# Patient Record
Sex: FEMALE | Race: WHITE | NOT HISPANIC OR LATINO | Employment: OTHER | URBAN - METROPOLITAN AREA
[De-identification: names, ages, dates, MRNs, and addresses within clinical notes are randomized per-mention and may not be internally consistent; named-entity substitution may affect disease eponyms.]

---

## 2017-07-02 ENCOUNTER — HOSPITAL ENCOUNTER (EMERGENCY)
Facility: HOSPITAL | Age: 57
Discharge: OTHER INSTITUTION | End: 2017-07-03
Attending: EMERGENCY MEDICINE | Admitting: EMERGENCY MEDICINE

## 2017-07-02 DIAGNOSIS — K04.7 DENTAL ABSCESS: Primary | ICD-10-CM

## 2017-07-02 PROCEDURE — 80048 BASIC METABOLIC PNL TOTAL CA: CPT | Performed by: EMERGENCY MEDICINE

## 2017-07-02 PROCEDURE — 99282 EMERGENCY DEPT VISIT SF MDM: CPT | Performed by: EMERGENCY MEDICINE

## 2017-07-02 PROCEDURE — 96361 HYDRATE IV INFUSION ADD-ON: CPT

## 2017-07-02 PROCEDURE — 99284 EMERGENCY DEPT VISIT MOD MDM: CPT

## 2017-07-02 PROCEDURE — 85025 COMPLETE CBC W/AUTO DIFF WBC: CPT | Performed by: EMERGENCY MEDICINE

## 2017-07-02 PROCEDURE — 96365 THER/PROPH/DIAG IV INF INIT: CPT

## 2017-07-03 ENCOUNTER — APPOINTMENT (OUTPATIENT)
Dept: CT IMAGING | Facility: HOSPITAL | Age: 57
End: 2017-07-03
Attending: EMERGENCY MEDICINE

## 2017-07-03 VITALS
OXYGEN SATURATION: 92 % | WEIGHT: 184 LBS | TEMPERATURE: 98.4 F | HEIGHT: 68 IN | BODY MASS INDEX: 27.89 KG/M2 | DIASTOLIC BLOOD PRESSURE: 72 MMHG | SYSTOLIC BLOOD PRESSURE: 111 MMHG | RESPIRATION RATE: 14 BRPM | HEART RATE: 84 BPM

## 2017-07-03 LAB
ANION GAP SERPL CALCULATED.3IONS-SCNC: 15.6 MMOL/L
BASOPHILS # BLD AUTO: 0.05 10*3/MM3 (ref 0–0.2)
BASOPHILS NFR BLD AUTO: 0.4 % (ref 0–2)
BUN BLD-MCNC: 18 MG/DL (ref 6–20)
BUN/CREAT SERPL: 17.5 (ref 7–25)
CALCIUM SPEC-SCNC: 9.5 MG/DL (ref 8.6–10.5)
CHLORIDE SERPL-SCNC: 100 MMOL/L (ref 98–107)
CO2 SERPL-SCNC: 22.4 MMOL/L (ref 22–29)
CREAT BLD-MCNC: 1.03 MG/DL (ref 0.57–1)
DEPRECATED RDW RBC AUTO: 47 FL (ref 37–54)
EOSINOPHIL # BLD AUTO: 1.08 10*3/MM3 (ref 0.1–0.3)
EOSINOPHIL NFR BLD AUTO: 8.1 % (ref 0–4)
ERYTHROCYTE [DISTWIDTH] IN BLOOD BY AUTOMATED COUNT: 13.7 % (ref 11.5–14.5)
GFR SERPL CREATININE-BSD FRML MDRD: 55 ML/MIN/1.73
GLUCOSE BLD-MCNC: 94 MG/DL (ref 65–99)
HCT VFR BLD AUTO: 41.5 % (ref 37–47)
HGB BLD-MCNC: 13.9 G/DL (ref 12–16)
IMM GRANULOCYTES # BLD: 0.03 10*3/MM3 (ref 0–0.03)
IMM GRANULOCYTES NFR BLD: 0.2 % (ref 0–0.5)
LYMPHOCYTES # BLD AUTO: 1.9 10*3/MM3 (ref 0.6–4.8)
LYMPHOCYTES NFR BLD AUTO: 14.2 % (ref 20–45)
MCH RBC QN AUTO: 31 PG (ref 27–31)
MCHC RBC AUTO-ENTMCNC: 33.5 G/DL (ref 31–37)
MCV RBC AUTO: 92.4 FL (ref 81–99)
MONOCYTES # BLD AUTO: 1.36 10*3/MM3 (ref 0–1)
MONOCYTES NFR BLD AUTO: 10.2 % (ref 3–8)
NEUTROPHILS # BLD AUTO: 8.92 10*3/MM3 (ref 1.5–8.3)
NEUTROPHILS NFR BLD AUTO: 66.9 % (ref 45–70)
NRBC BLD MANUAL-RTO: 0 /100 WBC (ref 0–0)
PLATELET # BLD AUTO: 296 10*3/MM3 (ref 140–500)
PMV BLD AUTO: 10.3 FL (ref 7.4–10.4)
POTASSIUM BLD-SCNC: 3.8 MMOL/L (ref 3.5–5.2)
RBC # BLD AUTO: 4.49 10*6/MM3 (ref 4.2–5.4)
SODIUM BLD-SCNC: 138 MMOL/L (ref 136–145)
WBC NRBC COR # BLD: 13.34 10*3/MM3 (ref 4.8–10.8)

## 2017-07-03 PROCEDURE — 70491 CT SOFT TISSUE NECK W/DYE: CPT

## 2017-07-03 PROCEDURE — 70487 CT MAXILLOFACIAL W/DYE: CPT

## 2017-07-03 PROCEDURE — 0 IOPAMIDOL PER 1 ML: Performed by: EMERGENCY MEDICINE

## 2017-07-03 RX ORDER — HYDROCODONE BITARTRATE AND ACETAMINOPHEN 5; 325 MG/1; MG/1
1 TABLET ORAL ONCE
Status: COMPLETED | OUTPATIENT
Start: 2017-07-03 | End: 2017-07-03

## 2017-07-03 RX ORDER — HYDROCODONE BITARTRATE AND ACETAMINOPHEN 5; 325 MG/1; MG/1
TABLET ORAL
Status: COMPLETED
Start: 2017-07-03 | End: 2017-07-03

## 2017-07-03 RX ORDER — CLINDAMYCIN PHOSPHATE 600 MG/50ML
600 INJECTION INTRAVENOUS ONCE
Status: COMPLETED | OUTPATIENT
Start: 2017-07-03 | End: 2017-07-03

## 2017-07-03 RX ADMIN — HYDROCODONE BITARTRATE AND ACETAMINOPHEN 1 TABLET: 5; 325 TABLET ORAL at 02:14

## 2017-07-03 RX ADMIN — CLINDAMYCIN PHOSPHATE 600 MG: 12 INJECTION, SOLUTION INTRAVENOUS at 02:55

## 2017-07-03 RX ADMIN — IOPAMIDOL 100 ML: 755 INJECTION, SOLUTION INTRAVENOUS at 02:48

## 2017-07-03 RX ADMIN — SODIUM CHLORIDE 1000 ML: 9 INJECTION, SOLUTION INTRAVENOUS at 00:59

## 2017-07-03 RX ADMIN — IOPAMIDOL 100 ML: 755 INJECTION, SOLUTION INTRAVENOUS at 02:45

## 2017-07-03 NOTE — ED PROVIDER NOTES
Subjective   History of Present Illness  History of Present Illness    Chief complaint: Swelling on the roof of mouth    Location: Upper left hard palate    Quality/Severity:  Moderate, sharp pain    Timing/Onset/Duration: Gradual onset since Thursday    Modifying Factors: It hurts to touch the area, feels better not to touch it    Associated Symptoms: No headache.  The patient has had subjective fever and chills.  No cough.  She has had throat pain and difficulty swallowing for last 3 days.  The patient has had clear nasal congestion.    Narrative: This 56-year-old white female presents with a swelling on the roof of her mouth.  She has redness on the left side of her cheek.  She complains of difficulty swallowing and throat pain for the last 3 days.  The patient recently traveled to [facility].  She denies any trauma.  The patient is not diabetic.  The patient denies any headache.  She has had subjective fever and chills.  No cough or earache.  The patient has had clear nasal congestion.  No chest pain or shortness of breath.  No abdominal pain.  No diarrhea or burning when she urinates.  No nausea or vomiting.    PCP:Siria      Review of Systems   Constitutional: Positive for chills and fever (subjective).   HENT: Positive for congestion, rhinorrhea and trouble swallowing. Negative for ear pain and sore throat.    Eyes: Negative for pain and discharge.   Respiratory: Negative for cough, chest tightness, shortness of breath and wheezing.    Cardiovascular: Negative for chest pain, palpitations and leg swelling.   Gastrointestinal: Positive for blood in stool. Negative for abdominal pain, diarrhea, nausea and vomiting.   Genitourinary: Negative for difficulty urinating.   Musculoskeletal: Negative for back pain.   Skin: Positive for rash (redness left cheek). Negative for pallor.   Neurological: Negative for weakness and headaches.   Hematological: Negative for adenopathy.   Psychiatric/Behavioral: Negative  for confusion.        Medication List      ASK your doctor about these medications          cefuroxime 500 MG tablet   Commonly known as:  CEFTIN       HYDROcodone-acetaminophen 7.5-325 MG per tablet   Commonly known as:  NORCO       loratadine 10 MG tablet   Commonly known as:  CLARITIN       phenazopyridine 200 MG tablet   Commonly known as:  PYRIDIUM       potassium citrate 10 MEQ (1080 MG) CR tablet   Commonly known as:  UROCIT-K           Past Medical History:   Diagnosis Date   • Arthritis    • GERD (gastroesophageal reflux disease)    • Hiatal hernia    • Kidney calculi    • Kidney stone        Allergies   Allergen Reactions   • Nickel Swelling       Past Surgical History:   Procedure Laterality Date   • BREAST SURGERY     •  SECTION     • CHOLECYSTECTOMY     • CYSTOSCOPY N/A 2016    Procedure: CYSTOSCOPY;  Surgeon: Mikey Aguirre MD;  Location: [facility] OR;  Service:    • HYSTERECTOMY     • KIDNEY STONE SURGERY     • URETEROSCOPY LASER LITHOTRIPSY WITH STENT INSERTION Left 2016    Procedure: lt URETEROSCOPY LASER LITHOTRIPSY WITH stone basket extraction w/STENT INSERTION;  Surgeon: Hasmukh Krueger MD;  Location: [facility] OR;  Service:        Family History   Problem Relation Age of Onset   • Heart disease Mother    • Cancer Father        Social History     Social History   • Marital status:      Spouse name: N/A   • Number of children: N/A   • Years of education: N/A     Social History Main Topics   • Smoking status: Former Smoker     Years: 43.00     Types: Cigarettes     Quit date: 2016   • Smokeless tobacco: Never Used   • Alcohol use No   • Drug use: No   • Sexual activity: Defer     Other Topics Concern   • None     Social History Narrative           Objective   Physical Exam   Constitutional: She is oriented to person, place, and time. She appears well-developed and well-nourished. No distress.   ED Triage Vitals:  Temp: 98.2 °F (36.8 °C) (17 2154)  Heart  Rate: 92 (07/02/17 2154)  Resp: 18 (07/02/17 2154)  BP: 156/81 (07/02/17 2154)  SpO2: 100 % (07/02/17 2154)  Temp src: Oral (07/02/17 2154)  Heart Rate Source: Monitor (07/02/17 2154)  Patient Position: Sitting (07/02/17 2154)  BP Location: Right arm (07/02/17 2154)  FiO2 (%): n/a    The patient's vitals were reviewed by me.  Unless otherwise noted they are within normal limits.  The patient is hypertensive with blood pressure 156/81.     HENT:   Head: Normocephalic and atraumatic.   Right Ear: External ear normal.   Left Ear: External ear normal.   Nose: Nose normal.   Mouth/Throat: Oropharynx is clear and moist.   There is a tender fluctuance noted on the roof of the mouth in the region of the left hard palate.  There is redness noted to the left side of the cheek.  The patient has no trismus.  There is no uvular edema or deviation.  The patient is managing her own airway.  The patient has no malocclusion of the teeth.   Eyes: Conjunctivae and EOM are normal. Pupils are equal, round, and reactive to light. Right eye exhibits no discharge. Left eye exhibits no discharge.   Neck: Normal range of motion. Neck supple. No JVD present. No tracheal deviation present. No thyromegaly present.   Cardiovascular: Normal rate, regular rhythm, normal heart sounds and intact distal pulses.  Exam reveals no gallop and no friction rub.    No murmur heard.  Pulmonary/Chest: Effort normal and breath sounds normal. No stridor. No respiratory distress. She has no wheezes. She has no rales. She exhibits no tenderness.   Abdominal: Soft. Bowel sounds are normal. She exhibits no distension and no mass. There is no tenderness. There is no rebound and no guarding. No hernia.   Musculoskeletal: Normal range of motion. She exhibits no edema or deformity.   Lymphadenopathy:     She has no cervical adenopathy.   Neurological: She is alert and oriented to person, place, and time. No cranial nerve deficit. She exhibits normal muscle tone.  Coordination normal.   Skin: Skin is warm and dry. No rash noted. She is not diaphoretic. There is erythema (left cheek). No pallor.   Psychiatric: Her behavior is normal.   Nursing note and vitals reviewed.      Procedures         ED Course  ED Course   Comment By Time   Values were reviewed by me.  The creatinines 1.03.  The second 13.3.  The laboratory values are otherwise unremarkable. Reynaldo Leon MD 07/03 0152    12:49 AM, 07/03/17:  The risks versus the benefits of receiving IV contrast with the patient's CT were discussed with her.  She has elected to receive IV contrast with the hydration protocol.              MDM  CT Facial Bones With Contrast    (Results Pending)     Labs Reviewed   BASIC METABOLIC PANEL   CBC WITH AUTO DIFFERENTIAL   CBC AND DIFFERENTIAL    Narrative:     The following orders were created for panel order CBC & Differential.  Procedure                               Abnormality         Status                     ---------                               -----------         ------                     CBC Auto Differential[[id number]]                                                          Please view results for these tests on the individual orders.     2:22 AM, 07/03/17:  The CT of the face and neck shows a large periapical lucency associated with the left maxillary first and second incisor and left canine likely representing a periapical abscess, soft tissue abscess in the roof of the mouth measuring 1.3 back 0.9 x 1.5 cm, poor dentition, mucosal thickening left maxillary sinus    2:52 AM, 07/03/17:  I spoke with Dr. Ventura, on call for oral surgery, he wants the patient to go to the emergency department and consult the dental service.    Final diagnoses:   None         ED Medications:  Medications - No data to display    New Medications:     Medication List      ASK your doctor about these medications          cefuroxime 500 MG tablet   Commonly known as:  CEFTIN        HYDROcodone-acetaminophen 7.5-325 MG per tablet   Commonly known as:  NORCO       loratadine 10 MG tablet   Commonly known as:  CLARITIN       phenazopyridine 200 MG tablet   Commonly known as:  PYRIDIUM       potassium citrate 10 MEQ (1080 MG) CR tablet   Commonly known as:  UROCIT-K           Stopped Medications:     Medication List      ASK your doctor about these medications          cefuroxime 500 MG tablet   Commonly known as:  CEFTIN       HYDROcodone-acetaminophen 7.5-325 MG per tablet   Commonly known as:  NORCO       loratadine 10 MG tablet   Commonly known as:  CLARITIN       phenazopyridine 200 MG tablet   Commonly known as:  PYRIDIUM       potassium citrate 10 MEQ (1080 MG) CR tablet   Commonly known as:  UROCIT-K             Final diagnoses:   Dental abscess            Reynaldo Leon MD  07/03/17 5991

## 2018-02-08 ENCOUNTER — HOSPITAL ENCOUNTER (OUTPATIENT)
Dept: GENERAL RADIOLOGY | Facility: HOSPITAL | Age: 58
Discharge: HOME | End: 2018-02-08
Attending: UROLOGY | Admitting: UROLOGY

## 2018-02-08 ENCOUNTER — TRANSCRIBE ORDERS (OUTPATIENT)
Dept: ADMINISTRATIVE | Facility: HOSPITAL | Age: 58
End: 2018-02-08

## 2018-02-08 DIAGNOSIS — N20.0 RENAL CALCULUS: Primary | ICD-10-CM

## 2018-02-08 DIAGNOSIS — N20.0 RENAL CALCULUS: ICD-10-CM

## 2018-02-08 PROCEDURE — 74018 RADEX ABDOMEN 1 VIEW: CPT

## 2019-02-28 RX ORDER — OXYCODONE AND ACETAMINOPHEN 7.5; 325 MG/1; MG/1
1 TABLET ORAL EVERY 6 HOURS PRN
COMMUNITY
End: 2019-03-12

## 2019-02-28 RX ORDER — CIPROFLOXACIN 500 MG/1
500 TABLET, FILM COATED ORAL 2 TIMES DAILY
COMMUNITY
End: 2019-03-12

## 2019-02-28 RX ORDER — ONDANSETRON 4 MG/1
4 TABLET, FILM COATED ORAL EVERY 8 HOURS PRN
COMMUNITY
End: 2019-08-08

## 2019-02-28 RX ORDER — OMEPRAZOLE 20 MG/1
20 CAPSULE, DELAYED RELEASE ORAL DAILY PRN
COMMUNITY

## 2019-02-28 RX ORDER — ATORVASTATIN CALCIUM 10 MG/1
10 TABLET, FILM COATED ORAL NIGHTLY
COMMUNITY

## 2019-03-01 ENCOUNTER — APPOINTMENT (OUTPATIENT)
Dept: GENERAL RADIOLOGY | Facility: HOSPITAL | Age: 59
End: 2019-03-01

## 2019-03-01 ENCOUNTER — HOSPITAL ENCOUNTER (OUTPATIENT)
Facility: HOSPITAL | Age: 59
Setting detail: HOSPITAL OUTPATIENT SURGERY
Discharge: HOME | End: 2019-03-01
Attending: UROLOGY | Admitting: UROLOGY

## 2019-03-01 ENCOUNTER — ANESTHESIA (OUTPATIENT)
Dept: PERIOP | Facility: HOSPITAL | Age: 59
End: 2019-03-01

## 2019-03-01 ENCOUNTER — ANESTHESIA EVENT (OUTPATIENT)
Dept: PERIOP | Facility: HOSPITAL | Age: 59
End: 2019-03-01

## 2019-03-01 VITALS
OXYGEN SATURATION: 96 % | WEIGHT: 191.8 LBS | SYSTOLIC BLOOD PRESSURE: 112 MMHG | RESPIRATION RATE: 18 BRPM | HEART RATE: 66 BPM | HEIGHT: 68 IN | DIASTOLIC BLOOD PRESSURE: 66 MMHG | BODY MASS INDEX: 29.07 KG/M2 | TEMPERATURE: 97.8 F

## 2019-03-01 DIAGNOSIS — N20.1 LEFT URETERAL STONE: Primary | ICD-10-CM

## 2019-03-01 DIAGNOSIS — N20.0 RENAL STONE: ICD-10-CM

## 2019-03-01 PROCEDURE — 25010000002 MIDAZOLAM PER 1 MG: Performed by: ANESTHESIOLOGY

## 2019-03-01 PROCEDURE — 93005 ELECTROCARDIOGRAM TRACING: CPT | Performed by: UROLOGY

## 2019-03-01 PROCEDURE — 25010000002 FENTANYL CITRATE (PF) 100 MCG/2ML SOLUTION: Performed by: NURSE ANESTHETIST, CERTIFIED REGISTERED

## 2019-03-01 PROCEDURE — 93010 ELECTROCARDIOGRAM REPORT: CPT | Performed by: INTERNAL MEDICINE

## 2019-03-01 PROCEDURE — C2617 STENT, NON-COR, TEM W/O DEL: HCPCS | Performed by: UROLOGY

## 2019-03-01 PROCEDURE — 74420 UROGRAPHY RTRGR +-KUB: CPT

## 2019-03-01 PROCEDURE — 0 IOTHALAMATE 60 % SOLUTION: Performed by: UROLOGY

## 2019-03-01 PROCEDURE — 82360 CALCULUS ASSAY QUANT: CPT | Performed by: UROLOGY

## 2019-03-01 PROCEDURE — 25010000002 PROPOFOL 10 MG/ML EMULSION: Performed by: NURSE ANESTHETIST, CERTIFIED REGISTERED

## 2019-03-01 PROCEDURE — 25010000002 ONDANSETRON PER 1 MG: Performed by: NURSE ANESTHETIST, CERTIFIED REGISTERED

## 2019-03-01 PROCEDURE — 25010000002 CEFTRIAXONE PER 250 MG: Performed by: UROLOGY

## 2019-03-01 DEVICE — URETERAL STENT
Type: IMPLANTABLE DEVICE | Site: URETER | Status: FUNCTIONAL
Brand: CONTOUR™

## 2019-03-01 RX ORDER — OXYCODONE AND ACETAMINOPHEN 7.5; 325 MG/1; MG/1
1 TABLET ORAL ONCE AS NEEDED
Status: DISCONTINUED | OUTPATIENT
Start: 2019-03-01 | End: 2019-03-01 | Stop reason: HOSPADM

## 2019-03-01 RX ORDER — CEFTRIAXONE SODIUM 1 G/50ML
1 INJECTION, SOLUTION INTRAVENOUS ONCE
Status: COMPLETED | OUTPATIENT
Start: 2019-03-01 | End: 2019-03-01

## 2019-03-01 RX ORDER — DIPHENHYDRAMINE HCL 25 MG
25 CAPSULE ORAL
Status: DISCONTINUED | OUTPATIENT
Start: 2019-03-01 | End: 2019-03-01 | Stop reason: HOSPADM

## 2019-03-01 RX ORDER — HYDROMORPHONE HYDROCHLORIDE 1 MG/ML
0.5 INJECTION, SOLUTION INTRAMUSCULAR; INTRAVENOUS; SUBCUTANEOUS
Status: DISCONTINUED | OUTPATIENT
Start: 2019-03-01 | End: 2019-03-01 | Stop reason: HOSPADM

## 2019-03-01 RX ORDER — DIPHENHYDRAMINE HYDROCHLORIDE 50 MG/ML
12.5 INJECTION INTRAMUSCULAR; INTRAVENOUS
Status: DISCONTINUED | OUTPATIENT
Start: 2019-03-01 | End: 2019-03-01 | Stop reason: HOSPADM

## 2019-03-01 RX ORDER — ONDANSETRON 4 MG/1
4 TABLET, FILM COATED ORAL ONCE AS NEEDED
Status: DISCONTINUED | OUTPATIENT
Start: 2019-03-01 | End: 2019-03-01 | Stop reason: HOSPADM

## 2019-03-01 RX ORDER — HYDRALAZINE HYDROCHLORIDE 20 MG/ML
5 INJECTION INTRAMUSCULAR; INTRAVENOUS
Status: DISCONTINUED | OUTPATIENT
Start: 2019-03-01 | End: 2019-03-01 | Stop reason: HOSPADM

## 2019-03-01 RX ORDER — PROMETHAZINE HYDROCHLORIDE 25 MG/ML
12.5 INJECTION, SOLUTION INTRAMUSCULAR; INTRAVENOUS ONCE AS NEEDED
Status: DISCONTINUED | OUTPATIENT
Start: 2019-03-01 | End: 2019-03-01 | Stop reason: HOSPADM

## 2019-03-01 RX ORDER — ACETAMINOPHEN 325 MG/1
650 TABLET ORAL ONCE AS NEEDED
Status: DISCONTINUED | OUTPATIENT
Start: 2019-03-01 | End: 2019-03-01 | Stop reason: HOSPADM

## 2019-03-01 RX ORDER — NALOXONE HCL 0.4 MG/ML
0.2 VIAL (ML) INJECTION AS NEEDED
Status: DISCONTINUED | OUTPATIENT
Start: 2019-03-01 | End: 2019-03-01 | Stop reason: HOSPADM

## 2019-03-01 RX ORDER — SODIUM CHLORIDE, SODIUM LACTATE, POTASSIUM CHLORIDE, CALCIUM CHLORIDE 600; 310; 30; 20 MG/100ML; MG/100ML; MG/100ML; MG/100ML
9 INJECTION, SOLUTION INTRAVENOUS CONTINUOUS
Status: DISCONTINUED | OUTPATIENT
Start: 2019-03-01 | End: 2019-03-01 | Stop reason: HOSPADM

## 2019-03-01 RX ORDER — ONDANSETRON 2 MG/ML
4 INJECTION INTRAMUSCULAR; INTRAVENOUS ONCE AS NEEDED
Status: DISCONTINUED | OUTPATIENT
Start: 2019-03-01 | End: 2019-03-01 | Stop reason: HOSPADM

## 2019-03-01 RX ORDER — FENTANYL CITRATE 50 UG/ML
INJECTION, SOLUTION INTRAMUSCULAR; INTRAVENOUS AS NEEDED
Status: DISCONTINUED | OUTPATIENT
Start: 2019-03-01 | End: 2019-03-01 | Stop reason: SURG

## 2019-03-01 RX ORDER — MIDAZOLAM HYDROCHLORIDE 1 MG/ML
2 INJECTION INTRAMUSCULAR; INTRAVENOUS
Status: DISCONTINUED | OUTPATIENT
Start: 2019-03-01 | End: 2019-03-01 | Stop reason: HOSPADM

## 2019-03-01 RX ORDER — MIDAZOLAM HYDROCHLORIDE 1 MG/ML
1 INJECTION INTRAMUSCULAR; INTRAVENOUS
Status: DISCONTINUED | OUTPATIENT
Start: 2019-03-01 | End: 2019-03-01 | Stop reason: HOSPADM

## 2019-03-01 RX ORDER — PROPOFOL 10 MG/ML
VIAL (ML) INTRAVENOUS AS NEEDED
Status: DISCONTINUED | OUTPATIENT
Start: 2019-03-01 | End: 2019-03-01 | Stop reason: SURG

## 2019-03-01 RX ORDER — MAGNESIUM HYDROXIDE 1200 MG/15ML
LIQUID ORAL AS NEEDED
Status: DISCONTINUED | OUTPATIENT
Start: 2019-03-01 | End: 2019-03-01 | Stop reason: HOSPADM

## 2019-03-01 RX ORDER — PROMETHAZINE HYDROCHLORIDE 25 MG/1
25 SUPPOSITORY RECTAL ONCE AS NEEDED
Status: DISCONTINUED | OUTPATIENT
Start: 2019-03-01 | End: 2019-03-01 | Stop reason: HOSPADM

## 2019-03-01 RX ORDER — LIDOCAINE HYDROCHLORIDE 10 MG/ML
0.5 INJECTION, SOLUTION EPIDURAL; INFILTRATION; INTRACAUDAL; PERINEURAL ONCE AS NEEDED
Status: DISCONTINUED | OUTPATIENT
Start: 2019-03-01 | End: 2019-03-01 | Stop reason: HOSPADM

## 2019-03-01 RX ORDER — PHENAZOPYRIDINE HYDROCHLORIDE 200 MG/1
200 TABLET, FILM COATED ORAL
Status: DISCONTINUED | OUTPATIENT
Start: 2019-03-01 | End: 2019-03-01 | Stop reason: HOSPADM

## 2019-03-01 RX ORDER — PROMETHAZINE HYDROCHLORIDE 25 MG/1
25 TABLET ORAL ONCE AS NEEDED
Status: DISCONTINUED | OUTPATIENT
Start: 2019-03-01 | End: 2019-03-01 | Stop reason: HOSPADM

## 2019-03-01 RX ORDER — EPHEDRINE SULFATE 50 MG/ML
5 INJECTION, SOLUTION INTRAVENOUS ONCE AS NEEDED
Status: DISCONTINUED | OUTPATIENT
Start: 2019-03-01 | End: 2019-03-01 | Stop reason: HOSPADM

## 2019-03-01 RX ORDER — PHENAZOPYRIDINE HYDROCHLORIDE 100 MG/1
100 TABLET, FILM COATED ORAL ONCE
Qty: 1 TABLET | Refills: 0 | Status: SHIPPED | OUTPATIENT
Start: 2019-03-01 | End: 2019-03-01

## 2019-03-01 RX ORDER — HYDROCODONE BITARTRATE AND ACETAMINOPHEN 7.5; 325 MG/1; MG/1
1 TABLET ORAL ONCE AS NEEDED
Status: COMPLETED | OUTPATIENT
Start: 2019-03-01 | End: 2019-03-01

## 2019-03-01 RX ORDER — IBUPROFEN 600 MG/1
600 TABLET ORAL EVERY 6 HOURS PRN
Status: DISCONTINUED | OUTPATIENT
Start: 2019-03-01 | End: 2019-03-01 | Stop reason: HOSPADM

## 2019-03-01 RX ORDER — LIDOCAINE HYDROCHLORIDE 20 MG/ML
INJECTION, SOLUTION INFILTRATION; PERINEURAL AS NEEDED
Status: DISCONTINUED | OUTPATIENT
Start: 2019-03-01 | End: 2019-03-01 | Stop reason: SURG

## 2019-03-01 RX ORDER — LABETALOL HYDROCHLORIDE 5 MG/ML
5 INJECTION, SOLUTION INTRAVENOUS
Status: DISCONTINUED | OUTPATIENT
Start: 2019-03-01 | End: 2019-03-01 | Stop reason: HOSPADM

## 2019-03-01 RX ORDER — FENTANYL CITRATE 50 UG/ML
50 INJECTION, SOLUTION INTRAMUSCULAR; INTRAVENOUS
Status: DISCONTINUED | OUTPATIENT
Start: 2019-03-01 | End: 2019-03-01 | Stop reason: HOSPADM

## 2019-03-01 RX ORDER — FAMOTIDINE 10 MG/ML
20 INJECTION, SOLUTION INTRAVENOUS ONCE
Status: COMPLETED | OUTPATIENT
Start: 2019-03-01 | End: 2019-03-01

## 2019-03-01 RX ORDER — FLUMAZENIL 0.1 MG/ML
0.2 INJECTION INTRAVENOUS AS NEEDED
Status: DISCONTINUED | OUTPATIENT
Start: 2019-03-01 | End: 2019-03-01 | Stop reason: HOSPADM

## 2019-03-01 RX ORDER — SODIUM CHLORIDE 0.9 % (FLUSH) 0.9 %
1-10 SYRINGE (ML) INJECTION AS NEEDED
Status: DISCONTINUED | OUTPATIENT
Start: 2019-03-01 | End: 2019-03-01 | Stop reason: HOSPADM

## 2019-03-01 RX ORDER — SODIUM CHLORIDE 9 MG/ML
9 INJECTION, SOLUTION INTRAVENOUS CONTINUOUS
Status: DISCONTINUED | OUTPATIENT
Start: 2019-03-01 | End: 2019-03-01 | Stop reason: HOSPADM

## 2019-03-01 RX ORDER — ONDANSETRON 2 MG/ML
INJECTION INTRAMUSCULAR; INTRAVENOUS AS NEEDED
Status: DISCONTINUED | OUTPATIENT
Start: 2019-03-01 | End: 2019-03-01 | Stop reason: SURG

## 2019-03-01 RX ADMIN — CEFTRIAXONE SODIUM 1 G: 1 INJECTION, SOLUTION INTRAVENOUS at 12:49

## 2019-03-01 RX ADMIN — PROPOFOL 200 MG: 10 INJECTION, EMULSION INTRAVENOUS at 12:54

## 2019-03-01 RX ADMIN — LIDOCAINE HYDROCHLORIDE 60 MG: 20 INJECTION, SOLUTION INFILTRATION; PERINEURAL at 12:54

## 2019-03-01 RX ADMIN — FENTANYL CITRATE 50 MCG: 50 INJECTION INTRAMUSCULAR; INTRAVENOUS at 13:01

## 2019-03-01 RX ADMIN — SODIUM CHLORIDE, POTASSIUM CHLORIDE, SODIUM LACTATE AND CALCIUM CHLORIDE: 600; 310; 30; 20 INJECTION, SOLUTION INTRAVENOUS at 12:48

## 2019-03-01 RX ADMIN — MIDAZOLAM 2 MG: 1 INJECTION INTRAMUSCULAR; INTRAVENOUS at 12:39

## 2019-03-01 RX ADMIN — HYDROCODONE BITARTRATE AND ACETAMINOPHEN 1 TABLET: 7.5; 325 TABLET ORAL at 14:25

## 2019-03-01 RX ADMIN — FENTANYL CITRATE 50 MCG: 50 INJECTION INTRAMUSCULAR; INTRAVENOUS at 12:57

## 2019-03-01 RX ADMIN — FAMOTIDINE 20 MG: 10 INJECTION INTRAVENOUS at 12:39

## 2019-03-01 RX ADMIN — PHENAZOPYRIDINE 200 MG: 200 TABLET ORAL at 14:46

## 2019-03-01 RX ADMIN — ONDANSETRON 4 MG: 2 INJECTION INTRAMUSCULAR; INTRAVENOUS at 13:24

## 2019-03-01 NOTE — ANESTHESIA PROCEDURE NOTES
Airway  Urgency: elective    Airway not difficult    General Information and Staff    Patient location during procedure: OR  CRNA: Lesa Ahn CRNA    Indications and Patient Condition  Indications for airway management: airway protection    Preoxygenated: yes  MILS maintained throughout  Mask difficulty assessment: 1 - vent by mask    Final Airway Details  Final airway type: supraglottic airway      Successful airway: classic  Size 4    Number of attempts at approach: 1    Additional Comments  Pt preoxygenated, sivi, LMA placed with adequate seal and TV

## 2019-03-01 NOTE — BRIEF OP NOTE
URETEROSCOPY LASER LITHOTRIPSY WITH STENT INSERTION  Progress Note    Jennifer Arndt  3/1/2019    Pre-op Diagnosis:   Left ureteral stones        Post-Op Diagnosis Codes:   same    Procedure/CPT® Codes:      Procedure(s):  LEFT URETEROSCOPY LASER LITHOTRIPSY STONE BASKET EXTRACTION STENT    Surgeon(s):  Hasmukh Krueger MD    Anesthesia: General    Staff:   Circulator: Vanessa Schulte RN  Laser Staff: Isael Austin  Scrub Person: Zamzam Velazquez    Estimated Blood Loss: none    Urine Voided: * No values recorded between 3/1/2019 12:48 PM and 3/1/2019  1:34 PM *    Specimens:                ID Type Source Tests Collected by Time   1 : left kidney stones Tissue Kidney, Left STONE ANALYSIS Hasmukh Krueger MD 3/1/2019 1324         Drains:      Findings:2 stones fragmented extracted    Complications:     Hasmukh Krueger MD     Date: 3/1/2019  Time: 1:34 PM

## 2019-03-01 NOTE — OP NOTE
Preoperative diagnosis left ureteral stones hydronephrosis and colic bilateral nephric calculi    Postoperative same stones fragmented and extracted stent placed    Operative procedure cystoscopy left ureteroscopy holmium laser fragmentation stone basket extraction of stone fragments sent for chemical analysis and placement of 6 Nigerien by 26 cm double-J stent without tether under fluoroscopic guidance    Surgeon Evans [facility] general    Procedure note 58-year-old white female plagued with recurrent stone disease presented herself with 2 ureteral stones proximal and distal today's exam however revealed both stones in the distal ureter she is undergo the above-mentioned procedure site was marked antibiotics were given timeout was taken after adequate general anesthesia was placed very carefully modified lithotomy position all pressure points relieved prepped draped sterile fashion genitalia 2% intraurethral Xylocaine jelly administered scope was advanced to the bladder trigone was normal orifices in normal position configuration sensor guidewire was placed up fluoroscopically passed the stones up in the upper collecting system dilation was then a 10 Nigerien ureteroscope was advanced to the stone holmium laser fragmentation of stone with basket extraction of stone fragments reinspection of the ureter up to the ureteropelvic junction revealed a very small stone fragments from the from the procedure but no large fragments or stones were seen.  Scope was then removed cystoscope was placed back over the guidewire a 6 Nigerien by 26 cm stent was placed with good curl in the upper collecting system and bladder no tethered wires are partially filled remainder Xylocaine jelly was instilled per urethra and she was sent to the recovery room in satisfactory condition discussed with findings with the patient spent  over the phone    Disposition once stable discharge today with an outpatient instruction sheet follow  back in my office in 1 week for removal of the stent at that time Pyridium Phenergan and Macrobid suppression she is almost completed with her Cipro that she been placed on through the emergency room I did write her for Norco which she is tolerating the past we will review this with her before she is discharged if any question will be removed.  I will discuss later regarding lithotripsy for the remaining stones because of her constant problems with passing the signs and problems both with a history of urosepsis as well as inability to pass any stones thank you

## 2019-03-01 NOTE — ANESTHESIA POSTPROCEDURE EVALUATION
"Patient: Jennifer Arndt    Procedure Summary     Date:  03/01/19 Room / Location:  [facility] OR 01 / [facility] MAIN OR    Anesthesia Start:  1248 Anesthesia Stop:  1343    Procedure:  LEFT URETEROSCOPY LASER LITHOTRIPSY STONE BASKET EXTRACTION STENT (Left ) Diagnosis:      Surgeon:  Hasmukh Krueger MD Provider:  Tylor Rene MD    Anesthesia Type:  general ASA Status:  2          Anesthesia Type: general  Last vitals  BP   110/72 (03/01/19 1446)   Temp   36.6 °C (97.8 °F) (03/01/19 1341)   Pulse   69 (03/01/19 1446)   Resp   16 (03/01/19 1446)     SpO2   96 % (03/01/19 1446)     Post Anesthesia Care and Evaluation    Patient location during evaluation: bedside  Patient participation: complete - patient participated  Level of consciousness: awake and alert  Pain management: adequate  Airway patency: patent  Anesthetic complications: No anesthetic complications    Cardiovascular status: acceptable  Respiratory status: acceptable  Hydration status: acceptable    Comments: /72   Pulse 69   Temp 36.6 °C (97.8 °F) (Oral)   Resp 16   Ht 172.7 cm (68\")   Wt 87 kg (191 lb 12.8 oz)   SpO2 96%   BMI 29.16 kg/m²       "

## 2019-03-01 NOTE — ANESTHESIA PREPROCEDURE EVALUATION
Anesthesia Evaluation     Patient summary reviewed   history of anesthetic complications: prolonged sedation  NPO Solid Status: > 8 hours  NPO Liquid Status: > 2 hours           Airway   Mallampati: III  TM distance: >3 FB  Neck ROM: full  Comment: Easy previous LMA #4 insertion  Dental      Pulmonary     breath sounds clear to auscultation  (+) a smoker Former, COPD mild,   (-) shortness of breath  Cardiovascular   Exercise tolerance: good (4-7 METS)    Rhythm: regular  Rate: normal    (+) hyperlipidemia,   (-) angina, [place]LLADO      Neuro/Psych  GI/Hepatic/Renal/Endo    (+)  GERD,      Musculoskeletal     Abdominal    Substance History      OB/GYN          Other   (+) arthritis                     Anesthesia Plan    ASA 2     general     intravenous induction   Anesthetic plan, all risks, benefits, and alternatives have been provided, discussed and informed consent has been obtained with: patient.

## 2019-03-01 NOTE — DISCHARGE INSTRUCTIONS
**Refer to Dr. Krueger's post-op discharge instruction sheet.**    **Last Norco pain pill given at 2:25 pm.**          Outpatient Surgery Guidelines, Adult  Outpatient procedures are those for which the person having the procedure is allowed to go home the same day as the procedure. Various procedures are done on an outpatient basis. You should follow some general guidelines if you will be having an outpatient procedure.  AFTER THE  PROCEDURE  After surgery, you will be taken to a recovery area, where your progress will be monitored. If there are no complications, you will be allowed to go home when you are awake, stable, and taking fluids well. You may have numbness around the surgical site. Healing will take some time. You will have tenderness at the surgical site and may have some swelling and bruising. You may also have some nausea.  HOME CARE INSTRUCTIONS  · Do not drive for 24 hours, or as directed by your health care provider. Do not drive while taking prescription pain medicines.  · Do not drink alcohol for 24 hours.  · Do not make important decisions or sign legal documents for 24 hours.  · Plan on having a responsible adult stay with your for 24 hours following your procedure.  · You may resume a normal diet and activities as directed.  · Do not lift anything heavier than 10 pounds (4.5 kg) or play contact sports until your health care provider says it is okay.  · Only take over-the-counter or prescription medicines as directed by your health care provider.  · Follow up with your health care provider as directed.  SEEK MEDICAL CARE IF:  · You have increased bleeding (more than a small spot) from the surgical site.  · You have redness, swelling, or increasing pain in the wound.  · You see pus coming from the wound.  · You have a fever > 101.  · You notice a bad smell coming from the wound or dressing.  · You feel lightheaded or faint.  · You develop a rash.  · You have trouble breathing.  · You develop  allergies.  MAKE SURE YOU:  · Understand these instructions.  · Will watch your condition.  · Will get help right away if you are not doing well or get worse.

## 2019-03-06 LAB
CA PHOS CRY STONE QL IR: 95 %
COLOR STONE: NORMAL
COM CRY STONE QL IR: 2 %
COMPN STONE: NORMAL
Lab: NORMAL
Lab: NORMAL
NIDUS STONE QL: NORMAL
PATH REPORT.COMMENTS IMP SPEC: NORMAL
SIZE STONE: NORMAL MM
TRI-PHOS MFR STONE: 3 %
WT STONE: 22.2 MG

## 2019-03-12 ENCOUNTER — APPOINTMENT (OUTPATIENT)
Dept: GENERAL RADIOLOGY | Facility: HOSPITAL | Age: 59
End: 2019-03-12

## 2019-03-12 ENCOUNTER — HOSPITAL ENCOUNTER (EMERGENCY)
Facility: HOSPITAL | Age: 59
Discharge: HOME | End: 2019-03-13
Attending: EMERGENCY MEDICINE | Admitting: EMERGENCY MEDICINE

## 2019-03-12 DIAGNOSIS — N20.1 LEFT URETERAL CALCULUS: Primary | ICD-10-CM

## 2019-03-12 LAB
BACTERIA UR QL AUTO: ABNORMAL /HPF
BILIRUB UR QL STRIP: NEGATIVE
CLARITY UR: CLEAR
COLOR UR: YELLOW
GLUCOSE UR STRIP-MCNC: NEGATIVE MG/DL
HGB UR QL STRIP.AUTO: ABNORMAL
HYALINE CASTS UR QL AUTO: ABNORMAL /LPF
KETONES UR QL STRIP: NEGATIVE
LEUKOCYTE ESTERASE UR QL STRIP.AUTO: NEGATIVE
NITRITE UR QL STRIP: NEGATIVE
PH UR STRIP.AUTO: 7 [PH] (ref 4.5–8)
PROT UR QL STRIP: NEGATIVE
RBC # UR: ABNORMAL /HPF
REF LAB TEST METHOD: ABNORMAL
SP GR UR STRIP: 1.01 (ref 1–1.03)
SQUAMOUS #/AREA URNS HPF: ABNORMAL /HPF
UROBILINOGEN UR QL STRIP: ABNORMAL
WBC UR QL AUTO: ABNORMAL /HPF

## 2019-03-12 PROCEDURE — 25010000002 HYDROMORPHONE PER 4 MG: Performed by: EMERGENCY MEDICINE

## 2019-03-12 PROCEDURE — 25010000002 ONDANSETRON PER 1 MG: Performed by: EMERGENCY MEDICINE

## 2019-03-12 PROCEDURE — 99284 EMERGENCY DEPT VISIT MOD MDM: CPT

## 2019-03-12 PROCEDURE — 74018 RADEX ABDOMEN 1 VIEW: CPT

## 2019-03-12 PROCEDURE — 25010000002 KETOROLAC TROMETHAMINE PER 15 MG: Performed by: EMERGENCY MEDICINE

## 2019-03-12 PROCEDURE — 99284 EMERGENCY DEPT VISIT MOD MDM: CPT | Performed by: EMERGENCY MEDICINE

## 2019-03-12 PROCEDURE — 81001 URINALYSIS AUTO W/SCOPE: CPT | Performed by: EMERGENCY MEDICINE

## 2019-03-12 PROCEDURE — 96375 TX/PRO/DX INJ NEW DRUG ADDON: CPT

## 2019-03-12 PROCEDURE — 96374 THER/PROPH/DIAG INJ IV PUSH: CPT

## 2019-03-12 RX ORDER — KETOROLAC TROMETHAMINE 30 MG/ML
15 INJECTION, SOLUTION INTRAMUSCULAR; INTRAVENOUS ONCE
Status: COMPLETED | OUTPATIENT
Start: 2019-03-12 | End: 2019-03-12

## 2019-03-12 RX ORDER — HYDROMORPHONE HCL 110MG/55ML
0.5 PATIENT CONTROLLED ANALGESIA SYRINGE INTRAVENOUS ONCE
Status: COMPLETED | OUTPATIENT
Start: 2019-03-12 | End: 2019-03-12

## 2019-03-12 RX ORDER — SODIUM CHLORIDE 0.9 % (FLUSH) 0.9 %
10 SYRINGE (ML) INJECTION AS NEEDED
Status: DISCONTINUED | OUTPATIENT
Start: 2019-03-12 | End: 2019-03-13 | Stop reason: HOSPADM

## 2019-03-12 RX ORDER — ONDANSETRON 2 MG/ML
4 INJECTION INTRAMUSCULAR; INTRAVENOUS ONCE
Status: COMPLETED | OUTPATIENT
Start: 2019-03-12 | End: 2019-03-12

## 2019-03-12 RX ADMIN — HYDROMORPHONE HYDROCHLORIDE 0.5 MG: 2 INJECTION, SOLUTION INTRAMUSCULAR; INTRAVENOUS; SUBCUTANEOUS at 22:56

## 2019-03-12 RX ADMIN — ONDANSETRON 4 MG: 2 INJECTION, SOLUTION INTRAMUSCULAR; INTRAVENOUS at 23:02

## 2019-03-12 RX ADMIN — KETOROLAC TROMETHAMINE 15 MG: 30 INJECTION INTRAMUSCULAR; INTRAVENOUS at 23:03

## 2019-03-13 VITALS
WEIGHT: 189 LBS | RESPIRATION RATE: 16 BRPM | BODY MASS INDEX: 28.64 KG/M2 | DIASTOLIC BLOOD PRESSURE: 65 MMHG | HEART RATE: 77 BPM | TEMPERATURE: 100 F | SYSTOLIC BLOOD PRESSURE: 105 MMHG | HEIGHT: 68 IN | OXYGEN SATURATION: 94 %

## 2019-03-13 RX ORDER — OXYCODONE AND ACETAMINOPHEN 7.5; 325 MG/1; MG/1
1 TABLET ORAL EVERY 4 HOURS PRN
Qty: 15 TABLET | Refills: 0 | Status: SHIPPED | OUTPATIENT
Start: 2019-03-13 | End: 2019-08-08

## 2019-03-13 RX ORDER — PROMETHAZINE HYDROCHLORIDE 25 MG/1
25 TABLET ORAL EVERY 6 HOURS PRN
Qty: 12 TABLET | Refills: 0 | Status: SHIPPED | OUTPATIENT
Start: 2019-03-13 | End: 2019-08-08

## 2019-03-13 NOTE — ED PROVIDER NOTES
Subjective   History of Present Illness  History of Present Illness    Chief complaint: Flank pain    Location: Lower left flank    Quality/Severity: Moderate    Timing/Duration: Patient had laser lithotripsy on March 1 with subsequent stent placement.  Patient has been having pain every since then.  Stent was removed on March 6 and pain became much worse last evening    Modifying Factors: None    Associated Symptoms: Nausea and vomiting    Narrative: The patient is a 58-year-old white female with a long history of kidney stones who presents as noted above.  The patient has had multiple urologic procedures due to her stones.  Last left ureteral stent removed on March 6 and some pain after that.  Pain much worse last evening associated with nausea and vomiting.    Review of Systems   Constitutional: Negative for activity change, appetite change, fatigue and fever.   HENT: Negative for congestion.    Respiratory: Negative for cough, shortness of breath and wheezing.    Cardiovascular: Negative for chest pain, palpitations and leg swelling.   Gastrointestinal: Positive for nausea and vomiting (Twice today). Negative for abdominal pain and diarrhea.   Endocrine: Negative for polydipsia.   Genitourinary: Positive for flank pain. Negative for difficulty urinating, dysuria, frequency, hematuria and urgency.   Musculoskeletal: Negative for back pain.   Skin: Negative for rash.   Neurological: Negative for dizziness, weakness and headaches.   Psychiatric/Behavioral: Negative for confusion.   All other systems reviewed and are negative.      Past Medical History:   Diagnosis Date   • Anesthesia     PT STATES SENSITIVE TO AND B/P DROPS   • Arthritis    • Bruises easily    • Emphysema lung (CMS/HCC)     WAS TOLD SHE HAD THIS PER CT SCAN MAY 2018.... NO INHALERS   • GERD (gastroesophageal reflux disease)    • GERD (gastroesophageal reflux disease)    • Hiatal hernia    • Hiatal hernia    • Hyperlipidemia    • Kidney calculi    •  "Kidney stone    • Recurrent UTI    • Right knee pain     \"BONE ON BONE\"       Allergies   Allergen Reactions   • Morphine Swelling     FELT LIKE THROAT SWELLED, SOME SWALLOWING TROUBLE   • Nickel Swelling       Past Surgical History:   Procedure Laterality Date   • BREAST SURGERY     •  SECTION     • CHOLECYSTECTOMY     • CYSTOSCOPY N/A 2016    Procedure: CYSTOSCOPY;  Surgeon: Mikey Aguirre MD;  Location: [facility];  Service:    • HYSTERECTOMY     • KIDNEY STONE SURGERY     • URETEROSCOPY LASER LITHOTRIPSY WITH STENT INSERTION Left 2016    Procedure: lt URETEROSCOPY LASER LITHOTRIPSY WITH stone basket extraction w/STENT INSERTION;  Surgeon: Hasmukh Krueger MD;  Location: [facility] OR;  Service:    • URETEROSCOPY LASER LITHOTRIPSY WITH STENT INSERTION Left 3/1/2019    Procedure: LEFT URETEROSCOPY LASER LITHOTRIPSY STONE BASKET EXTRACTION STENT;  Surgeon: Hasmukh Krueger MD;  Location: [facility];  Service: Urology       Family History   Problem Relation Age of Onset   • Heart disease Mother    • Cancer Father    • Malig Hyperthermia Neg Hx        Social History     Socioeconomic History   • Marital status:      Spouse name: Not on file   • Number of children: Not on file   • Years of education: Not on file   • Highest education level: Not on file   Tobacco Use   • Smoking status: Former Smoker     Years: 43.00     Types: Cigarettes     Last attempt to quit: 2016     Years since quittin.5   • Smokeless tobacco: Never Used   • Tobacco comment: QUIT 2018   Substance and Sexual Activity   • Alcohol use: No   • Drug use: No   • Sexual activity: Defer     Partners: Female           Objective   Physical Exam   Constitutional: She is oriented to person, place, and time. She appears well-developed and well-nourished.   HENT:   Head: Normocephalic and atraumatic.   Eyes: Conjunctivae and EOM are normal.   Neck: Normal range of motion. Neck supple.   Cardiovascular: " Normal rate, regular rhythm and normal heart sounds.   No murmur heard.  Pulmonary/Chest: Effort normal and breath sounds normal. No respiratory distress. She has no wheezes. She has no rales.   Abdominal: Soft. Bowel sounds are normal. She exhibits no distension. There is no tenderness.   Musculoskeletal: Normal range of motion. She exhibits no edema or tenderness.   Neurological: She is alert and oriented to person, place, and time.   Skin: Skin is warm and dry. No rash noted.   Psychiatric: She has a normal mood and affect. Her behavior is normal.   Nursing note and vitals reviewed.      Procedures           ED Course  ED Course as of Mar 13 0007   Wed Mar 13, 2019   0006 Patient was informed of stone visualized on the KUB.  Patient advised to contact Dr. Krueger in the morning.  Warnings discussed.  [ML]      ED Course User Index  [ML] Isael Morgan MD                  MDM  Number of Diagnoses or Management Options  Left ureteral calculus: new and requires workup     Amount and/or Complexity of Data Reviewed  Clinical lab tests: ordered and reviewed  Tests in the radiology section of CPT®: ordered and reviewed  Review and summarize past medical records: yes  Independent visualization of images, tracings, or specimens: yes    Risk of Complications, Morbidity, and/or Mortality  Presenting problems: high  Diagnostic procedures: moderate  Management options: high    Patient Progress  Patient progress: improved  My differential diagnosis for abdominal pain includes but is not limited to:  Gastritis, gastroenteritis, peptic ulcer disease, GERD, esophageal perforation, acute appendicitis, mesenteric adenitis, Meckel’s diverticulum, epiploic appendagitis, diverticulitis, colon cancer, ulcerative colitis, Crohn’s disease, intussusception, small bowel obstruction, adhesions, ischemic bowel, perforated viscus, ileus, obstipation, biliary colic, cholecystitis, cholelithiasis, Qamar-Jose Gregg, hepatitis,  pancreatitis, common bile duct obstruction, cholangitis, bile leak, splenic trauma, splenic rupture, splenic infarction, splenic abscess, abdominal abscess, ascites, spontaneous bacterial peritonitis, hernia, UTI, cystitis, prostatitis, ureterolithiasis, urinary obstruction, ovarian cyst, torsion, pregnancy, ectopic pregnancy, PID, pelvic abscess, mittelschmerz, endometriosis, AAA, myocardial infarction, pneumonia, cancer, porphyria, DKA, medications, sickle cell, viral syndrome, zoster    Labs this visit  Lab Results (last 24 hours)     Procedure Component Value Units Date/Time    Urinalysis With Microscopic If Indicated (No Culture) - Urine, Clean Catch [[id number]]  (Abnormal) Collected:  03/12/19 2110    Specimen:  Urine, Clean Catch Updated:  03/12/19 2122     Color, UA Yellow     Appearance, UA Clear     pH, UA 7.0     Specific Gravity, UA 1.010     Glucose, UA Negative     Ketones, UA Negative     Bilirubin, UA Negative     Blood, UA Small (1+)     Protein, UA Negative     Leuk Esterase, UA Negative     Nitrite, UA Negative     Urobilinogen, UA 0.2 E.U./dL    Urinalysis, Microscopic Only - Urine, Clean Catch [[id number]]  (Abnormal) Collected:  03/12/19 2110    Specimen:  Urine, Clean Catch Updated:  03/12/19 2152     RBC, UA 6-12 /HPF      WBC, UA 0-2 /HPF      Bacteria, UA Trace /HPF      Squamous Epithelial Cells, UA 0-2 /HPF      Hyaline Casts, UA None Seen /LPF      Methodology Manual Light Microscopy        Prescribed on discharge             Medication List      New Prescriptions    oxyCODONE-acetaminophen 7.5-325 MG per tablet  Commonly known as:  PERCOCET  Take 1 tablet by mouth Every 4 (Four) Hours As Needed for Severe Pain .     promethazine 25 MG tablet  Commonly known as:  PHENERGAN  Take 1 tablet by mouth Every 6 (Six) Hours As Needed for Nausea or   Vomiting for up to 12 doses.          All lab results, imaging results and other tests were reviewed by Isael Morgan MD and unless otherwise  specified were found to be unremarkable.        Final diagnoses:   Left ureteral calculus            Isael Morgan MD  03/13/19 0008

## 2019-03-15 ENCOUNTER — APPOINTMENT (OUTPATIENT)
Dept: GENERAL RADIOLOGY | Facility: HOSPITAL | Age: 59
End: 2019-03-15

## 2019-03-15 ENCOUNTER — HOSPITAL ENCOUNTER (OUTPATIENT)
Facility: HOSPITAL | Age: 59
Setting detail: HOSPITAL OUTPATIENT SURGERY
Discharge: HOME | End: 2019-03-15
Attending: UROLOGY | Admitting: UROLOGY

## 2019-03-15 ENCOUNTER — ANESTHESIA (OUTPATIENT)
Dept: PERIOP | Facility: HOSPITAL | Age: 59
End: 2019-03-15

## 2019-03-15 ENCOUNTER — ANESTHESIA EVENT (OUTPATIENT)
Dept: PERIOP | Facility: HOSPITAL | Age: 59
End: 2019-03-15

## 2019-03-15 VITALS
HEIGHT: 68 IN | WEIGHT: 188.93 LBS | TEMPERATURE: 98.5 F | RESPIRATION RATE: 16 BRPM | HEART RATE: 80 BPM | DIASTOLIC BLOOD PRESSURE: 80 MMHG | BODY MASS INDEX: 28.63 KG/M2 | OXYGEN SATURATION: 98 % | SYSTOLIC BLOOD PRESSURE: 123 MMHG

## 2019-03-15 DIAGNOSIS — N20.0 STONE, KIDNEY: ICD-10-CM

## 2019-03-15 PROBLEM — N20.1 LEFT URETERAL STONE: Status: ACTIVE | Noted: 2019-03-15

## 2019-03-15 PROCEDURE — 25010000002 PROPOFOL 10 MG/ML EMULSION: Performed by: ANESTHESIOLOGY

## 2019-03-15 PROCEDURE — C2617 STENT, NON-COR, TEM W/O DEL: HCPCS | Performed by: UROLOGY

## 2019-03-15 PROCEDURE — 25010000002 FENTANYL CITRATE (PF) 100 MCG/2ML SOLUTION: Performed by: ANESTHESIOLOGY

## 2019-03-15 PROCEDURE — 25010000002 MIDAZOLAM PER 1 MG: Performed by: ANESTHESIOLOGY

## 2019-03-15 PROCEDURE — 25010000003 CEFAZOLIN 1-4 GM/50ML-% SOLUTION: Performed by: UROLOGY

## 2019-03-15 PROCEDURE — 82360 CALCULUS ASSAY QUANT: CPT | Performed by: UROLOGY

## 2019-03-15 PROCEDURE — 25010000002 PHENYLEPHRINE PER 1 ML: Performed by: ANESTHESIOLOGY

## 2019-03-15 PROCEDURE — 74018 RADEX ABDOMEN 1 VIEW: CPT

## 2019-03-15 PROCEDURE — 25010000002 ONDANSETRON PER 1 MG: Performed by: ANESTHESIOLOGY

## 2019-03-15 PROCEDURE — 76000 FLUOROSCOPY <1 HR PHYS/QHP: CPT

## 2019-03-15 DEVICE — URETERAL STENT
Type: IMPLANTABLE DEVICE | Status: FUNCTIONAL
Brand: CONTOUR™

## 2019-03-15 RX ORDER — LIDOCAINE HYDROCHLORIDE 20 MG/ML
INJECTION, SOLUTION INFILTRATION; PERINEURAL AS NEEDED
Status: DISCONTINUED | OUTPATIENT
Start: 2019-03-15 | End: 2019-03-15 | Stop reason: SURG

## 2019-03-15 RX ORDER — ONDANSETRON 2 MG/ML
INJECTION INTRAMUSCULAR; INTRAVENOUS AS NEEDED
Status: DISCONTINUED | OUTPATIENT
Start: 2019-03-15 | End: 2019-03-15 | Stop reason: SURG

## 2019-03-15 RX ORDER — FENTANYL CITRATE 50 UG/ML
INJECTION, SOLUTION INTRAMUSCULAR; INTRAVENOUS AS NEEDED
Status: DISCONTINUED | OUTPATIENT
Start: 2019-03-15 | End: 2019-03-15 | Stop reason: SURG

## 2019-03-15 RX ORDER — LIDOCAINE HYDROCHLORIDE 10 MG/ML
0.5 INJECTION, SOLUTION EPIDURAL; INFILTRATION; INTRACAUDAL; PERINEURAL ONCE AS NEEDED
Status: DISCONTINUED | OUTPATIENT
Start: 2019-03-15 | End: 2019-03-15 | Stop reason: HOSPADM

## 2019-03-15 RX ORDER — PROMETHAZINE HYDROCHLORIDE 25 MG/1
25 TABLET ORAL ONCE AS NEEDED
Status: DISCONTINUED | OUTPATIENT
Start: 2019-03-15 | End: 2019-03-15 | Stop reason: HOSPADM

## 2019-03-15 RX ORDER — MIDAZOLAM HYDROCHLORIDE 1 MG/ML
2 INJECTION INTRAMUSCULAR; INTRAVENOUS
Status: DISCONTINUED | OUTPATIENT
Start: 2019-03-15 | End: 2019-03-15 | Stop reason: HOSPADM

## 2019-03-15 RX ORDER — MAGNESIUM HYDROXIDE 1200 MG/15ML
LIQUID ORAL AS NEEDED
Status: DISCONTINUED | OUTPATIENT
Start: 2019-03-15 | End: 2019-03-15 | Stop reason: HOSPADM

## 2019-03-15 RX ORDER — OXYCODONE AND ACETAMINOPHEN 7.5; 325 MG/1; MG/1
1 TABLET ORAL EVERY 4 HOURS PRN
Status: DISCONTINUED | OUTPATIENT
Start: 2019-03-15 | End: 2019-03-15 | Stop reason: HOSPADM

## 2019-03-15 RX ORDER — PROMETHAZINE HYDROCHLORIDE 25 MG/1
25 SUPPOSITORY RECTAL ONCE AS NEEDED
Status: DISCONTINUED | OUTPATIENT
Start: 2019-03-15 | End: 2019-03-15 | Stop reason: HOSPADM

## 2019-03-15 RX ORDER — FENTANYL CITRATE 50 UG/ML
50 INJECTION, SOLUTION INTRAMUSCULAR; INTRAVENOUS
Status: DISCONTINUED | OUTPATIENT
Start: 2019-03-15 | End: 2019-03-15 | Stop reason: HOSPADM

## 2019-03-15 RX ORDER — ONDANSETRON 2 MG/ML
4 INJECTION INTRAMUSCULAR; INTRAVENOUS ONCE AS NEEDED
Status: DISCONTINUED | OUTPATIENT
Start: 2019-03-15 | End: 2019-03-15 | Stop reason: HOSPADM

## 2019-03-15 RX ORDER — MIDAZOLAM HYDROCHLORIDE 1 MG/ML
1 INJECTION INTRAMUSCULAR; INTRAVENOUS
Status: DISCONTINUED | OUTPATIENT
Start: 2019-03-15 | End: 2019-03-15 | Stop reason: HOSPADM

## 2019-03-15 RX ORDER — EPHEDRINE SULFATE 50 MG/ML
5 INJECTION, SOLUTION INTRAVENOUS ONCE AS NEEDED
Status: DISCONTINUED | OUTPATIENT
Start: 2019-03-15 | End: 2019-03-15 | Stop reason: HOSPADM

## 2019-03-15 RX ORDER — HYDROCODONE BITARTRATE AND ACETAMINOPHEN 7.5; 325 MG/1; MG/1
1 TABLET ORAL ONCE AS NEEDED
Status: DISCONTINUED | OUTPATIENT
Start: 2019-03-15 | End: 2019-03-15 | Stop reason: HOSPADM

## 2019-03-15 RX ORDER — FAMOTIDINE 10 MG/ML
20 INJECTION, SOLUTION INTRAVENOUS ONCE
Status: COMPLETED | OUTPATIENT
Start: 2019-03-15 | End: 2019-03-15

## 2019-03-15 RX ORDER — PROPOFOL 10 MG/ML
VIAL (ML) INTRAVENOUS AS NEEDED
Status: DISCONTINUED | OUTPATIENT
Start: 2019-03-15 | End: 2019-03-15 | Stop reason: SURG

## 2019-03-15 RX ORDER — HYDROCODONE BITARTRATE AND ACETAMINOPHEN 7.5; 325 MG/1; MG/1
1 TABLET ORAL ONCE AS NEEDED
Status: COMPLETED | OUTPATIENT
Start: 2019-03-15 | End: 2019-03-15

## 2019-03-15 RX ORDER — HYDROMORPHONE HYDROCHLORIDE 1 MG/ML
0.5 INJECTION, SOLUTION INTRAMUSCULAR; INTRAVENOUS; SUBCUTANEOUS
Status: DISCONTINUED | OUTPATIENT
Start: 2019-03-15 | End: 2019-03-15 | Stop reason: HOSPADM

## 2019-03-15 RX ORDER — ONDANSETRON 4 MG/1
4 TABLET, FILM COATED ORAL ONCE AS NEEDED
Status: DISCONTINUED | OUTPATIENT
Start: 2019-03-15 | End: 2019-03-15 | Stop reason: HOSPADM

## 2019-03-15 RX ORDER — PROMETHAZINE HYDROCHLORIDE 25 MG/ML
6.25 INJECTION, SOLUTION INTRAMUSCULAR; INTRAVENOUS ONCE AS NEEDED
Status: DISCONTINUED | OUTPATIENT
Start: 2019-03-15 | End: 2019-03-15 | Stop reason: HOSPADM

## 2019-03-15 RX ORDER — CEFAZOLIN SODIUM 1 G/50ML
1 INJECTION, SOLUTION INTRAVENOUS ONCE
Status: COMPLETED | OUTPATIENT
Start: 2019-03-15 | End: 2019-03-15

## 2019-03-15 RX ORDER — SODIUM CHLORIDE, SODIUM LACTATE, POTASSIUM CHLORIDE, CALCIUM CHLORIDE 600; 310; 30; 20 MG/100ML; MG/100ML; MG/100ML; MG/100ML
9 INJECTION, SOLUTION INTRAVENOUS CONTINUOUS
Status: DISCONTINUED | OUTPATIENT
Start: 2019-03-15 | End: 2019-03-15 | Stop reason: HOSPADM

## 2019-03-15 RX ORDER — SODIUM CHLORIDE 0.9 % (FLUSH) 0.9 %
1-10 SYRINGE (ML) INJECTION AS NEEDED
Status: DISCONTINUED | OUTPATIENT
Start: 2019-03-15 | End: 2019-03-15 | Stop reason: HOSPADM

## 2019-03-15 RX ORDER — FLUMAZENIL 0.1 MG/ML
0.2 INJECTION INTRAVENOUS AS NEEDED
Status: DISCONTINUED | OUTPATIENT
Start: 2019-03-15 | End: 2019-03-15 | Stop reason: HOSPADM

## 2019-03-15 RX ADMIN — FAMOTIDINE 20 MG: 10 INJECTION INTRAVENOUS at 16:57

## 2019-03-15 RX ADMIN — CEFAZOLIN SODIUM 1 G: 1 INJECTION, SOLUTION INTRAVENOUS at 17:46

## 2019-03-15 RX ADMIN — MIDAZOLAM 2 MG: 1 INJECTION INTRAMUSCULAR; INTRAVENOUS at 17:28

## 2019-03-15 RX ADMIN — PROPOFOL 200 MG: 10 INJECTION, EMULSION INTRAVENOUS at 17:50

## 2019-03-15 RX ADMIN — PHENYLEPHRINE HYDROCHLORIDE 100 MCG: 10 INJECTION INTRAVENOUS at 18:03

## 2019-03-15 RX ADMIN — FENTANYL CITRATE 50 MCG: 50 INJECTION INTRAMUSCULAR; INTRAVENOUS at 17:48

## 2019-03-15 RX ADMIN — ONDANSETRON 4 MG: 2 INJECTION INTRAMUSCULAR; INTRAVENOUS at 18:15

## 2019-03-15 RX ADMIN — PROPOFOL 100 MG: 10 INJECTION, EMULSION INTRAVENOUS at 17:51

## 2019-03-15 RX ADMIN — SODIUM CHLORIDE, POTASSIUM CHLORIDE, SODIUM LACTATE AND CALCIUM CHLORIDE: 600; 310; 30; 20 INJECTION, SOLUTION INTRAVENOUS at 17:45

## 2019-03-15 RX ADMIN — PHENYLEPHRINE HYDROCHLORIDE 100 MCG: 10 INJECTION INTRAVENOUS at 18:07

## 2019-03-15 RX ADMIN — FENTANYL CITRATE 50 MCG: 50 INJECTION INTRAMUSCULAR; INTRAVENOUS at 17:57

## 2019-03-15 RX ADMIN — LIDOCAINE HYDROCHLORIDE 60 MG: 20 INJECTION, SOLUTION INFILTRATION; PERINEURAL at 17:48

## 2019-03-15 RX ADMIN — PROPOFOL 50 MG: 10 INJECTION, EMULSION INTRAVENOUS at 18:06

## 2019-03-15 RX ADMIN — SODIUM CHLORIDE, POTASSIUM CHLORIDE, SODIUM LACTATE AND CALCIUM CHLORIDE 9 ML/HR: 600; 310; 30; 20 INJECTION, SOLUTION INTRAVENOUS at 16:57

## 2019-03-15 RX ADMIN — FENTANYL CITRATE 50 MCG: 50 INJECTION, SOLUTION INTRAMUSCULAR; INTRAVENOUS at 16:57

## 2019-03-15 RX ADMIN — FENTANYL CITRATE 50 MCG: 50 INJECTION, SOLUTION INTRAMUSCULAR; INTRAVENOUS at 18:45

## 2019-03-15 RX ADMIN — FENTANYL CITRATE 50 MCG: 50 INJECTION, SOLUTION INTRAMUSCULAR; INTRAVENOUS at 19:05

## 2019-03-15 RX ADMIN — HYDROCODONE BITARTRATE AND ACETAMINOPHEN 1 TABLET: 7.5; 325 TABLET ORAL at 19:10

## 2019-03-15 NOTE — ANESTHESIA PREPROCEDURE EVALUATION
Anesthesia Evaluation     Patient summary reviewed   no history of anesthetic complications:  NPO Solid Status: > 8 hours  NPO Liquid Status: > 2 hours           Airway   Mallampati: III  TM distance: >3 FB  Neck ROM: full  Dental      Pulmonary     breath sounds clear to auscultation  (+) a smoker Former, COPD mild,   (-) shortness of breath  Cardiovascular   Exercise tolerance: good (4-7 METS)    Rhythm: regular  Rate: normal    (+) hyperlipidemia,   (-) angina, COLLADO      Neuro/Psych  GI/Hepatic/Renal/Endo    (+)  hiatal hernia, GERD well controlled,      Musculoskeletal     Abdominal    Substance History      OB/GYN          Other   (+) arthritis                     Anesthesia Plan    ASA 2     general     intravenous induction   Anesthetic plan, all risks, benefits, and alternatives have been provided, discussed and informed consent has been obtained with: patient and spouse/significant other.

## 2019-03-15 NOTE — BRIEF OP NOTE
URETEROSCOPY LASER LITHOTRIPSY WITH STENT INSERTION  Progress Note    Jennifer Arndt  3/15/2019    Pre-op Diagnosis:   Lt 8 mm distal ureteral stone hydro colic        Post-Op Diagnosis Codes:   same fragmented extracted stent placed     Procedure/CPT® Codes:      Procedure(s):  LEFT URETEROSCOPY LASER LITHOTRIPSY WITH STENT INSERTION STONE BASKET EXTRACTION    Surgeon(s):  Hasmukh Krueger MD    Anesthesia: General    Staff:   Circulator: Mohan De La Cruz RN  Laser Staff: Spurling, Shannon, RN  Radiology Technologist: Fatou Murguia  Scrub Person: Jaida Farncis    Estimated Blood Loss: none    Urine Voided: * No values recorded between 3/15/2019  5:46 PM and 3/15/2019  6:18 PM *    Specimens:                ID Type Source Tests Collected by Time   1 : left ureteral calculus for analysis Calculus Ureter, Left STONE ANALYSIS Hasmukh Krueger MD 3/15/2019 1812         Drains:      Findings:     Complications:      Hasmukh Krueger MD     Date: 3/15/2019  Time: 6:32 PM

## 2019-03-15 NOTE — ANESTHESIA POSTPROCEDURE EVALUATION
Patient: Jennifer Arndt    Procedure Summary     Date:  03/15/19 Room / Location:  [facility] OR 01 / [facility] MAIN OR    Anesthesia Start:  1746 Anesthesia Stop:  1840    Procedure:  LEFT URETEROSCOPY LASER LITHOTRIPSY WITH STENT INSERTION STONE BASKET EXTRACTION (Left ) Diagnosis:      Surgeon:  Hasmukh Krueger MD Provider:  Vinnie Bedoya MD    Anesthesia Type:  general ASA Status:  2          Anesthesia Type: general  Last vitals  BP   136/92 (03/15/19 1930)   Temp   36.9 °C (98.5 °F) (03/15/19 1840)   Pulse   82 (03/15/19 1930)   Resp   16 (03/15/19 1930)     SpO2   97 % (03/15/19 1930)     Post Anesthesia Care and Evaluation    Patient location during evaluation: bedside  Patient participation: complete - patient participated  Level of consciousness: awake  Pain management: adequate  Airway patency: patent  Anesthetic complications: No anesthetic complications    Cardiovascular status: acceptable  Respiratory status: acceptable  Hydration status: acceptable

## 2019-03-15 NOTE — OP NOTE
Preoperative diagnosis left distal ureteral stone 8 mm hydronephrosis and colic inability to pass    Postoperative diagnosis is the same        Operative procedure cystoscopy left ureteroscopy holmium laser fragmentation stone basket extraction of stone fragments sent for chemical analysis and placement of a 6 [place] by 26 cm double-J stent without tether under fluoroscopic guidance    Surgeon Evans Edmond general    Procedure note 58-year-old white female plagued with recurrent stone disease with presenting herself with the above-mentioned findings undergo the above-mentioned procedure procedure attendant risks were discussed and understood proceed    Site was marked antibiotics were given timeout was taken after general anesthesia was placed very carefully in a modified lithotomy position all pressure points relieved  exam reveals no pelvic organ prolapse    She is prepped draped sterile fashion genitalia 2% intraurethral lidocaine jelly was administered scope was advanced to the bladder urethra was normal bladder neck was closed trigone was normal small stone  at the left ureteral orifice.  A sensor guidewire is placed on the left side liver glide dilation was done approximately 10 [place] with the stone holmium laser fragmentation with the stone fragmentation was fragment removed without difficulty reinspection carried the safety wire all the way up above the pelvic brim under direct utilization into the collecting system    Your scope was then removed    The scope was placed back over the guidewire a 6 [place] by 26 cm stent was placed without tether with good curl in the renal pelvis and bladder the bladder is a partially filled patient procedure well was sent to the recovery room in satisfactory condition.    I called her  and he at [phone] explained findings and follow-up accordingly my office will call her for follow-up for stent removal in about a week prescriptions written by me  include a Norco Phenergan and Pyridium and Macrobid with an outpatient instruction sheet with progressive diet and activities office numbers given cases and concerns to contact me.

## 2019-03-15 NOTE — DISCHARGE INSTRUCTIONS
**Refer to Dr. Krueger's Post-op Instruction sheet.**      SEDATION DISCHARGE INSTRUCTIONS.    IMPORTANT: The following information will help you return to your best level of health.  GENERAL ANESTHESIA.  You have had general anesthesia. You were given a medicine to help you go to sleep and not feel pain.    Follow these instructions:   Go right home. Rest quietly at home today, then you can be up and about.   Do not drink alcohol, drive or operate machinery for 24 hours.   Do not make any important decisions or sign any legal papers in the next 24 hours.   Have a RESPONSIBLE PERSON stay with you the rest of today and overnight for your protection and safety.   Start your diet with fluids and light foods (jello, soup, juice, toast). Then eat a normal diet if not nauseated.    Call your doctor if you have:   any blue or gray skin color.   repeated vomiting.   trouble breathing.   any new problems or concerns.

## 2019-03-15 NOTE — ANESTHESIA PROCEDURE NOTES
Airway  Urgency: elective    Date/Time: 3/15/2019 5:51 PM  Airway not difficult    General Information and Staff    Patient location during procedure: OR  Anesthesiologist: Vinnie Bedoya MD    Indications and Patient Condition  Indications for airway management: airway protection    Preoxygenated: yes  Mask difficulty assessment: 1 - vent by mask    Final Airway Details  Final airway type: supraglottic airway      Successful airway: classic  Size 4    Number of attempts at approach: 1    Additional Comments  Pre oxygenated  Easy mask vent  Atraumatic lma placement  +etco2

## 2019-03-15 NOTE — INTERVAL H&P NOTE
H&P reviewed. The patient was examined and there are no changes to the H&P.      There were no vitals taken for this visit.

## 2019-03-21 LAB
CA PHOS CRY STONE QL IR: 95 %
COLOR STONE: NORMAL
COM CRY STONE QL IR: 5 %
COMPN STONE: NORMAL
CONV COMMENT: NORMAL
Lab: NORMAL
Lab: NORMAL
NIDUS STONE QL: NORMAL
PATH REPORT.COMMENTS IMP SPEC: NORMAL
SIZE STONE: NORMAL MM
WT STONE: 37 MG

## 2019-08-02 ENCOUNTER — TRANSCRIBE ORDERS (OUTPATIENT)
Dept: ADMINISTRATIVE | Facility: HOSPITAL | Age: 59
End: 2019-08-02

## 2019-08-02 DIAGNOSIS — R91.1 LUNG NODULE: Primary | ICD-10-CM

## 2019-08-08 ENCOUNTER — HOSPITAL ENCOUNTER (EMERGENCY)
Facility: HOSPITAL | Age: 59
Discharge: HOME | End: 2019-08-08
Attending: EMERGENCY MEDICINE | Admitting: EMERGENCY MEDICINE

## 2019-08-08 VITALS
SYSTOLIC BLOOD PRESSURE: 150 MMHG | TEMPERATURE: 97.9 F | RESPIRATION RATE: 16 BRPM | HEART RATE: 78 BPM | OXYGEN SATURATION: 97 % | HEIGHT: 68 IN | WEIGHT: 188 LBS | DIASTOLIC BLOOD PRESSURE: 87 MMHG | BODY MASS INDEX: 28.49 KG/M2

## 2019-08-08 DIAGNOSIS — S29.019A THORACIC MYOFASCIAL STRAIN, INITIAL ENCOUNTER: ICD-10-CM

## 2019-08-08 DIAGNOSIS — S16.1XXA ACUTE STRAIN OF NECK MUSCLE, INITIAL ENCOUNTER: ICD-10-CM
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
## 2019-08-08 PROCEDURE — 99284 EMERGENCY DEPT VISIT MOD MDM: CPT

## 2019-08-08 PROCEDURE — 99282 EMERGENCY DEPT VISIT SF MDM: CPT | Performed by: EMERGENCY MEDICINE

## 2019-08-08 RX ORDER — CYCLOBENZAPRINE HCL 10 MG
10 TABLET ORAL 3 TIMES DAILY PRN
Qty: 24 TABLET | Refills: 0 | Status: SHIPPED | OUTPATIENT
Start: 2019-08-08 | End: 2020-12-11

## 2019-08-08 RX ORDER — DICLOFENAC SODIUM 75 MG/1
75 TABLET, DELAYED RELEASE ORAL 2 TIMES DAILY PRN
Qty: 20 TABLET | Refills: 0 | Status: SHIPPED | OUTPATIENT
Start: 2019-08-08 | End: 2020-12-11
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
## 2019-11-14 ENCOUNTER — TRANSCRIBE ORDERS (OUTPATIENT)
Dept: ADMINISTRATIVE | Facility: HOSPITAL | Age: 59
End: 2019-11-14

## 2019-11-14 ENCOUNTER — LAB (OUTPATIENT)
Dept: LAB | Facility: HOSPITAL | Age: 59
End: 2019-11-14

## 2019-11-14 DIAGNOSIS — N18.30 CHRONIC KIDNEY DISEASE, STAGE III (MODERATE) (HCC): ICD-10-CM

## 2019-11-14 DIAGNOSIS — N18.30 CHRONIC KIDNEY DISEASE, STAGE III (MODERATE) (HCC): Primary | ICD-10-CM

## 2019-11-14 LAB
25(OH)D3 SERPL-MCNC: 33.5 NG/ML (ref 30–100)
BACTERIA UR QL AUTO: ABNORMAL /HPF
BILIRUB UR QL STRIP: NEGATIVE
CA-I BLD-MCNC: 4.8 MG/DL (ref 4.6–5.4)
CLARITY UR: CLEAR
COLOR UR: YELLOW
CREAT UR-MCNC: 38.5 MG/DL
GLUCOSE UR STRIP-MCNC: NEGATIVE MG/DL
HGB UR QL STRIP.AUTO: NEGATIVE
HYALINE CASTS UR QL AUTO: ABNORMAL /LPF
KETONES UR QL STRIP: NEGATIVE
LEUKOCYTE ESTERASE UR QL STRIP.AUTO: ABNORMAL
NITRITE UR QL STRIP: NEGATIVE
PH UR STRIP.AUTO: 7 [PH] (ref 5–8)
PROT UR QL STRIP: NEGATIVE
PTH-INTACT SERPL-MCNC: 31.8 PG/ML (ref 15–65)
RBC # UR: ABNORMAL /HPF
REF LAB TEST METHOD: ABNORMAL
SP GR UR STRIP: 1.01 (ref 1–1.03)
SQUAMOUS #/AREA URNS HPF: ABNORMAL /HPF
UROBILINOGEN UR QL STRIP: ABNORMAL
WBC UR QL AUTO: ABNORMAL /HPF

## 2019-11-14 PROCEDURE — 36415 COLL VENOUS BLD VENIPUNCTURE: CPT

## 2019-11-14 PROCEDURE — 81003 URINALYSIS AUTO W/O SCOPE: CPT | Performed by: INTERNAL MEDICINE

## 2019-11-14 PROCEDURE — 87186 SC STD MICRODIL/AGAR DIL: CPT | Performed by: INTERNAL MEDICINE

## 2019-11-14 PROCEDURE — 83970 ASSAY OF PARATHORMONE: CPT

## 2019-11-14 PROCEDURE — 81015 MICROSCOPIC EXAM OF URINE: CPT | Performed by: INTERNAL MEDICINE

## 2019-11-14 PROCEDURE — 87086 URINE CULTURE/COLONY COUNT: CPT | Performed by: INTERNAL MEDICINE

## 2019-11-14 PROCEDURE — 87088 URINE BACTERIA CULTURE: CPT | Performed by: INTERNAL MEDICINE

## 2019-11-14 PROCEDURE — 83883 ASSAY NEPHELOMETRY NOT SPEC: CPT

## 2019-11-14 PROCEDURE — 82306 VITAMIN D 25 HYDROXY: CPT

## 2019-11-14 PROCEDURE — 84165 PROTEIN E-PHORESIS SERUM: CPT

## 2019-11-14 PROCEDURE — 82570 ASSAY OF URINE CREATININE: CPT

## 2019-11-14 PROCEDURE — 82330 ASSAY OF CALCIUM: CPT | Performed by: INTERNAL MEDICINE

## 2019-11-14 PROCEDURE — 84155 ASSAY OF PROTEIN SERUM: CPT

## 2019-11-15 LAB
ALBUMIN SERPL-MCNC: 4 G/DL (ref 2.9–4.4)
ALBUMIN/GLOB SERPL: 0.8 {RATIO} (ref 0.7–1.7)
ALPHA1 GLOB FLD ELPH-MCNC: 0.2 G/DL (ref 0–0.4)
ALPHA2 GLOB SERPL ELPH-MCNC: 1 G/DL (ref 0.4–1)
B-GLOBULIN SERPL ELPH-MCNC: 1.1 G/DL (ref 0.7–1.3)
GAMMA GLOB SERPL ELPH-MCNC: 2.5 G/DL (ref 0.4–1.8)
GLOBULIN SER CALC-MCNC: 4.9 G/DL (ref 2.2–3.9)
KAPPA LC SERPL-MCNC: 79.7 MG/L (ref 3.3–19.4)
KAPPA LC/LAMBDA SER: 2.01 {RATIO} (ref 0.26–1.65)
LAMBDA LC FREE SERPL-MCNC: 39.6 MG/L (ref 5.7–26.3)
Lab: ABNORMAL
M-SPIKE: ABNORMAL G/DL
PROT PATTERN SERPL ELPH-IMP: ABNORMAL
PROT SERPL-MCNC: 8.9 G/DL (ref 6–8.5)

## 2019-11-16 LAB — BACTERIA SPEC AEROBE CULT: ABNORMAL

## 2019-11-17 ENCOUNTER — LAB (OUTPATIENT)
Dept: LAB | Facility: HOSPITAL | Age: 59
End: 2019-11-17

## 2019-11-17 DIAGNOSIS — N18.30 CHRONIC KIDNEY DISEASE, STAGE III (MODERATE) (HCC): ICD-10-CM

## 2019-11-17 LAB
COLLECT DURATION TIME UR: 24 HRS
PROT 24H UR-MRATE: 662.5 MG/24HOURS (ref 0–150)
PROT UR-MCNC: 25 MG/DL
SPECIMEN VOL 24H UR: 2650 ML

## 2019-11-17 PROCEDURE — 84156 ASSAY OF PROTEIN URINE: CPT

## 2019-11-17 PROCEDURE — 81050 URINALYSIS VOLUME MEASURE: CPT

## 2020-11-04 ENCOUNTER — LAB (OUTPATIENT)
Dept: LAB | Facility: HOSPITAL | Age: 60
End: 2020-11-04

## 2020-11-04 ENCOUNTER — TRANSCRIBE ORDERS (OUTPATIENT)
Dept: ADMINISTRATIVE | Facility: HOSPITAL | Age: 60
End: 2020-11-04

## 2020-11-04 DIAGNOSIS — N18.30 MALIGNANT HYPERTENSION WITH CHRONIC KIDNEY DISEASE STAGE III (HCC): Primary | ICD-10-CM

## 2020-11-04 DIAGNOSIS — I12.9 MALIGNANT HYPERTENSION WITH CHRONIC KIDNEY DISEASE STAGE III (HCC): Primary | ICD-10-CM

## 2020-11-04 DIAGNOSIS — N18.30 MALIGNANT HYPERTENSION WITH CHRONIC KIDNEY DISEASE STAGE III (HCC): ICD-10-CM

## 2020-11-04 DIAGNOSIS — I12.9 MALIGNANT HYPERTENSION WITH CHRONIC KIDNEY DISEASE STAGE III (HCC): ICD-10-CM

## 2020-11-04 LAB
ANION GAP SERPL CALCULATED.3IONS-SCNC: 9.8 MMOL/L (ref 5–15)
BACTERIA UR QL AUTO: ABNORMAL /HPF
BASOPHILS # BLD AUTO: 0.06 10*3/MM3 (ref 0–0.2)
BASOPHILS NFR BLD AUTO: 1 % (ref 0–1.5)
BILIRUB UR QL STRIP: NEGATIVE
BUN SERPL-MCNC: 21 MG/DL (ref 8–23)
BUN/CREAT SERPL: 16.4 (ref 7–25)
CALCIUM SPEC-SCNC: 9.8 MG/DL (ref 8.6–10.5)
CHLORIDE SERPL-SCNC: 106 MMOL/L (ref 98–107)
CLARITY UR: CLEAR
CO2 SERPL-SCNC: 22.2 MMOL/L (ref 22–29)
COLOR UR: YELLOW
CREAT SERPL-MCNC: 1.28 MG/DL (ref 0.57–1)
DEPRECATED RDW RBC AUTO: 44 FL (ref 37–54)
EOSINOPHIL # BLD AUTO: 0.21 10*3/MM3 (ref 0–0.4)
EOSINOPHIL NFR BLD AUTO: 3.4 % (ref 0.3–6.2)
ERYTHROCYTE [DISTWIDTH] IN BLOOD BY AUTOMATED COUNT: 13 % (ref 12.3–15.4)
GFR SERPL CREATININE-BSD FRML MDRD: 42 ML/MIN/1.73
GLUCOSE SERPL-MCNC: 93 MG/DL (ref 65–99)
GLUCOSE UR STRIP-MCNC: NEGATIVE MG/DL
HCT VFR BLD AUTO: 39.6 % (ref 34–46.6)
HGB BLD-MCNC: 13.2 G/DL (ref 12–15.9)
HGB UR QL STRIP.AUTO: NEGATIVE
HYALINE CASTS UR QL AUTO: ABNORMAL /LPF
IMM GRANULOCYTES # BLD AUTO: 0.01 10*3/MM3 (ref 0–0.05)
IMM GRANULOCYTES NFR BLD AUTO: 0.2 % (ref 0–0.5)
KETONES UR QL STRIP: NEGATIVE
LEUKOCYTE ESTERASE UR QL STRIP.AUTO: ABNORMAL
LYMPHOCYTES # BLD AUTO: 2.31 10*3/MM3 (ref 0.7–3.1)
LYMPHOCYTES NFR BLD AUTO: 36.9 % (ref 19.6–45.3)
MCH RBC QN AUTO: 30.6 PG (ref 26.6–33)
MCHC RBC AUTO-ENTMCNC: 33.3 G/DL (ref 31.5–35.7)
MCV RBC AUTO: 91.9 FL (ref 79–97)
MONOCYTES # BLD AUTO: 0.94 10*3/MM3 (ref 0.1–0.9)
MONOCYTES NFR BLD AUTO: 15 % (ref 5–12)
NEUTROPHILS NFR BLD AUTO: 2.73 10*3/MM3 (ref 1.7–7)
NEUTROPHILS NFR BLD AUTO: 43.5 % (ref 42.7–76)
NITRITE UR QL STRIP: NEGATIVE
NRBC BLD AUTO-RTO: 0 /100 WBC (ref 0–0.2)
PH UR STRIP.AUTO: 7 [PH] (ref 5–8)
PLATELET # BLD AUTO: 248 10*3/MM3 (ref 140–450)
PMV BLD AUTO: 11.3 FL (ref 6–12)
POTASSIUM SERPL-SCNC: 4.2 MMOL/L (ref 3.5–5.2)
PROT UR QL STRIP: NEGATIVE
RBC # BLD AUTO: 4.31 10*6/MM3 (ref 3.77–5.28)
RBC # UR: ABNORMAL /HPF
REF LAB TEST METHOD: ABNORMAL
SODIUM SERPL-SCNC: 138 MMOL/L (ref 136–145)
SP GR UR STRIP: 1.01 (ref 1–1.03)
SQUAMOUS #/AREA URNS HPF: ABNORMAL /HPF
UROBILINOGEN UR QL STRIP: ABNORMAL
WBC # BLD AUTO: 6.26 10*3/MM3 (ref 3.4–10.8)
WBC UR QL AUTO: ABNORMAL /HPF

## 2020-11-04 PROCEDURE — 36415 COLL VENOUS BLD VENIPUNCTURE: CPT

## 2020-11-04 PROCEDURE — 80048 BASIC METABOLIC PNL TOTAL CA: CPT

## 2020-11-04 PROCEDURE — 87186 SC STD MICRODIL/AGAR DIL: CPT | Performed by: INTERNAL MEDICINE

## 2020-11-04 PROCEDURE — 81001 URINALYSIS AUTO W/SCOPE: CPT | Performed by: INTERNAL MEDICINE

## 2020-11-04 PROCEDURE — 85025 COMPLETE CBC W/AUTO DIFF WBC: CPT

## 2020-11-04 PROCEDURE — 87086 URINE CULTURE/COLONY COUNT: CPT | Performed by: INTERNAL MEDICINE

## 2020-11-06 LAB — BACTERIA SPEC AEROBE CULT: ABNORMAL

## 2020-12-11 ENCOUNTER — LAB (OUTPATIENT)
Dept: LAB | Facility: HOSPITAL | Age: 60
End: 2020-12-11

## 2020-12-11 ENCOUNTER — CONSULT (OUTPATIENT)
Dept: ONCOLOGY | Facility: CLINIC | Age: 60
End: 2020-12-11

## 2020-12-11 VITALS
HEIGHT: 67 IN | RESPIRATION RATE: 16 BRPM | TEMPERATURE: 96.5 F | DIASTOLIC BLOOD PRESSURE: 81 MMHG | HEART RATE: 69 BPM | SYSTOLIC BLOOD PRESSURE: 149 MMHG | WEIGHT: 195.2 LBS | OXYGEN SATURATION: 96 % | BODY MASS INDEX: 30.64 KG/M2

## 2020-12-11 DIAGNOSIS — I12.9 MALIGNANT HYPERTENSION WITH CHRONIC KIDNEY DISEASE STAGE III (HCC): Primary | ICD-10-CM

## 2020-12-11 DIAGNOSIS — N18.30 MALIGNANT HYPERTENSION WITH CHRONIC KIDNEY DISEASE STAGE III (HCC): Primary | ICD-10-CM

## 2020-12-11 DIAGNOSIS — R79.9 ABNORMAL SERUM TOTAL PROTEIN LEVEL: Primary | ICD-10-CM

## 2020-12-11 LAB
ALBUMIN SERPL-MCNC: 4.3 G/DL (ref 3.5–5.2)
ALBUMIN/GLOB SERPL: 1 G/DL (ref 1.1–2.4)
ALP SERPL-CCNC: 71 U/L (ref 38–116)
ALT SERPL W P-5'-P-CCNC: 33 U/L (ref 0–33)
ANION GAP SERPL CALCULATED.3IONS-SCNC: 10.2 MMOL/L (ref 5–15)
AST SERPL-CCNC: 36 U/L (ref 0–32)
B2 MICROGLOB SERPL-MCNC: 4.7 MG/L (ref 0.8–2.2)
BASOPHILS # BLD AUTO: 0.07 10*3/MM3 (ref 0–0.2)
BASOPHILS NFR BLD AUTO: 1.1 % (ref 0–1.5)
BILIRUB SERPL-MCNC: 0.4 MG/DL (ref 0.2–1.2)
BUN SERPL-MCNC: 20 MG/DL (ref 6–20)
BUN/CREAT SERPL: 17.1 (ref 7.3–30)
CALCIUM SPEC-SCNC: 9.8 MG/DL (ref 8.5–10.2)
CHLORIDE SERPL-SCNC: 107 MMOL/L (ref 98–107)
CO2 SERPL-SCNC: 21.8 MMOL/L (ref 22–29)
CREAT SERPL-MCNC: 1.17 MG/DL (ref 0.6–1.1)
DEPRECATED RDW RBC AUTO: 44.2 FL (ref 37–54)
EOSINOPHIL # BLD AUTO: 0.26 10*3/MM3 (ref 0–0.4)
EOSINOPHIL NFR BLD AUTO: 4.2 % (ref 0.3–6.2)
ERYTHROCYTE [DISTWIDTH] IN BLOOD BY AUTOMATED COUNT: 13.1 % (ref 12.3–15.4)
GFR SERPL CREATININE-BSD FRML MDRD: 47 ML/MIN/1.73
GLOBULIN UR ELPH-MCNC: 4.3 GM/DL (ref 1.8–3.5)
GLUCOSE SERPL-MCNC: 106 MG/DL (ref 74–124)
HCT VFR BLD AUTO: 41 % (ref 34–46.6)
HGB BLD-MCNC: 13.8 G/DL (ref 12–15.9)
IMM GRANULOCYTES # BLD AUTO: 0.03 10*3/MM3 (ref 0–0.05)
IMM GRANULOCYTES NFR BLD AUTO: 0.5 % (ref 0–0.5)
LYMPHOCYTES # BLD AUTO: 1.73 10*3/MM3 (ref 0.7–3.1)
LYMPHOCYTES NFR BLD AUTO: 27.9 % (ref 19.6–45.3)
MCH RBC QN AUTO: 31.2 PG (ref 26.6–33)
MCHC RBC AUTO-ENTMCNC: 33.7 G/DL (ref 31.5–35.7)
MCV RBC AUTO: 92.6 FL (ref 79–97)
MONOCYTES # BLD AUTO: 0.93 10*3/MM3 (ref 0.1–0.9)
MONOCYTES NFR BLD AUTO: 15 % (ref 5–12)
NEUTROPHILS NFR BLD AUTO: 3.19 10*3/MM3 (ref 1.7–7)
NEUTROPHILS NFR BLD AUTO: 51.3 % (ref 42.7–76)
NRBC BLD AUTO-RTO: 0 /100 WBC (ref 0–0.2)
PLATELET # BLD AUTO: 207 10*3/MM3 (ref 140–450)
PMV BLD AUTO: 10.3 FL (ref 6–12)
POTASSIUM SERPL-SCNC: 4.3 MMOL/L (ref 3.5–4.7)
PROT SERPL-MCNC: 8.6 G/DL (ref 6.3–8)
RBC # BLD AUTO: 4.43 10*6/MM3 (ref 3.77–5.28)
SODIUM SERPL-SCNC: 139 MMOL/L (ref 134–145)
WBC # BLD AUTO: 6.21 10*3/MM3 (ref 3.4–10.8)

## 2020-12-11 PROCEDURE — 82232 ASSAY OF BETA-2 PROTEIN: CPT | Performed by: INTERNAL MEDICINE

## 2020-12-11 PROCEDURE — 85025 COMPLETE CBC W/AUTO DIFF WBC: CPT

## 2020-12-11 PROCEDURE — 36415 COLL VENOUS BLD VENIPUNCTURE: CPT

## 2020-12-11 PROCEDURE — 80053 COMPREHEN METABOLIC PANEL: CPT | Performed by: INTERNAL MEDICINE

## 2020-12-11 PROCEDURE — 99243 OFF/OP CNSLTJ NEW/EST LOW 30: CPT | Performed by: INTERNAL MEDICINE

## 2020-12-11 NOTE — PROGRESS NOTES
.     REASON FOR CONSULTATION:     Provide an opinion on any further workup or treatment serum protein abnormal kappa/lambda ratio and elevated serum protein.                             REQUESTING PHYSICIAN: Joseph Neal M.D.       RECORDS OBTAINED:  Records of the patient's history including those obtained from the referring provider were reviewed and summarized in detail.    HISTORY OF PRESENT ILLNESS:  The patient is a 60 y.o. year old female with history of hyperlipidemia, kidney stones, recurrent urinary tract infection, Sjogren disease, psoriasis and stage 3 chronic renal insufficiency who presented today for initial evaluation referred by her nephrologist, Dr. Neal, for evaluation because of abnormal laboratory study reported elevated kappa/lambda ratio and elevated serum protein. The lab study actually was from 11/14/2019, the patient had total serum protein 8.9, albumin 4.0, globulin 4.9 g/dL.  Her serum protein electrophoresis reported negative for monoclonal spike. She had elevated serum free kappa chain 79.1 mg/L, free lambda chain 39.6 mg/L and kappa/lambda ratio 2.01. There was no serum protein immunofixation, no quantification of immunoglobulins for IgG IgM and IgA subtypes.      Laboratory studies today on 12/11/2020 reported normal CBC including hemoglobin 13.8, platelets 207,000 and WBC 6210 including ANC 3190, lymphocytes 1730, monocytes 930. Her labs recently on 11/04/2020 also reported normal CBC and BMP showed elevated creatinine 1.28 but otherwise normal calcium 9.8. Normal electrolytes.     Patient reports occasional fever.  Chronic joint pains.  Dry mouth.  Pruritus.  Heartburns.  Hematuria related to kidney stone in the urine tract infection.  Has increased nocturia.  Reports easy bruising, and also gum bleeding.  Has sinus problem.     Past Medical History:   Diagnosis Date   • Anesthesia     PT STATES SENSITIVE TO AND B/P DROPS   • Arthritis    • Bruises easily    •  "CKD (chronic kidney disease)     Stage 3   • Depression    • Emphysema lung (CMS/HCC)     WAS TOLD SHE HAD THIS PER CT SCAN MAY 2018.... NO INHALERS   • Fibroids    • GERD (gastroesophageal reflux disease)    • H/O menorrhagia    • Hiatal hernia    • Hyperlipidemia    • Kidney calculi    • Kidney stone    • Recurrent UTI    • Right knee pain     \"BONE ON BONE\"   • Sjogren's disease (CMS/HCC)    Patient reports that she was diagnosed with Sjogren's disease about 5 years ago, has chronic dry mouth.  She also has psoriasis.  She has no treatment for neither of them.  She only currently have very small skin rash on her right elbow.      Past Surgical History:   Procedure Laterality Date   • BREAST FIBROADENOMA SURGERY Left     4 cysts removed-all benign   • BREAST SURGERY     •  SECTION      x3   • CHOLECYSTECTOMY     • CYSTOSCOPY N/A 2016    Procedure: CYSTOSCOPY;  Surgeon: Mikey Aguirre MD;  Location: [facility];  Service:    • HYSTERECTOMY      Dr. Foster-menorrhagia   • KIDNEY STONE SURGERY     • URETEROSCOPY LASER LITHOTRIPSY WITH STENT INSERTION Left 2016    Procedure: lt URETEROSCOPY LASER LITHOTRIPSY WITH stone basket extraction w/STENT INSERTION;  Surgeon: Hasmukh Krueger MD;  Location: [facility];  Service:    • URETEROSCOPY LASER LITHOTRIPSY WITH STENT INSERTION Left 3/1/2019    Procedure: LEFT URETEROSCOPY LASER LITHOTRIPSY STONE BASKET EXTRACTION STENT;  Surgeon: Hasmukh Krueger MD;  Location: [facility];  Service: Urology   • URETEROSCOPY LASER LITHOTRIPSY WITH STENT INSERTION Left 3/15/2019    Procedure: LEFT URETEROSCOPY LASER LITHOTRIPSY WITH STENT INSERTION STONE BASKET EXTRACTION;  Surgeon: Hasmukh Krueger MD;  Location: [facility];  Service: Urology   • WISDOM TOOTH EXTRACTION     History of  3 times, cholecystectomy .  Hysterectomy .    MEDICATIONS    Current Outpatient Medications:   •  atorvastatin (LIPITOR) 10 MG tablet, Take " 10 mg by mouth Daily., Disp: , Rfl:   •  loratadine (CLARITIN) 10 MG tablet, Take 10 mg by mouth As Needed., Disp: , Rfl:   •  omeprazole (priLOSEC) 20 MG capsule, Take 20 mg by mouth As Needed., Disp: , Rfl:   •  POTASSIUM CITRATE-CITRIC ACID PO, Take 2,160 mEq by mouth 2 (Two) Times a Day., Disp: , Rfl:     ALLERGIES:     Allergies   Allergen Reactions   • Morphine Swelling     FELT LIKE THROAT SWELLED, SOME SWALLOWING TROUBLE   • Nickel Swelling   • Morphine And Related Swelling     FELT LIKE THROAT SWELLED, SOME SWALLOWING TROUBLE  FELT LIKE THROAT SWELLED, SOME SWALLOWING TROUBLE         SOCIAL HISTORY:       Social History     Socioeconomic History   • Marital status:      Spouse name: Pratik   • Number of children: 3   • Years of education: Not on file   • Highest education level: Not on file   Occupational History     Employer: RETIRED   Tobacco Use   • Smoking status: Former Smoker     Packs/day: 0.50     Years: 43.00     Pack years: 21.50     Types: Cigarettes     Quit date: 2016     Years since quittin.3   • Smokeless tobacco: Never Used   • Tobacco comment: QUIT 2018   Substance and Sexual Activity   • Alcohol use: No   • Drug use: No   • Sexual activity: Defer     Partners: Female   Patient is a social drinker.  Less than 1/week.  Former smoker, quit in 2017.  No illegal drug use.      FAMILY HISTORY:  Family History   Problem Relation Age of Onset   • Heart disease Mother    • Cancer Father    • Hodgkin's lymphoma Father    • Heart disease Maternal Grandmother    • Heart disease Maternal Grandfather    • Heart disease Paternal Grandmother    • Heart disease Paternal Grandfather    • Malig Hyperthermia Neg Hx    · Father  of Hodgkin's disease at age of 30.  No details.    · Mother has arthritis, osteoporosis, hypertension, hyperlipidemia and heart disease.    · Paternal grandparents had kidney disease no details.      REVIEW OF SYSTEMS:  Review of Systems  "  Constitutional: Positive for fever (Occasional, not current). Negative for activity change, appetite change, diaphoresis, fatigue and unexpected weight change.   HENT: Positive for congestion and sinus pressure. Negative for facial swelling, mouth sores and nosebleeds.         Dry mouth secondary to Sjogren's   Eyes: Negative for photophobia and visual disturbance.   Respiratory: Negative for cough and shortness of breath.    Gastrointestinal: Negative for abdominal pain, anal bleeding, blood in stool, constipation, diarrhea and nausea.        Heartburns   Endocrine: Negative for cold intolerance and heat intolerance.   Genitourinary: Positive for hematuria (Frequently secondary to UTI and kidney stone) and urgency (Nocturia).   Musculoskeletal: Positive for arthralgias and back pain. Negative for joint swelling.   Skin: Negative for color change and rash.        Pruritus   Allergic/Immunologic: Negative for immunocompromised state.   Neurological: Negative for dizziness, numbness and headaches.   Hematological: Negative for adenopathy. Bruises/bleeds easily.   Psychiatric/Behavioral: Negative for agitation and confusion.              Vitals:    12/11/20 0913   BP: 149/81   Pulse: 69   Resp: 16   Temp: 96.5 °F (35.8 °C)   SpO2: 96%   Weight: 88.5 kg (195 lb 3.2 oz)   Height: 171 cm (67.32\")   PainSc: 0-No pain     Current Status 12/11/2020   ECOG score 0      PHYSICAL EXAM:      CONSTITUTIONAL:  Vital signs reviewed.  Well-developed well-nourished  female.  No distress, looks comfortable.  EYES:  Conjunctiva and lids unremarkable.  PERRLA  EARS,NOSE,MOUTH,THROAT:  Ears appear unremarkable.  Patient wears mask due to the pandemic coronavirus infection.   RESPIRATORY:  Normal respiratory effort.  Lungs clear to auscultation bilaterally.  CARDIOVASCULAR: Regular rhythm and rate.  Normal S1, S2.  No murmurs.  No significant lower extremity edema.  GASTROINTESTINAL: Abdomen appears unremarkable.  Nontender.  " No hepatomegaly.  No splenomegaly.  Bowel sounds normal.  LYMPHATIC:  No cervical, supraclavicular, axillary or inguinal lymphadenopathy.  MUSCULOSKELETAL:  Unremarkable gait and station.  Unremarkable digits/nails.  No cyanosis or clubbing.  SKIN:  Warm.  No rashes.  PSYCHIATRIC:  Normal judgment and insight.  Normal mood and affect.      RECENT LABS:  WBC   Date Value Ref Range Status   12/11/2020 6.21 3.40 - 10.80 10*3/mm3 Final   08/30/2019 5.84 4.5 - 11.0 10*3/uL Final     RBC   Date Value Ref Range Status   12/11/2020 4.43 3.77 - 5.28 10*6/mm3 Final   08/30/2019 4.54 4.0 - 5.2 10*6/uL Final     Hemoglobin   Date Value Ref Range Status   12/11/2020 13.8 12.0 - 15.9 g/dL Final   08/30/2019 14.0 12.0 - 16.0 g/dL Final     Hematocrit   Date Value Ref Range Status   12/11/2020 41.0 34.0 - 46.6 % Final   08/30/2019 41.9 36.0 - 46.0 % Final     MCV   Date Value Ref Range Status   12/11/2020 92.6 79.0 - 97.0 fL Final   08/30/2019 92.3 80.0 - 100.0 fL Final     MCH   Date Value Ref Range Status   12/11/2020 31.2 26.6 - 33.0 pg Final   08/30/2019 30.8 26.0 - 34.0 pg Final     MCHC   Date Value Ref Range Status   12/11/2020 33.7 31.5 - 35.7 g/dL Final   08/30/2019 33.4 31.0 - 37.0 g/dL Final     RDW   Date Value Ref Range Status   12/11/2020 13.1 12.3 - 15.4 % Final   08/30/2019 13.5 12.0 - 16.8 % Final     RDW-SD   Date Value Ref Range Status   12/11/2020 44.2 37.0 - 54.0 fl Final     MPV   Date Value Ref Range Status   12/11/2020 10.3 6.0 - 12.0 fL Final   08/30/2019 11.6 (H) 6.7 - 10.8 fL Final     Platelets   Date Value Ref Range Status   12/11/2020 207 140 - 450 10*3/mm3 Final   08/30/2019 261 140 - 440 10*3/uL Final     Neutrophil Rel %   Date Value Ref Range Status   08/30/2019 46.3 45 - 80 % Final     Neutrophil %   Date Value Ref Range Status   12/11/2020 51.3 42.7 - 76.0 % Final     Lymphocyte Rel %   Date Value Ref Range Status   08/30/2019 31.7 15 - 50 % Final     Lymphocyte %   Date Value Ref Range Status    12/11/2020 27.9 19.6 - 45.3 % Final     Monocyte Rel %   Date Value Ref Range Status   08/30/2019 16.1 (H) 0 - 15 % Final     Monocyte %   Date Value Ref Range Status   12/11/2020 15.0 (H) 5.0 - 12.0 % Final     Eosinophil %   Date Value Ref Range Status   12/11/2020 4.2 0.3 - 6.2 % Final   08/30/2019 4.8 0 - 7 % Final     Basophil Rel %   Date Value Ref Range Status   08/30/2019 0.9 0 - 2 % Final     Basophil %   Date Value Ref Range Status   12/11/2020 1.1 0.0 - 1.5 % Final     Immature Grans %   Date Value Ref Range Status   12/11/2020 0.5 0.0 - 0.5 % Final   08/30/2019 0.2 (H) 0 % Final     Neutrophils Absolute   Date Value Ref Range Status   08/30/2019 2.71 2.0 - 8.8 10*3/uL Final     Neutrophils, Absolute   Date Value Ref Range Status   12/11/2020 3.19 1.70 - 7.00 10*3/mm3 Final     Lymphocytes Absolute   Date Value Ref Range Status   08/30/2019 1.85 0.7 - 5.5 10*3/uL Final     Lymphocytes, Absolute   Date Value Ref Range Status   12/11/2020 1.73 0.70 - 3.10 10*3/mm3 Final     Monocytes Absolute   Date Value Ref Range Status   08/30/2019 0.94 0.0 - 1.7 10*3/uL Final     Monocytes, Absolute   Date Value Ref Range Status   12/11/2020 0.93 (H) 0.10 - 0.90 10*3/mm3 Final     Eosinophils Absolute   Date Value Ref Range Status   08/30/2019 0.28 0.0 - 0.8 10*3/uL Final     Eosinophils, Absolute   Date Value Ref Range Status   12/11/2020 0.26 0.00 - 0.40 10*3/mm3 Final     Basophils Absolute   Date Value Ref Range Status   08/30/2019 0.05 0.0 - 0.2 10*3/uL Final     Basophils, Absolute   Date Value Ref Range Status   12/11/2020 0.07 0.00 - 0.20 10*3/mm3 Final     Immature Grans, Absolute   Date Value Ref Range Status   12/11/2020 0.03 0.00 - 0.05 10*3/mm3 Final   08/30/2019 0.01 <1 10*3/uL Final     nRBC   Date Value Ref Range Status   12/11/2020 0.0 0.0 - 0.2 /100 WBC Final         Assessment/Plan   Abnormal serum total protein in the elevated kappa/lambda ratio, discovered in November 2019.  I discussed with the  patient today on 12/11/2020 that the laboratory study from 11/14/2019 was not complete. I will obtain further investigation for comprehensive serum paraprotein studies including serum protein immunofixation, quantification of immunoglobulins, free light chains and serum protein electrophoresis. We will also obtain beta-2 microglobulin as well as CMP. If she has positive monoclonal protein then we will further obtain skeletal survey plus 24-hour urine study for assessment. Patient voiced understanding.     PLAN:   1. Laboratory studies today as mentioned.   - MICHELLE,PE and FLC, Serum  - Beta 2 Microglobulin, Serum  - Comprehensive Metabolic Panel  2. We will bring patient back for reevaluation in 3-4 weeks. We might add more studies pending the current study results. She voiced understanding.        KAYCE JULIAN M.D., Ph.D.    12/11/2020.      CC:  Joseph Neal M.D.   FERMÍN Nunez

## 2020-12-14 LAB
ALBUMIN SERPL ELPH-MCNC: 3.6 G/DL (ref 2.9–4.4)
ALBUMIN/GLOB SERPL: 0.8 {RATIO} (ref 0.7–1.7)
ALPHA1 GLOB SERPL ELPH-MCNC: 0.2 G/DL (ref 0–0.4)
ALPHA2 GLOB SERPL ELPH-MCNC: 1.2 G/DL (ref 0.4–1)
B-GLOBULIN SERPL ELPH-MCNC: 1.2 G/DL (ref 0.7–1.3)
GAMMA GLOB SERPL ELPH-MCNC: 2.1 G/DL (ref 0.4–1.8)
GLOBULIN SER-MCNC: 4.7 G/DL (ref 2.2–3.9)
IGA SERPL-MCNC: 179 MG/DL (ref 87–352)
IGG SERPL-MCNC: 1959 MG/DL (ref 586–1602)
IGM SERPL-MCNC: 221 MG/DL (ref 26–217)
INTERPRETATION SERPL IEP-IMP: ABNORMAL
KAPPA LC FREE SER-MCNC: 65.4 MG/L (ref 3.3–19.4)
KAPPA LC FREE/LAMBDA FREE SER: 1.87 {RATIO} (ref 0.26–1.65)
LABORATORY COMMENT REPORT: ABNORMAL
LAMBDA LC FREE SERPL-MCNC: 34.9 MG/L (ref 5.7–26.3)
M PROTEIN SERPL ELPH-MCNC: ABNORMAL G/DL
PROT SERPL-MCNC: 8.3 G/DL (ref 6–8.5)

## 2021-01-06 ENCOUNTER — TELEMEDICINE (OUTPATIENT)
Dept: ONCOLOGY | Facility: CLINIC | Age: 61
End: 2021-01-06

## 2021-01-06 DIAGNOSIS — R79.9 ABNORMAL SERUM TOTAL PROTEIN LEVEL: Primary | ICD-10-CM

## 2021-01-06 PROCEDURE — 99441 PR PHYS/QHP TELEPHONE EVALUATION 5-10 MIN: CPT | Performed by: INTERNAL MEDICINE

## 2021-01-06 NOTE — PROGRESS NOTES
.     REASON FOR  FOLLOWUP:     Serum protein abnormal kappa/lambda ratio and elevated serum protein.  Laboratory study on 12/11/2020 reported no evidence for monoclonal serum protein.                                 HISTORY OF PRESENT ILLNESS:  The patient is a 60 y.o. year old female with history of hyperlipidemia, kidney stones, recurrent urinary tract infection, Sjogren disease, psoriasis and stage 3 chronic renal insufficiency who presented today on 1/6/2021 for telemedicine evaluation to discuss the results of laboratory studies obtained on 12/11/2020.  This was originally set up as video conference, however due to technical problem patient was not able to connect, so we speak on the telephone instead.    This patient presented for initial evaluation on 12/11/2020 for abnormal laboratory studies with elevated serum protein and elevated kappa/lambda ratio, referred by her nephrologist, Dr. Neal, for evaluation because of abnormal laboratory study reported elevated kappa/lambda ratio and elevated serum protein.     The lab study actually was from 11/14/2019, the patient had total serum protein 8.9, albumin 4.0, globulin 4.9 g/dL.  Her serum protein electrophoresis reported negative for monoclonal spike. She had elevated serum free kappa chain 79.1 mg/L, free lambda chain 39.6 mg/L and kappa/lambda ratio 2.01. There was no serum protein immunofixation, no quantification of immunoglobulins for IgG IgM and IgA subtypes.      Laboratory studies on 12/11/2020 reported normal CBC including hemoglobin 13.8, platelets 207,000 and WBC 6210 including ANC 3190, lymphocytes 1730, monocytes 930. Her labs recently on 11/04/2020 also reported normal CBC and BMP showed elevated creatinine 1.28 but otherwise normal calcium 9.8. Normal electrolytes.  Chemistry lab reported total protein 8.6, albumin 4.3, globulin 4.3, normal liver function panel, creatinine 1.17, normal electrolytes and glucose.     We obtained further  "laboratory studies 2020, and reported negative for monoclonal protein by serum protein immunofixation as well as by serum protein electrophoresis, despite having elevated globulin 4.7 g/dL including alpha-2 globulin and gammaglobulin, with a quantitation of immunoglobin reporting elevated serum IgG at 1959 mg/dL, and a marginally elevated IgM 221, and a normal IgA 179.  The free kappa chain was also elevated at 65.4 mg/L, and the free lambda chain 34.9 mg/L, with a kappa/lambda ratio 1.87, and also elevated beta-2 microglobulin 4.7 mg/L.      Today patient reports occasional fever.  Chronic joint pains.  Dry mouth.  Pruritus.  Heartburns.  Hematuria related to kidney stone in the urine tract infection.  Has increased nocturia.  Reports easy bruising, and also gum bleeding.  Has sinus problem.     Past Medical History:   Diagnosis Date   • Anesthesia     PT STATES SENSITIVE TO AND B/P DROPS   • Arthritis    • Bruises easily    • CKD (chronic kidney disease)     Stage 3   • Depression    • Emphysema lung (CMS/HCC)     WAS TOLD SHE HAD THIS PER CT SCAN MAY 2018.... NO INHALERS   • Fibroids    • GERD (gastroesophageal reflux disease)    • H/O menorrhagia    • Hiatal hernia    • Hyperlipidemia    • Kidney calculi    • Kidney stone    • Recurrent UTI    • Right knee pain     \"BONE ON BONE\"   • Sjogren's disease (CMS/HCC)    Patient reports that she was diagnosed with Sjogren's disease about 5 years ago, has chronic dry mouth.  She also has psoriasis.  She has no treatment for neither of them.  She only currently have very small skin rash on her right elbow.      Past Surgical History:   Procedure Laterality Date   • BREAST FIBROADENOMA SURGERY Left     4 cysts removed-all benign   • BREAST SURGERY     •  SECTION      x3   • CHOLECYSTECTOMY  2007   • COLONOSCOPY  2018   • CYSTOSCOPY N/A 2016    Procedure: CYSTOSCOPY;  Surgeon: Mikey Aguirre MD;  Location: [facility];  Service:    • HYSTERECTOMY  "     Dr. Foster-menorrhagia   • KIDNEY STONE SURGERY     • URETEROSCOPY LASER LITHOTRIPSY WITH STENT INSERTION Left 2016    Procedure: lt URETEROSCOPY LASER LITHOTRIPSY WITH stone basket extraction w/STENT INSERTION;  Surgeon: Hasmukh Krueger MD;  Location: [facility] OR;  Service:    • URETEROSCOPY LASER LITHOTRIPSY WITH STENT INSERTION Left 3/1/2019    Procedure: LEFT URETEROSCOPY LASER LITHOTRIPSY STONE BASKET EXTRACTION STENT;  Surgeon: Hasmukh Krueger MD;  Location: [facility] OR;  Service: Urology   • URETEROSCOPY LASER LITHOTRIPSY WITH STENT INSERTION Left 3/15/2019    Procedure: LEFT URETEROSCOPY LASER LITHOTRIPSY WITH STENT INSERTION STONE BASKET EXTRACTION;  Surgeon: Hasmukh Krueger MD;  Location: [facility] OR;  Service: Urology   • WISDOM TOOTH EXTRACTION     History of  3 times, cholecystectomy .  Hysterectomy .    HEMATOLOGY HISTORY: See current history.     MEDICATIONS    Current Outpatient Medications:   •  atorvastatin (LIPITOR) 10 MG tablet, Take 10 mg by mouth Daily., Disp: , Rfl:   •  loratadine (CLARITIN) 10 MG tablet, Take 10 mg by mouth As Needed., Disp: , Rfl:   •  omeprazole (priLOSEC) 20 MG capsule, Take 20 mg by mouth As Needed., Disp: , Rfl:   •  POTASSIUM CITRATE-CITRIC ACID PO, Take 2,160 mEq by mouth 2 (Two) Times a Day., Disp: , Rfl:     ALLERGIES:     Allergies   Allergen Reactions   • Morphine Swelling     FELT LIKE THROAT SWELLED, SOME SWALLOWING TROUBLE   • Nickel Swelling   • Morphine And Related Swelling     FELT LIKE THROAT SWELLED, SOME SWALLOWING TROUBLE  FELT LIKE THROAT SWELLED, SOME SWALLOWING TROUBLE         SOCIAL HISTORY:       Social History     Socioeconomic History   • Marital status:      Spouse name: Pratik   • Number of children: 3   • Years of education: Not on file   • Highest education level: Not on file   Occupational History     Employer: RETIRED     Comment: [employer]   Tobacco Use   • Smoking status: Former  Smoker     Packs/day: 0.50     Years: 43.00     Pack years: 21.50     Types: Cigarettes     Quit date: 2016     Years since quittin.3   • Smokeless tobacco: Never Used   • Tobacco comment: QUIT 2018   Substance and Sexual Activity   • Alcohol use: No   • Drug use: No   • Sexual activity: Defer     Partners: Female   Patient is a social drinker.  Less than 1/week.  Former smoker, quit in 2017.  No illegal drug use.      FAMILY HISTORY:  Family History   Problem Relation Age of Onset   • Heart disease Mother    • Arthritis Mother    • Osteoporosis Mother    • Hypertension Mother    • Hyperlipidemia Mother    • Cancer Father    • Hodgkin's lymphoma Father    • Heart disease Maternal Grandmother    • Heart disease Maternal Grandfather    • Heart disease Paternal Grandmother    • Kidney disease Paternal Grandmother    • Heart disease Paternal Grandfather    • Kidney disease Paternal Grandfather    • Malig Hyperthermia Neg Hx    · Father  of Hodgkin's disease at age of 30.  No details.    · Mother has arthritis, osteoporosis, hypertension, hyperlipidemia and heart disease.    · Paternal grandparents had kidney disease no details.      REVIEW OF SYSTEMS:  Review of Systems   Constitutional: Positive for fever (Occasional, not current). Negative for activity change, appetite change, diaphoresis, fatigue and unexpected weight change.   HENT: Positive for congestion and sinus pressure. Negative for facial swelling, mouth sores and nosebleeds.         Dry mouth secondary to Sjogren's   Eyes: Negative for photophobia and visual disturbance.   Respiratory: Negative for cough and shortness of breath.    Gastrointestinal: Negative for abdominal pain, anal bleeding, blood in stool, constipation, diarrhea and nausea.        Heartburns   Endocrine: Negative for cold intolerance and heat intolerance.   Genitourinary: Positive for hematuria (Frequently secondary to UTI and kidney stone  ) and urgency  (Nocturia).   Musculoskeletal: Positive for arthralgias and back pain. Negative for joint swelling.   Skin: Negative for color change and rash.        Pruritus   Allergic/Immunologic: Negative for immunocompromised state.   Neurological: Negative for dizziness, numbness and headaches.   Hematological: Negative for adenopathy. Bruises/bleeds easily.   Psychiatric/Behavioral: Negative for agitation and confusion.   No change of review of system on 1/6/2021 compared to that from 12/11/2020           There were no vitals filed for this visit.  Current Status 12/11/2020   ECOG score 0      PHYSICAL EXAM: This is a telemedicine through telephone, no physical examination.      RECENT LABS:  WBC   Date Value Ref Range Status   12/11/2020 6.21 3.40 - 10.80 10*3/mm3 Final   08/30/2019 5.84 4.5 - 11.0 10*3/uL Final     RBC   Date Value Ref Range Status   12/11/2020 4.43 3.77 - 5.28 10*6/mm3 Final   08/30/2019 4.54 4.0 - 5.2 10*6/uL Final     Hemoglobin   Date Value Ref Range Status   12/11/2020 13.8 12.0 - 15.9 g/dL Final   08/30/2019 14.0 12.0 - 16.0 g/dL Final     Hematocrit   Date Value Ref Range Status   12/11/2020 41.0 34.0 - 46.6 % Final   08/30/2019 41.9 36.0 - 46.0 % Final     MCV   Date Value Ref Range Status   12/11/2020 92.6 79.0 - 97.0 fL Final   08/30/2019 92.3 80.0 - 100.0 fL Final     MCH   Date Value Ref Range Status   12/11/2020 31.2 26.6 - 33.0 pg Final   08/30/2019 30.8 26.0 - 34.0 pg Final     MCHC   Date Value Ref Range Status   12/11/2020 33.7 31.5 - 35.7 g/dL Final   08/30/2019 33.4 31.0 - 37.0 g/dL Final     RDW   Date Value Ref Range Status   12/11/2020 13.1 12.3 - 15.4 % Final   08/30/2019 13.5 12.0 - 16.8 % Final     RDW-SD   Date Value Ref Range Status   12/11/2020 44.2 37.0 - 54.0 fl Final     MPV   Date Value Ref Range Status   12/11/2020 10.3 6.0 - 12.0 fL Final   08/30/2019 11.6 (H) 6.7 - 10.8 fL Final     Platelets   Date Value Ref Range Status   12/11/2020 207 140 - 450 10*3/mm3 Final    08/30/2019 261 140 - 440 10*3/uL Final     Neutrophil Rel %   Date Value Ref Range Status   08/30/2019 46.3 45 - 80 % Final     Neutrophil %   Date Value Ref Range Status   12/11/2020 51.3 42.7 - 76.0 % Final     Lymphocyte Rel %   Date Value Ref Range Status   08/30/2019 31.7 15 - 50 % Final     Lymphocyte %   Date Value Ref Range Status   12/11/2020 27.9 19.6 - 45.3 % Final     Monocyte Rel %   Date Value Ref Range Status   08/30/2019 16.1 (H) 0 - 15 % Final     Monocyte %   Date Value Ref Range Status   12/11/2020 15.0 (H) 5.0 - 12.0 % Final     Eosinophil %   Date Value Ref Range Status   12/11/2020 4.2 0.3 - 6.2 % Final   08/30/2019 4.8 0 - 7 % Final     Basophil Rel %   Date Value Ref Range Status   08/30/2019 0.9 0 - 2 % Final     Basophil %   Date Value Ref Range Status   12/11/2020 1.1 0.0 - 1.5 % Final     Immature Grans %   Date Value Ref Range Status   12/11/2020 0.5 0.0 - 0.5 % Final   08/30/2019 0.2 (H) 0 % Final     Neutrophils Absolute   Date Value Ref Range Status   08/30/2019 2.71 2.0 - 8.8 10*3/uL Final     Neutrophils, Absolute   Date Value Ref Range Status   12/11/2020 3.19 1.70 - 7.00 10*3/mm3 Final     Lymphocytes Absolute   Date Value Ref Range Status   08/30/2019 1.85 0.7 - 5.5 10*3/uL Final     Lymphocytes, Absolute   Date Value Ref Range Status   12/11/2020 1.73 0.70 - 3.10 10*3/mm3 Final     Monocytes Absolute   Date Value Ref Range Status   08/30/2019 0.94 0.0 - 1.7 10*3/uL Final     Monocytes, Absolute   Date Value Ref Range Status   12/11/2020 0.93 (H) 0.10 - 0.90 10*3/mm3 Final     Eosinophils Absolute   Date Value Ref Range Status   08/30/2019 0.28 0.0 - 0.8 10*3/uL Final     Eosinophils, Absolute   Date Value Ref Range Status   12/11/2020 0.26 0.00 - 0.40 10*3/mm3 Final     Basophils Absolute   Date Value Ref Range Status   08/30/2019 0.05 0.0 - 0.2 10*3/uL Final     Basophils, Absolute   Date Value Ref Range Status   12/11/2020 0.07 0.00 - 0.20 10*3/mm3 Final     Immature Grans,  Absolute   Date Value Ref Range Status   12/11/2020 0.03 0.00 - 0.05 10*3/mm3 Final   08/30/2019 0.01 <1 10*3/uL Final     nRBC   Date Value Ref Range Status   12/11/2020 0.0 0.0 - 0.2 /100 WBC Final         Assessment/Plan   Abnormal serum total protein in the elevated kappa/lambda ratio, discovered in November 2019.  I discussed with the patient today on 12/11/2020 that the laboratory study from 11/14/2019 was not complete. I will obtain further investigation for comprehensive serum paraprotein studies including serum protein immunofixation, quantification of immunoglobulins, free light chains and serum protein electrophoresis. We will also obtain beta-2 microglobulin as well as CMP. If she has positive monoclonal protein then we will further obtain skeletal survey plus 24-hour urine study for assessment. Patient voiced understanding.     PLAN:   1.  Follow-up with me in 1 year, repeat laboratory studies 2 weeks prior to MD visit, including    - MICHELLE,PE and FLC, Serum  - Beta 2 Microglobulin, Serum  - Comprehensive Metabolic Panel and CBC      You have chosen to receive care through a telephone visit today. Do you consent to use a telephone visit for your medical care today? Yes     This visit has been rescheduled as a phone visit to comply with patient safety concerns in accordance with CDC recommendations. Total time of discussion was 7 minutes.        KAYCE JULIAN M.D., Ph.D.    1/6/2021.      CC:  Joseph Neal M.D.   Sigrid Vann, FERMÍN

## 2021-01-12 ENCOUNTER — TELEPHONE (OUTPATIENT)
Dept: ONCOLOGY | Facility: CLINIC | Age: 61
End: 2021-01-12

## 2021-04-24 ENCOUNTER — APPOINTMENT (OUTPATIENT)
Dept: CT IMAGING | Facility: HOSPITAL | Age: 61
End: 2021-04-24

## 2021-04-24 ENCOUNTER — HOSPITAL ENCOUNTER (EMERGENCY)
Facility: HOSPITAL | Age: 61
Discharge: HOME | End: 2021-04-24
Attending: EMERGENCY MEDICINE | Admitting: EMERGENCY MEDICINE

## 2021-04-24 VITALS
HEART RATE: 70 BPM | BODY MASS INDEX: 28.79 KG/M2 | OXYGEN SATURATION: 94 % | WEIGHT: 190 LBS | HEIGHT: 68 IN | TEMPERATURE: 97 F | DIASTOLIC BLOOD PRESSURE: 64 MMHG | SYSTOLIC BLOOD PRESSURE: 126 MMHG | RESPIRATION RATE: 20 BRPM

## 2021-04-24 DIAGNOSIS — N20.0 RIGHT KIDNEY STONE: ICD-10-CM

## 2021-04-24 DIAGNOSIS — N39.0 URINARY TRACT INFECTION IN FEMALE: Primary | ICD-10-CM

## 2021-04-24 LAB
BACTERIA UR QL AUTO: ABNORMAL /HPF
BILIRUB UR QL STRIP: NEGATIVE
CLARITY UR: CLEAR
COLOR UR: YELLOW
GLUCOSE UR STRIP-MCNC: NEGATIVE MG/DL
HGB UR QL STRIP.AUTO: ABNORMAL
HYALINE CASTS UR QL AUTO: ABNORMAL /LPF
KETONES UR QL STRIP: NEGATIVE
LEUKOCYTE ESTERASE UR QL STRIP.AUTO: ABNORMAL
NITRITE UR QL STRIP: NEGATIVE
PH UR STRIP.AUTO: 7 [PH] (ref 4.5–8)
PROT UR QL STRIP: ABNORMAL
RBC # UR: ABNORMAL /HPF
REF LAB TEST METHOD: ABNORMAL
SP GR UR STRIP: 1.02 (ref 1–1.03)
SQUAMOUS #/AREA URNS HPF: ABNORMAL /HPF
UROBILINOGEN UR QL STRIP: ABNORMAL
WBC UR QL AUTO: ABNORMAL /HPF

## 2021-04-24 PROCEDURE — 99284 EMERGENCY DEPT VISIT MOD MDM: CPT | Performed by: EMERGENCY MEDICINE

## 2021-04-24 PROCEDURE — 87186 SC STD MICRODIL/AGAR DIL: CPT | Performed by: EMERGENCY MEDICINE

## 2021-04-24 PROCEDURE — 63710000001 ONDANSETRON ODT 4 MG TABLET DISPERSIBLE: Performed by: EMERGENCY MEDICINE

## 2021-04-24 PROCEDURE — 87086 URINE CULTURE/COLONY COUNT: CPT | Performed by: EMERGENCY MEDICINE

## 2021-04-24 PROCEDURE — 99283 EMERGENCY DEPT VISIT LOW MDM: CPT

## 2021-04-24 PROCEDURE — 87077 CULTURE AEROBIC IDENTIFY: CPT | Performed by: EMERGENCY MEDICINE

## 2021-04-24 PROCEDURE — 81001 URINALYSIS AUTO W/SCOPE: CPT

## 2021-04-24 PROCEDURE — 74176 CT ABD & PELVIS W/O CONTRAST: CPT

## 2021-04-24 RX ORDER — CEFUROXIME AXETIL 250 MG/1
500 TABLET ORAL ONCE
Status: COMPLETED | OUTPATIENT
Start: 2021-04-24 | End: 2021-04-24

## 2021-04-24 RX ORDER — HYDROCODONE BITARTRATE AND ACETAMINOPHEN 5; 325 MG/1; MG/1
1-2 TABLET ORAL EVERY 4 HOURS PRN
Qty: 10 TABLET | Refills: 0 | Status: SHIPPED | OUTPATIENT
Start: 2021-04-24 | End: 2021-07-14 | Stop reason: ALTCHOICE

## 2021-04-24 RX ORDER — ONDANSETRON 4 MG/1
8 TABLET, ORALLY DISINTEGRATING ORAL ONCE
Status: COMPLETED | OUTPATIENT
Start: 2021-04-24 | End: 2021-04-24

## 2021-04-24 RX ORDER — HYDROCODONE BITARTRATE AND ACETAMINOPHEN 5; 325 MG/1; MG/1
2 TABLET ORAL ONCE
Status: COMPLETED | OUTPATIENT
Start: 2021-04-24 | End: 2021-04-24

## 2021-04-24 RX ORDER — ONDANSETRON 8 MG/1
TABLET, ORALLY DISINTEGRATING ORAL
Qty: 10 TABLET | Refills: 0 | Status: SHIPPED | OUTPATIENT
Start: 2021-04-24 | End: 2021-07-14 | Stop reason: ALTCHOICE

## 2021-04-24 RX ORDER — CEFUROXIME AXETIL 500 MG/1
500 TABLET ORAL 2 TIMES DAILY
Qty: 20 TABLET | Refills: 0 | Status: SHIPPED | OUTPATIENT
Start: 2021-04-24 | End: 2021-05-04

## 2021-04-24 RX ADMIN — CEFUROXIME AXETIL 500 MG: 250 TABLET ORAL at 22:25

## 2021-04-24 RX ADMIN — HYDROCODONE BITARTRATE AND ACETAMINOPHEN 2 TABLET: 5; 325 TABLET ORAL at 22:24

## 2021-04-24 RX ADMIN — ONDANSETRON 8 MG: 4 TABLET, ORALLY DISINTEGRATING ORAL at 22:24

## 2021-04-25 NOTE — ED PROVIDER NOTES
Subjective     History provided by:  Patient    History of Present Illness    · Chief complaint: Right flank pain    · Location: Right flank radiating around the right side to the right groin.    · Quality/Severity: The pain is moderately severe 7/10 in intensity.    · Timing/Onset: Started this morning and got worse this evening.    · Modifying Factors: No aggravating or relieving factors.    Associated symptoms: Nausea and vomiting.  She reports some discomfort in her urethra when she urinates.  She reports urinary frequency.  Denies hematuria.  · Narrative: The patient is a 60-year-old white female complaining of right flank pain that radiates around the right side to the right groin that started this morning.  She has a history of kidney stones in the past and states this pain is similar.  She has associated nausea and vomiting, but is tolerating p.o.    Review of Systems   Constitutional: Negative for activity change, appetite change, chills, diaphoresis, fatigue and fever.   HENT: Negative for congestion, dental problem, ear pain, hearing loss, mouth sores, postnasal drip, rhinorrhea, sinus pressure, sore throat and voice change.    Eyes: Negative for photophobia, pain, discharge, redness and visual disturbance.   Respiratory: Negative for cough, chest tightness, shortness of breath, wheezing and stridor.    Cardiovascular: Negative for chest pain, palpitations and leg swelling.   Gastrointestinal: Positive for abdominal pain ( Right lower quadrant), nausea and vomiting. Negative for diarrhea.   Genitourinary: Positive for dysuria, flank pain ( Right), frequency and urgency. Negative for difficulty urinating and hematuria.   Musculoskeletal: Negative for arthralgias, back pain, gait problem, joint swelling, myalgias, neck pain and neck stiffness.   Skin: Negative for color change and rash.   Neurological: Negative for dizziness, tremors, seizures, syncope, facial asymmetry, speech difficulty, weakness,  "light-headedness, numbness and headaches.   Hematological: Negative for adenopathy.   Psychiatric/Behavioral: Negative.  Negative for confusion and decreased concentration. The patient is not nervous/anxious.      Past Medical History:   Diagnosis Date   • Anesthesia     PT STATES SENSITIVE TO AND B/P DROPS   • Arthritis    • Bruises easily    • CKD (chronic kidney disease)     Stage 3   • Depression    • Emphysema lung (CMS/HCC)     WAS TOLD SHE HAD THIS PER CT SCAN MAY 2018.... NO INHALERS   • Fibroids    • GERD (gastroesophageal reflux disease)    • H/O menorrhagia    • Hiatal hernia    • Hyperlipidemia    • Kidney calculi    • Kidney stone    • Recurrent UTI    • Right knee pain     \"BONE ON BONE\"   • Sjogren's disease (CMS/HCC)      /64   Pulse 70   Temp 97 °F (36.1 °C) (Temporal)   Resp 20   Ht 172.7 cm (68\")   Wt 86.2 kg (190 lb)   SpO2 94%   BMI 28.89 kg/m²     Past Medical History:   Diagnosis Date   • Anesthesia     PT STATES SENSITIVE TO AND B/P DROPS   • Arthritis    • Bruises easily    • CKD (chronic kidney disease)     Stage 3   • Depression    • Emphysema lung (CMS/HCC)     WAS TOLD SHE HAD THIS PER CT SCAN MAY 2018.... NO INHALERS   • Fibroids    • GERD (gastroesophageal reflux disease)    • H/O menorrhagia    • Hiatal hernia    • Hyperlipidemia    • Kidney calculi    • Kidney stone    • Recurrent UTI    • Right knee pain     \"BONE ON BONE\"   • Sjogren's disease (CMS/HCC)        Allergies   Allergen Reactions   • Morphine Swelling     FELT LIKE THROAT SWELLED, SOME SWALLOWING TROUBLE   • Nickel Swelling   • Morphine And Related Swelling     FELT LIKE THROAT SWELLED, SOME SWALLOWING TROUBLE  FELT LIKE THROAT SWELLED, SOME SWALLOWING TROUBLE         Past Surgical History:   Procedure Laterality Date   • BREAST FIBROADENOMA SURGERY Left     4 cysts removed-all benign   • BREAST SURGERY     •  SECTION      x3   • CHOLECYSTECTOMY  2007   • COLONOSCOPY  2018   • CYSTOSCOPY N/A " 2016    Procedure: CYSTOSCOPY;  Surgeon: Mikey Aguirre MD;  Location: [facility] OR;  Service:    • HYSTERECTOMY  2002    Dr. Foster-menorrhagia   • KIDNEY STONE SURGERY     • URETEROSCOPY LASER LITHOTRIPSY WITH STENT INSERTION Left 2016    Procedure: lt URETEROSCOPY LASER LITHOTRIPSY WITH stone basket extraction w/STENT INSERTION;  Surgeon: Hasmukh Krueger MD;  Location: [facility] MAIN OR;  Service:    • URETEROSCOPY LASER LITHOTRIPSY WITH STENT INSERTION Left 3/1/2019    Procedure: LEFT URETEROSCOPY LASER LITHOTRIPSY STONE BASKET EXTRACTION STENT;  Surgeon: Hasmukh Krueger MD;  Location: [facility] OR;  Service: Urology   • URETEROSCOPY LASER LITHOTRIPSY WITH STENT INSERTION Left 3/15/2019    Procedure: LEFT URETEROSCOPY LASER LITHOTRIPSY WITH STENT INSERTION STONE BASKET EXTRACTION;  Surgeon: Hasmukh Krueger MD;  Location: [facility] OR;  Service: Urology   • WISDOM TOOTH EXTRACTION         Family History   Problem Relation Age of Onset   • Heart disease Mother    • Arthritis Mother    • Osteoporosis Mother    • Hypertension Mother    • Hyperlipidemia Mother    • Cancer Father    • Hodgkin's lymphoma Father    • Heart disease Maternal Grandmother    • Heart disease Maternal Grandfather    • Heart disease Paternal Grandmother    • Kidney disease Paternal Grandmother    • Heart disease Paternal Grandfather    • Kidney disease Paternal Grandfather    • Malig Hyperthermia Neg Hx        Social History     Socioeconomic History   • Marital status:      Spouse name: Pratik   • Number of children: 3   • Years of education: Not on file   • Highest education level: Not on file   Tobacco Use   • Smoking status: Former Smoker     Packs/day: 0.50     Years: 43.00     Pack years: 21.50     Types: Cigarettes     Quit date: 2016     Years since quittin.6   • Smokeless tobacco: Never Used   • Tobacco comment: QUIT 2018   Substance and Sexual Activity   • Alcohol use: No   • Drug use: No    • Sexual activity: Defer     Partners: Female           Objective   Physical Exam  Vitals and nursing note reviewed.   Constitutional:       General: She is not in acute distress.     Appearance: She is well-developed. She is obese. She is not diaphoretic.      Comments: The patient appears in no acute distress and does not appear toxic.  Her complaint of pain is out of proportion to exam.  Review of her vital signs: She is afebrile and all her vital signs are within normal limits.   HENT:      Head: Normocephalic and atraumatic.      Nose: Nose normal.      Mouth/Throat:      Mouth: Mucous membranes are moist.      Pharynx: Oropharynx is clear. No oropharyngeal exudate.   Eyes:      General: No scleral icterus.        Right eye: No discharge.         Left eye: No discharge.      Pupils: Pupils are equal, round, and reactive to light.   Neck:      Thyroid: No thyromegaly.      Vascular: No JVD.   Cardiovascular:      Rate and Rhythm: Normal rate and regular rhythm.      Heart sounds: Normal heart sounds. No murmur heard.     Pulmonary:      Effort: Pulmonary effort is normal.      Breath sounds: Normal breath sounds. No wheezing or rales.   Chest:      Chest wall: No tenderness.   Abdominal:      General: Bowel sounds are normal. There is no distension.      Palpations: Abdomen is soft.      Tenderness: There is no abdominal tenderness. There is no right CVA tenderness, left CVA tenderness, guarding or rebound.   Musculoskeletal:         General: No tenderness or deformity. Normal range of motion.      Cervical back: Normal range of motion and neck supple.   Lymphadenopathy:      Cervical: No cervical adenopathy.   Skin:     General: Skin is warm and dry.      Capillary Refill: Capillary refill takes less than 2 seconds.      Findings: No rash.   Neurological:      General: No focal deficit present.      Mental Status: She is alert and oriented to person, place, and time.      Cranial Nerves: No cranial nerve  deficit.      Coordination: Coordination normal.      Comments: No focal motor sensory deficit   Psychiatric:         Mood and Affect: Mood normal.         Behavior: Behavior normal.         Thought Content: Thought content normal.         Judgment: Judgment normal.         Procedures           ED Course  ED Course as of Apr 24 2340   Sat Apr 24, 2021 2307 The patient CT of the abdomen pelvis without contrast shows bilateral intrarenal stones.  The patient has mild hydronephrosis and mild dilated right ureter, but no stone is seen in the ureter.  The exam is consistent with a possible recently passed kidney stone.  The patient's urinalysis was positive for leukocyte esterases and had 21-30 WBCs with 3-5 red blood cells and trace bacteria consistent with a UTI.    [TP]   2308 Antibiotic therapy for the patient's UTI was initiated with Ceftin 500 mg p.o.  She was administered Vicodin 5 mg #2 p.o. for pain and Zofran ODT 8 mg p.o. for nausea.    [TP]   2313 The patient states when she urinated she felt something passed through her urethra noticed a black speck in the toilet which she believes is a kidney stone.  She states her pain got dramatically better afterwards.    [TP]   2314 The patient will be discharged with a scription for Ceftin 500 mg to be taken for 10 days.  She also prescribed Zofran ODT and a few hydrocodone's.  She is to make an appointment to follow-up with her urologist Dr. Krueger.    [TP]      ED Course User Index  [TP] Richard Ni MD                                           MDM  Number of Diagnoses or Management Options  Right kidney stone: new and requires workup  Urinary tract infection in female: new and requires workup     Amount and/or Complexity of Data Reviewed  Clinical lab tests: ordered and reviewed  Tests in the radiology section of CPT®: ordered and reviewed    Risk of Complications, Morbidity, and/or Mortality  Presenting problems: high  Diagnostic procedures: high  Management  options: high  General comments: My differential diagnosis for back pain includes but is not limited to:  Musculoskeletal strain, contusion, retroperitoneal hematoma, disc protrusion, vertebral fracture, transverse process fracture, rib fracture, facet syndrome, sacroiliac joint strain, sciatica, renal injury, splenic injury, pancreatic injury, osteoarthritis, lumbar spondylosis, spinal stenosis, ankylosing spondylitis, sacroiliac joint inflammation, pancreatitis, perforated peptic ulcer, diverticulitis, endometriosis, chronic PID, epidural abscess, osteomyelitis, retroperitoneal abscess, pyelonephritis, pneumonia, subphrenic abscess, tuberculosis, neurofibroma, meningioma, multiple myeloma, lymphoma, metastatic cancer, primary cancer, AAA, aortic dissection, spinal ischemia, referred pain, ureterolithiasis    Patient Progress  Patient progress: improved      Final diagnoses:   Urinary tract infection in female   Right kidney stone       ED Disposition  ED Disposition     ED Disposition Condition Comment    Discharge Stable           Hasmukh Krueger MD  [address] [address] [phone]    Schedule an appointment as soon as possible for a visit   next available         Medication List      New Prescriptions    cefuroxime 500 MG tablet  Commonly known as: CEFTIN  Take 1 tablet by mouth 2 (Two) Times a Day for 10 days.     HYDROcodone-acetaminophen 5-325 MG per tablet  Commonly known as: NORCO  Take 1-2 tablets by mouth Every 4 (Four) Hours As Needed for Severe Pain .     ondansetron ODT 8 MG disintegrating tablet  Commonly known as: ZOFRAN-ODT  One tablet po q 6 hours PRN nausea and vomiting           Where to Get Your Medications      These medications were sent to [facility] [address] [place], [place] [address] - [address] [address] - [phone] [facility] [phone]   [address] [address]    Phone: [phone]   · cefuroxime 500 MG tablet  · HYDROcodone-acetaminophen 5-325 MG per  tablet  · ondansetron ODT 8 MG disintegrating tablet         Labs Reviewed   URINALYSIS W/ CULTURE IF INDICATED - Abnormal; Notable for the following components:       Result Value    Blood, UA Moderate (2+) (*)     Protein,  mg/dL (2+) (*)     Leuk Esterase, UA Large (3+) (*)     All other components within normal limits   URINALYSIS, MICROSCOPIC ONLY - Abnormal; Notable for the following components:    RBC, UA 3-5 (*)     WBC, UA 21-30 (*)     Bacteria, UA Trace (*)     Squamous Epithelial Cells, UA 3-6 (*)     All other components within normal limits   URINE CULTURE     CT Abdomen Pelvis Without Contrast   Final Result   There are bilateral intrarenal stones      The right kidney shows mild hydronephrosis and the right ureter is dilated down to the bladder but no ureteral stone is visible. Patient may recently passed a stone      Otherwise study is negative               Signer Name: Antelmo Mcmullen MD    Signed: 4/24/2021 10:55 PM    Workstation Name: [email or web address]     [facility]             Medication List      New Prescriptions    cefuroxime 500 MG tablet  Commonly known as: CEFTIN  Take 1 tablet by mouth 2 (Two) Times a Day for 10 days.     HYDROcodone-acetaminophen 5-325 MG per tablet  Commonly known as: NORCO  Take 1-2 tablets by mouth Every 4 (Four) Hours As Needed for Severe Pain .     ondansetron ODT 8 MG disintegrating tablet  Commonly known as: ZOFRAN-ODT  One tablet po q 6 hours PRN nausea and vomiting           Where to Get Your Medications      These medications were sent to [facility] - [address] [facility] - [phone] [facility] [phone]   [address] [address]    Phone: [phone]   · cefuroxime 500 MG tablet  · HYDROcodone-acetaminophen 5-325 MG per tablet  · ondansetron ODT 8 MG disintegrating tablet              Richard Ni MD  04/24/21 3611

## 2021-04-26 LAB — BACTERIA SPEC AEROBE CULT: ABNORMAL

## 2021-07-14 ENCOUNTER — OFFICE VISIT (OUTPATIENT)
Dept: ORTHOPEDIC SURGERY | Facility: CLINIC | Age: 61
End: 2021-07-14

## 2021-07-14 VITALS
SYSTOLIC BLOOD PRESSURE: 115 MMHG | WEIGHT: 190.6 LBS | HEART RATE: 58 BPM | DIASTOLIC BLOOD PRESSURE: 79 MMHG | HEIGHT: 68 IN | BODY MASS INDEX: 28.89 KG/M2

## 2021-07-14 DIAGNOSIS — R52 PAIN: Primary | ICD-10-CM

## 2021-07-14 DIAGNOSIS — M17.11 PRIMARY OSTEOARTHRITIS OF RIGHT KNEE: ICD-10-CM

## 2021-07-14 PROCEDURE — 99204 OFFICE O/P NEW MOD 45 MIN: CPT | Performed by: ORTHOPAEDIC SURGERY

## 2021-07-14 PROCEDURE — 73562 X-RAY EXAM OF KNEE 3: CPT | Performed by: ORTHOPAEDIC SURGERY

## 2021-07-14 RX ORDER — POTASSIUM CITRATE 10 MEQ/1
20 TABLET, EXTENDED RELEASE ORAL EVERY EVENING
COMMUNITY
Start: 2021-05-17

## 2021-07-14 NOTE — PROGRESS NOTES
"Subjective:     Patient ID: Jennifer Arndt is a 60 y.o. female.    Chief Complaint:  Right knee pain, new patient  History of Present Illness  Jennifer Arndt presents to clinic today for evaluation of her right knee. Her pain has been present for approximately 5 years. There was no specific injury. Her pain is moderate in intensity and grinding.  She rates current level of pain is 8-9 out of 10.  She experiences giving away and clicking symptoms which are worse with standing and walking. Symptoms improve with rest, heat, ice and Tylenol. The Tylenol does not provide much relief, if any. The pain radiates down her leg. She has associated tingling but denies numbness. Her last steroid injection was in 2017 and helped for 1 day. Her pain is localized to the medial joint line.   She is employed as a nurse.     Social History     Occupational History     Employer: RETIRED     Comment: [employer]   Tobacco Use   • Smoking status: Former Smoker     Packs/day: 0.50     Years: 43.00     Pack years: 21.50     Types: Cigarettes     Quit date: 2016     Years since quittin.9   • Smokeless tobacco: Never Used   • Tobacco comment: QUIT 2018   Vaping Use   • Vaping Use: Never used   Substance and Sexual Activity   • Alcohol use: No   • Drug use: No   • Sexual activity: Defer     Partners: Female      Past Medical History:   Diagnosis Date   • Anesthesia     PT STATES SENSITIVE TO AND B/P DROPS   • Arthritis    • Bruises easily    • CKD (chronic kidney disease)     Stage 3   • Depression    • Emphysema lung (CMS/HCC)     WAS TOLD SHE HAD THIS PER CT SCAN MAY 2018.... NO INHALERS   • Fibroids    • GERD (gastroesophageal reflux disease)    • H/O menorrhagia    • Hiatal hernia    • Hyperlipidemia    • Kidney calculi    • Kidney stone    • Recurrent UTI    • Right knee pain     \"BONE ON BONE\"   • Sjogren's disease (CMS/HCC)      Past Surgical History:   Procedure Laterality Date   • BREAST FIBROADENOMA " SURGERY Left     4 cysts removed-all benign   • BREAST SURGERY     •  SECTION      x3   • CHOLECYSTECTOMY     • COLONOSCOPY  2018   • CYSTOSCOPY N/A 2016    Procedure: CYSTOSCOPY;  Surgeon: Mikey Aguirre MD;  Location: [facility] OR;  Service:    • HYSTERECTOMY      Dr. Foster-menorrhagia   • KIDNEY STONE SURGERY     • URETEROSCOPY LASER LITHOTRIPSY WITH STENT INSERTION Left 2016    Procedure: lt URETEROSCOPY LASER LITHOTRIPSY WITH stone basket extraction w/STENT INSERTION;  Surgeon: Hasmukh Krueger MD;  Location: [facility] MAIN OR;  Service:    • URETEROSCOPY LASER LITHOTRIPSY WITH STENT INSERTION Left 3/1/2019    Procedure: LEFT URETEROSCOPY LASER LITHOTRIPSY STONE BASKET EXTRACTION STENT;  Surgeon: Hasmukh Krueger MD;  Location: [facility] MAIN OR;  Service: Urology   • URETEROSCOPY LASER LITHOTRIPSY WITH STENT INSERTION Left 3/15/2019    Procedure: LEFT URETEROSCOPY LASER LITHOTRIPSY WITH STENT INSERTION STONE BASKET EXTRACTION;  Surgeon: Hasmukh Krueger MD;  Location: [facility] MAIN OR;  Service: Urology   • WISDOM TOOTH EXTRACTION         Family History   Problem Relation Age of Onset   • Heart disease Mother    • Arthritis Mother    • Osteoporosis Mother    • Hypertension Mother    • Hyperlipidemia Mother    • Cancer Father    • Hodgkin's lymphoma Father    • Heart disease Maternal Grandmother    • Heart disease Maternal Grandfather    • Heart disease Paternal Grandmother    • Kidney disease Paternal Grandmother    • Heart disease Paternal Grandfather    • Kidney disease Paternal Grandfather    • Malig Hyperthermia Neg Hx          Review of Systems   Constitutional: Positive for activity change. Negative for chills, diaphoresis, fever and unexpected weight change.   HENT: Negative for hearing loss, nosebleeds, sore throat and tinnitus.    Eyes: Negative for pain and visual disturbance.   Respiratory: Negative for cough, shortness of breath and wheezing.    Cardiovascular: Negative  "for chest pain and palpitations.   Gastrointestinal: Negative for abdominal pain, diarrhea, nausea and vomiting.   Endocrine: Positive for cold intolerance. Negative for heat intolerance and polydipsia.   Genitourinary: Negative for difficulty urinating, dysuria and hematuria.   Musculoskeletal: Positive for arthralgias, back pain, gait problem, joint swelling, myalgias and neck pain.   Skin: Negative for rash and wound.   Allergic/Immunologic: Positive for environmental allergies.   Neurological: Negative for dizziness, syncope and numbness.   Hematological: Does not bruise/bleed easily.   Psychiatric/Behavioral: Negative for dysphoric mood and sleep disturbance. The patient is not nervous/anxious.            Objective:  Vitals:    07/14/21 0910   BP: 115/79   BP Location: Left arm   Pulse: 58   Weight: 86.5 kg (190 lb 9.6 oz)   Height: 172.7 cm (68\")         07/14/21  0910   Weight: 86.5 kg (190 lb 9.6 oz)     Body mass index is 28.98 kg/m².  Physical Exam    Vital signs reviewed.   General: No acute distress, alert and oriented  Eyes: conjunctiva clear; pupils equally round and reactive  ENT: external ears and nose atraumatic; oropharynx clear  CV: no peripheral edema  Resp: normal respiratory effort  Skin: no rashes or wounds; normal turgor  Psych: mood and affect appropriate; recent and remote memory intact          Ortho Exam       Right Knee-  Slight varus alignment.   ROM 0 to 130 degrees  4+/5 on flexion  4+/5 on extension  Maximal tenderness at medial joint line     Effusion- Moderate  Grade 1A Lachman  Anterior drawer- Negative  Posterior drawer- Negative  Stable opening on varus and valgus stress at 0 and 30    Log roll-  Negative, but mild pain over the lateral hip  [facility]  Negative, but mild pain over the lateral hip     Maura- Moderately positive, medial joint line.     Positive sensation light touch all distributions symmetric to contralateral side  Brisk cap refill all digits  1+ dorsalis " pedis pulse    Imaging:  Right Knee X-Ray  Indication: Pain    AP, Lateral, and Shonto views    Findings:  No fracture  No bony lesion  Normal soft tissues  Advanced tricompartmental osteoarthritis most significant in the medial compartment with bone-on-bone articulation and overall varus alignment and reactive osteophyte formation noted primarily along medial femoral condyle and medial tibial plateau    No prior studies were available for comparison.    Assessment:        1. Pain    2. Primary osteoarthritis of right knee           Plan:          1. Discussed treatment options at length with patient at today's visit, including an offloader brace, anti-inflammatories, cortisone injection, viscosupplementation, and a knee arthroplasty.   2. She is a candidate for a total knee arthroplasty. I explained she has more arthritis than I would have expected and bone spurs are present. The patient has elected to proceed with a right total knee arthroplasty.  She has pain on a daily basis with associated crepitus that has not responded to conservative treatment as noted above.  3. I reviewed anatomy and a model of a total knee arthroplasty, as well as typical postoperative recovery of 6-12 months before maximal recovery, and possible need for rehabilitation stay after hospitalization. We also discussed risks, benefits, and alternatives of procedure with risks including but not limited to neurovascular damage, bleeding, infection, malalignment, chronic pain, failure of implants, osteolysis, loosening of implants, loss of motion, weakness, stiffness, instability, DVT, pulmonary embolus, death, stroke, complex regional pain syndrome, myocardial infarction, and need for additional procedures. Patient understood all these and had all questions answered before consenting to the procedure. No guarantees were given in regards to results from the surgery. We will have patient medically optimized by their primary care physician and  plan on proceeding with surgery at next available date.  4. She has kidney disease. She has no history of blood clots, is not diabetic, and denies heart issues.v she does have an allergy to nickel        Jennifer Arndt was in agreement with plan and had all questions answered.     Orders:  Orders Placed This Encounter   Procedures   • MRSA Screen Culture (Outpatient) - Swab, Nares   • XR Knee 3 View Right   • Basic metabolic panel   • Protime-INR   • APTT   • Urinalysis With Culture If Indicated -   • Follow anesthesia standing orders.   • Provide instructions to patient regarding NPO status   • Care Order / Instruction for all Female Patients   • Provide NPO Instructions to Patient   • ECG 12 Lead   • CBC and Differential       Medications:  No orders of the defined types were placed in this encounter.      Followup:  Return for preop visit.    Diagnoses and all orders for this visit:    1. Pain (Primary)  -     XR Knee 3 View Right    2. Primary osteoarthritis of right knee  -     Case Request; Standing  -     CBC and Differential; Future  -     Basic metabolic panel; Future  -     Protime-INR; Future  -     APTT; Future  -     MRSA Screen Culture (Outpatient) - Swab, Nares; Future  -     Urinalysis With Culture If Indicated -; Future  -     ECG 12 Lead; Future  -     Tranexamic Acid 1,000 mg in sodium chloride 0.9 % 100 mL  -     Tranexamic Acid 1,000 mg in sodium chloride 0.9 % 100 mL  -     acetaminophen (TYLENOL) tablet 975 mg  -     pregabalin (LYRICA) capsule 150 mg  -     vancomycin (VANCOCIN) 1,250 mg in sodium chloride 0.9 % 250 mL IVPB  -     Case Request    Other orders  -     Initiate Observation Status; Standing  -     Follow anesthesia standing orders.  -     Provide instructions to patient regarding NPO status  -     Care Order / Instruction for all Female Patients  -     Follow anesthesia standing orders.; Standing  -     Verify NPO Status; Standing  -     SCD (sequential compression device)- to  be placed on patient in Pre-op; Standing  -     Clip operative site; Standing  -     Obtain informed consent (if not collected inpatient or PAT); Standing  -     Type & Screen; Standing  -     Provide NPO Instructions to Patient  -     Inpatient Consult to Hospitalist; Standing          Dictated utilizing Dragon dictation     Scribed for John Chan MD by Radha Guzman.  07/14/21   15:58 EDT    I have personally performed the services described in this document as scribed by the above individual, and it is both accurate and complete.  John Chan MD  7/20/2021  18:14 EDT

## 2021-07-20 ENCOUNTER — PREP FOR SURGERY (OUTPATIENT)
Dept: OTHER | Facility: HOSPITAL | Age: 61
End: 2021-07-20

## 2021-07-20 PROBLEM — R52 PAIN: Status: ACTIVE | Noted: 2021-07-20

## 2021-07-20 RX ORDER — PREGABALIN 150 MG/1
150 CAPSULE ORAL ONCE
Status: CANCELLED | OUTPATIENT
Start: 2021-07-20 | End: 2021-07-20

## 2021-07-20 RX ORDER — ACETAMINOPHEN 325 MG/1
1000 TABLET ORAL ONCE
Status: CANCELLED | OUTPATIENT
Start: 2021-07-20 | End: 2021-07-20

## 2021-07-27 ENCOUNTER — TRANSCRIBE ORDERS (OUTPATIENT)
Dept: ADMINISTRATIVE | Facility: HOSPITAL | Age: 61
End: 2021-07-27

## 2021-07-27 DIAGNOSIS — U07.1 COVID-19: Primary | ICD-10-CM

## 2021-08-31 ENCOUNTER — PRE-ADMISSION TESTING (OUTPATIENT)
Dept: PREADMISSION TESTING | Facility: HOSPITAL | Age: 61
End: 2021-08-31

## 2021-08-31 ENCOUNTER — APPOINTMENT (OUTPATIENT)
Dept: PREADMISSION TESTING | Facility: HOSPITAL | Age: 61
End: 2021-08-31

## 2021-08-31 ENCOUNTER — OFFICE VISIT (OUTPATIENT)
Dept: ORTHOPEDIC SURGERY | Facility: CLINIC | Age: 61
End: 2021-08-31

## 2021-08-31 VITALS
WEIGHT: 195 LBS | HEIGHT: 68 IN | HEART RATE: 62 BPM | DIASTOLIC BLOOD PRESSURE: 66 MMHG | OXYGEN SATURATION: 98 % | BODY MASS INDEX: 29.55 KG/M2 | RESPIRATION RATE: 16 BRPM | SYSTOLIC BLOOD PRESSURE: 105 MMHG

## 2021-08-31 VITALS — WEIGHT: 195 LBS | BODY MASS INDEX: 29.55 KG/M2 | HEIGHT: 68 IN

## 2021-08-31 DIAGNOSIS — M17.11 PRIMARY OSTEOARTHRITIS OF RIGHT KNEE: Primary | ICD-10-CM

## 2021-08-31 DIAGNOSIS — M17.11 PRIMARY OSTEOARTHRITIS OF RIGHT KNEE: ICD-10-CM

## 2021-08-31 LAB
ABO GROUP BLD: NORMAL
ABO GROUP BLD: NORMAL
ANION GAP SERPL CALCULATED.3IONS-SCNC: 8.1 MMOL/L (ref 5–15)
APTT PPP: 28.6 SECONDS (ref 24.3–38.1)
BACTERIA UR QL AUTO: ABNORMAL /HPF
BASOPHILS # BLD AUTO: 0.03 10*3/MM3 (ref 0–0.2)
BASOPHILS NFR BLD AUTO: 0.5 % (ref 0–1.5)
BILIRUB UR QL STRIP: NEGATIVE
BLD GP AB SCN SERPL QL: NEGATIVE
BUN SERPL-MCNC: 21 MG/DL (ref 8–23)
BUN/CREAT SERPL: 16.8 (ref 7–25)
CALCIUM SPEC-SCNC: 9.4 MG/DL (ref 8.6–10.5)
CHLORIDE SERPL-SCNC: 111 MMOL/L (ref 98–107)
CLARITY UR: CLEAR
CO2 SERPL-SCNC: 18.9 MMOL/L (ref 22–29)
COLOR UR: YELLOW
CREAT SERPL-MCNC: 1.25 MG/DL (ref 0.57–1)
DEPRECATED RDW RBC AUTO: 48.2 FL (ref 37–54)
EOSINOPHIL # BLD AUTO: 0.23 10*3/MM3 (ref 0–0.4)
EOSINOPHIL NFR BLD AUTO: 4 % (ref 0.3–6.2)
ERYTHROCYTE [DISTWIDTH] IN BLOOD BY AUTOMATED COUNT: 13.8 % (ref 12.3–15.4)
GFR SERPL CREATININE-BSD FRML MDRD: 43 ML/MIN/1.73
GLUCOSE SERPL-MCNC: 95 MG/DL (ref 65–99)
GLUCOSE UR STRIP-MCNC: NEGATIVE MG/DL
HCT VFR BLD AUTO: 39.6 % (ref 34–46.6)
HGB BLD-MCNC: 12.8 G/DL (ref 12–15.9)
HGB UR QL STRIP.AUTO: ABNORMAL
HYALINE CASTS UR QL AUTO: ABNORMAL /LPF
IMM GRANULOCYTES # BLD AUTO: 0.02 10*3/MM3 (ref 0–0.05)
IMM GRANULOCYTES NFR BLD AUTO: 0.3 % (ref 0–0.5)
INR PPP: 1.06 (ref 0.9–1.1)
KETONES UR QL STRIP: NEGATIVE
LEUKOCYTE ESTERASE UR QL STRIP.AUTO: ABNORMAL
LYMPHOCYTES # BLD AUTO: 1.56 10*3/MM3 (ref 0.7–3.1)
LYMPHOCYTES NFR BLD AUTO: 26.8 % (ref 19.6–45.3)
MCH RBC QN AUTO: 30.5 PG (ref 26.6–33)
MCHC RBC AUTO-ENTMCNC: 32.3 G/DL (ref 31.5–35.7)
MCV RBC AUTO: 94.5 FL (ref 79–97)
MONOCYTES # BLD AUTO: 0.89 10*3/MM3 (ref 0.1–0.9)
MONOCYTES NFR BLD AUTO: 15.3 % (ref 5–12)
NEUTROPHILS NFR BLD AUTO: 3.09 10*3/MM3 (ref 1.7–7)
NEUTROPHILS NFR BLD AUTO: 53.1 % (ref 42.7–76)
NITRITE UR QL STRIP: NEGATIVE
NRBC BLD AUTO-RTO: 0 /100 WBC (ref 0–0.2)
PH UR STRIP.AUTO: 7 [PH] (ref 4.5–8)
PLATELET # BLD AUTO: 207 10*3/MM3 (ref 140–450)
PMV BLD AUTO: 10.3 FL (ref 6–12)
POTASSIUM SERPL-SCNC: 4 MMOL/L (ref 3.5–5.2)
PROT UR QL STRIP: NEGATIVE
PROTHROMBIN TIME: 13.8 SECONDS (ref 12.1–15)
QT INTERVAL: 412 MS
RBC # BLD AUTO: 4.19 10*6/MM3 (ref 3.77–5.28)
RBC # UR: ABNORMAL /HPF
REF LAB TEST METHOD: ABNORMAL
RH BLD: POSITIVE
RH BLD: POSITIVE
SODIUM SERPL-SCNC: 138 MMOL/L (ref 136–145)
SP GR UR STRIP: 1.01 (ref 1–1.03)
SQUAMOUS #/AREA URNS HPF: ABNORMAL /HPF
T&S EXPIRATION DATE: NORMAL
UROBILINOGEN UR QL STRIP: ABNORMAL
WBC # BLD AUTO: 5.82 10*3/MM3 (ref 3.4–10.8)
WBC UR QL AUTO: ABNORMAL /HPF

## 2021-08-31 PROCEDURE — 86901 BLOOD TYPING SEROLOGIC RH(D): CPT

## 2021-08-31 PROCEDURE — 86850 RBC ANTIBODY SCREEN: CPT | Performed by: ORTHOPAEDIC SURGERY

## 2021-08-31 PROCEDURE — S0260 H&P FOR SURGERY: HCPCS | Performed by: ORTHOPAEDIC SURGERY

## 2021-08-31 PROCEDURE — 85730 THROMBOPLASTIN TIME PARTIAL: CPT | Performed by: ORTHOPAEDIC SURGERY

## 2021-08-31 PROCEDURE — 86901 BLOOD TYPING SEROLOGIC RH(D): CPT | Performed by: ORTHOPAEDIC SURGERY

## 2021-08-31 PROCEDURE — 87081 CULTURE SCREEN ONLY: CPT | Performed by: ORTHOPAEDIC SURGERY

## 2021-08-31 PROCEDURE — 87186 SC STD MICRODIL/AGAR DIL: CPT

## 2021-08-31 PROCEDURE — 93005 ELECTROCARDIOGRAM TRACING: CPT

## 2021-08-31 PROCEDURE — 80048 BASIC METABOLIC PNL TOTAL CA: CPT | Performed by: ORTHOPAEDIC SURGERY

## 2021-08-31 PROCEDURE — 86900 BLOOD TYPING SEROLOGIC ABO: CPT | Performed by: ORTHOPAEDIC SURGERY

## 2021-08-31 PROCEDURE — 93010 ELECTROCARDIOGRAM REPORT: CPT | Performed by: INTERNAL MEDICINE

## 2021-08-31 PROCEDURE — 85025 COMPLETE CBC W/AUTO DIFF WBC: CPT | Performed by: ORTHOPAEDIC SURGERY

## 2021-08-31 PROCEDURE — 86900 BLOOD TYPING SEROLOGIC ABO: CPT

## 2021-08-31 PROCEDURE — 81001 URINALYSIS AUTO W/SCOPE: CPT | Performed by: ORTHOPAEDIC SURGERY

## 2021-08-31 PROCEDURE — 85610 PROTHROMBIN TIME: CPT | Performed by: ORTHOPAEDIC SURGERY

## 2021-08-31 PROCEDURE — 87086 URINE CULTURE/COLONY COUNT: CPT | Performed by: ORTHOPAEDIC SURGERY

## 2021-08-31 PROCEDURE — 36415 COLL VENOUS BLD VENIPUNCTURE: CPT

## 2021-08-31 PROCEDURE — 87077 CULTURE AEROBIC IDENTIFY: CPT | Performed by: ORTHOPAEDIC SURGERY

## 2021-08-31 RX ORDER — MULTIVIT WITH MINERALS/LUTEIN
1000 TABLET ORAL EVERY MORNING
COMMUNITY

## 2021-08-31 NOTE — PROGRESS NOTES
Cc: f/u right knee pain, DJD    Patient presented to clinic today for preoperative history and physical visit in anticipation of upcoming scheduled right total knee arthroplasty.    I reviewed anatomy and a model of a total knee arthroplasty, as well as typical postoperative recovery of 6-12 months before maxiaml recovery, and possible need for rehabilitation stay after hospitalization. We also discussed risks, benefits, and alternatives of procedure with risks including but not limited to neurovascular damage, bleeding, infection, malalignment, chronic pian, failure of implants, osteolysis, loosening of implants, loss of motion, weakness, stiffness, instability, DVT, pulmonary embolus, death, stroke, complex regional pain syndrome, myocardial infarction, and need for additional procedures. Patient understood all these and had all questions answered before consenting to the procedure. No guarantees were given in regards to results from the surgery.  Patient has been medically optimize by his primary care physician.    Postoperative DVT prophylaxis will be with Eliquis due to her underlying kidney failure     I have given patient prescription today as well for Bactroban for nasal swabs     All remaining questions answered today.

## 2021-08-31 NOTE — H&P
History & Physical       Patient: Jennifer Arndt    YOB: 1960    Medical Record Number: [id number]    Surgeon:  Dr. John Chan    Chief Complaints: Right knee pain, DJD    Subjective:  This problem is not new to this examiner.     History of Present Illness: 61 y.o. female presents with right knee pain and arthritis. Onset of symptoms was years ago and has been progressively worsening despite more conservative treatment measures.  Symptoms are associated with ability to move, exercise, and perform activities of daily living.  Symptoms are aggravated by weight bearing and ROM necessary for activities of daily living.   Symptoms improve with rest, ice and elevation only minimally.      Allergies:   Allergies   Allergen Reactions   • Morphine Swelling     FELT LIKE THROAT SWELLED, SOME SWALLOWING TROUBLE   • Nickel Swelling   • Nsaids Swelling     STAGE III KIDNEY FAILURE     • Morphine And Related Swelling     FELT LIKE THROAT SWELLED, SOME SWALLOWING TROUBLE  FELT LIKE THROAT SWELLED, SOME SWALLOWING TROUBLE         Medications:   Home Medications:  Current Outpatient Medications on File Prior to Visit   Medication Sig   • atorvastatin (LIPITOR) 10 MG tablet Take 10 mg by mouth Every Night.   • Calcium Carbonate (CALCARB 600 PO) Take 600 mg by mouth 2 (Two) Times a Day.   • loratadine (CLARITIN) 10 MG tablet Take 10 mg by mouth Daily As Needed.   • Multiple Vitamins-Minerals (MULTI-YAHIR PO) Take 1 tablet by mouth Every Evening.   • omeprazole (priLOSEC) 20 MG capsule Take 20 mg by mouth Daily As Needed.   • potassium citrate (UROCIT-K) 10 MEQ (1080 MG) CR tablet Take 10 mEq by mouth Every Evening.   • VITAMIN D, CHOLECALCIFEROL, PO Take 1,000 Units by mouth Every Morning.     No current facility-administered medications on file prior to visit.     Current Medications:  Scheduled Meds:  Continuous Infusions:No current facility-administered medications for this visit.    PRN Meds:.    I have  "reviewed the patient's medical history in detail and updated the computerized patient record.  Review and summarization of old records include:    Past Medical History:   Diagnosis Date   • Anesthesia     PT STATES SENSITIVE TO AND B/P DROPS   • Arthritis     GENERALIZED   • Bruises easily    • CKD (chronic kidney disease)     Stage 3   • Depression    • Emphysema lung (CMS/HCC)     WAS TOLD SHE HAD THIS PER CT SCAN MAY 2018.... NO INHALERS   • Fibroids    • GERD (gastroesophageal reflux disease)    • H/O menorrhagia    • Hiatal hernia    • Hyperlipidemia    • Kidney calculi    • Kidney stone    • Recurrent UTI    • Right knee pain     \"BONE ON BONE\"   • Sjogren's disease (CMS/HCC)    • Spinal headache     WITH         Past Surgical History:   Procedure Laterality Date   • BREAST FIBROADENOMA SURGERY Left     4 cysts removed-all benign   • BREAST SURGERY     •  SECTION      x3   • CHOLECYSTECTOMY      LAP   • COLONOSCOPY  2018   • CYSTOSCOPY N/A 2016    Procedure: CYSTOSCOPY;  Surgeon: Mikey Aguirre MD;  Location: [facility];  Service:    • HYSTERECTOMY      Dr. Foster-menorrhagia   • KIDNEY STONE SURGERY     • URETEROSCOPY LASER LITHOTRIPSY WITH STENT INSERTION Left 2016    Procedure: lt URETEROSCOPY LASER LITHOTRIPSY WITH stone basket extraction w/STENT INSERTION;  Surgeon: Hasmukh Krueger MD;  Location: [facility];  Service:    • URETEROSCOPY LASER LITHOTRIPSY WITH STENT INSERTION Left 3/1/2019    Procedure: LEFT URETEROSCOPY LASER LITHOTRIPSY STONE BASKET EXTRACTION STENT;  Surgeon: Hasmukh Krueger MD;  Location: [facility] OR;  Service: Urology   • URETEROSCOPY LASER LITHOTRIPSY WITH STENT INSERTION Left 3/15/2019    Procedure: LEFT URETEROSCOPY LASER LITHOTRIPSY WITH STENT INSERTION STONE BASKET EXTRACTION;  Surgeon: Hasmukh Krueger MD;  Location: [facility] OR;  Service: Urology   • WISDOM TOOTH EXTRACTION          Social History     Occupational " History     Employer: RETIRED     Comment: [employer]   Tobacco Use   • Smoking status: Former Smoker     Packs/day: 0.50     Years: 43.00     Pack years: 21.50     Types: Cigarettes     Quit date: 2017     Years since quittin.0   • Smokeless tobacco: Never Used   • Tobacco comment: QUIT 2017   Vaping Use   • Vaping Use: Never used   Substance and Sexual Activity   • Alcohol use: No   • Drug use: No   • Sexual activity: Defer     Partners: Female      Social History     Social History Narrative   • Not on file        Family History   Problem Relation Age of Onset   • Heart disease Mother    • Arthritis Mother    • Osteoporosis Mother    • Hypertension Mother    • Hyperlipidemia Mother    • Cancer Father    • Hodgkin's lymphoma Father    • Heart disease Maternal Grandmother    • Heart disease Maternal Grandfather    • Heart disease Paternal Grandmother    • Kidney disease Paternal Grandmother    • Heart disease Paternal Grandfather    • Kidney disease Paternal Grandfather    • Malig Hyperthermia Neg Hx        ROS: 14 point review of systems was performed and was negative except for documented findings in HPI and today's encounter.     Allergies:   Allergies   Allergen Reactions   • Morphine Swelling     FELT LIKE THROAT SWELLED, SOME SWALLOWING TROUBLE   • Nickel Swelling   • Nsaids Swelling     STAGE III KIDNEY FAILURE     • Morphine And Related Swelling     FELT LIKE THROAT SWELLED, SOME SWALLOWING TROUBLE  FELT LIKE THROAT SWELLED, SOME SWALLOWING TROUBLE       Constitutional:  Denies fever, shaking or chills   Eyes:  Denies change in visual acuity   HENT:  Denies nasal congestion or sore throat   Respiratory:  Denies cough or shortness of breath   Cardiovascular:  Denies chest pain or severe LE edema   GI:  Denies abdominal pain, nausea, vomiting, bloody stools or diarrhea   Musculoskeletal:  Denies numbness tingling or loss of motor function except as outlined above in history of present  illness.  : Denies painful urination or hematuria  Integument:  Denies rash, lesion or ulceration   Neurologic:  Denies headache or focal weakness  Endocrine:  Denies lymphadenopathy  Psych:  Denies confusion or change in mental status   Hem:  Denies active bleeding    Physical Exam: 61 y.o. female  There is no height or weight on file to calculate BMI.  There were no vitals filed for this visit.  Vital signs reviewed.   General Appearance:    Alert, cooperative, in no acute distress                  Eyes: conjunctiva clear  ENT: external ears and nose atraumatic  CV: no peripheral edema  Resp: normal respiratory effort  Skin: no rashes or wounds; normal turgor  Psych: mood and affect appropriate  Lymph: no nodes appreciated  Neuro: gross sensation intact  Vascular:  Palpable peripheral pulse in noted extremity  Musculoskeletal Extremities: Right Knee-  Slight varus alignment.   ROM 0 to 130 degrees  4+/5 on flexion  4+/5 on extension  Maximal tenderness at medial joint line     Effusion- Moderate  Grade 1A Lachman  Anterior drawer- Negative  Posterior drawer- Negative  Stable opening on varus and valgus stress at 0 and 30     Log roll-  Negative, but mild pain over the lateral hip  Stinchfield-  Negative, but mild pain over the lateral hip     Maura- Moderately positive, medial joint line.     Positive sensation light touch all distributions symmetric to contralateral side  Brisk cap refill all digits  1+ dorsalis pedis pulse    Debilities/Disabilities Identified: None      Diagnostic Tests:  Pre-Admission Testing on 08/31/2021   Component Date Value Ref Range Status   • QT Interval 08/31/2021 412  ms Preliminary   • Color, UA 08/31/2021 Yellow  Yellow, Straw Final   • Appearance, UA 08/31/2021 Clear  Clear Final   • pH, UA 08/31/2021 7.0  4.5 - 8.0 Final   • Specific Gravity, UA 08/31/2021 1.015  1.003 - 1.030 Final   • Glucose, UA 08/31/2021 Negative  Negative Final   • Ketones, UA 08/31/2021 Negative   Negative Final   • Bilirubin, UA 08/31/2021 Negative  Negative Final   • Blood, UA 08/31/2021 Trace* Negative Final   • Protein, UA 08/31/2021 Negative  Negative Final   • Leuk Esterase, UA 08/31/2021 Moderate (2+)* Negative Final   • Nitrite, UA 08/31/2021 Negative  Negative Final   • Urobilinogen, UA 08/31/2021 0.2 E.U./dL  0.2 - 1.0 E.U./dL Final   • PTT 08/31/2021 28.6  24.3 - 38.1 seconds Final   • Protime 08/31/2021 13.8  12.1 - 15.0 Seconds Final   • INR 08/31/2021 1.06  0.90 - 1.10 Final   • Glucose 08/31/2021 95  65 - 99 mg/dL Final   • BUN 08/31/2021 21  8 - 23 mg/dL Final   • Creatinine 08/31/2021 1.25* 0.57 - 1.00 mg/dL Final   • Sodium 08/31/2021 138  136 - 145 mmol/L Final   • Potassium 08/31/2021 4.0  3.5 - 5.2 mmol/L Final   • Chloride 08/31/2021 111* 98 - 107 mmol/L Final   • CO2 08/31/2021 18.9* 22.0 - 29.0 mmol/L Final   • Calcium 08/31/2021 9.4  8.6 - 10.5 mg/dL Final   • eGFR Non African Amer 08/31/2021 43* >60 mL/min/1.73 Final   • BUN/Creatinine Ratio 08/31/2021 16.8  7.0 - 25.0 Final   • Anion Gap 08/31/2021 8.1  5.0 - 15.0 mmol/L Final   • WBC 08/31/2021 5.82  3.40 - 10.80 10*3/mm3 Final   • RBC 08/31/2021 4.19  3.77 - 5.28 10*6/mm3 Final   • Hemoglobin 08/31/2021 12.8  12.0 - 15.9 g/dL Final   • Hematocrit 08/31/2021 39.6  34.0 - 46.6 % Final   • MCV 08/31/2021 94.5  79.0 - 97.0 fL Final   • MCH 08/31/2021 30.5  26.6 - 33.0 pg Final   • MCHC 08/31/2021 32.3  31.5 - 35.7 g/dL Final   • RDW 08/31/2021 13.8  12.3 - 15.4 % Final   • RDW-SD 08/31/2021 48.2  37.0 - 54.0 fl Final   • MPV 08/31/2021 10.3  6.0 - 12.0 fL Final   • Platelets 08/31/2021 207  140 - 450 10*3/mm3 Final   • Neutrophil % 08/31/2021 53.1  42.7 - 76.0 % Final   • Lymphocyte % 08/31/2021 26.8  19.6 - 45.3 % Final   • Monocyte % 08/31/2021 15.3* 5.0 - 12.0 % Final   • Eosinophil % 08/31/2021 4.0  0.3 - 6.2 % Final   • Basophil % 08/31/2021 0.5  0.0 - 1.5 % Final   • Immature Grans % 08/31/2021 0.3  0.0 - 0.5 % Final   •  Neutrophils, Absolute 08/31/2021 3.09  1.70 - 7.00 10*3/mm3 Final   • Lymphocytes, Absolute 08/31/2021 1.56  0.70 - 3.10 10*3/mm3 Final   • Monocytes, Absolute 08/31/2021 0.89  0.10 - 0.90 10*3/mm3 Final   • Eosinophils, Absolute 08/31/2021 0.23  0.00 - 0.40 10*3/mm3 Final   • Basophils, Absolute 08/31/2021 0.03  0.00 - 0.20 10*3/mm3 Final   • Immature Grans, Absolute 08/31/2021 0.02  0.00 - 0.05 10*3/mm3 Final   • nRBC 08/31/2021 0.0  0.0 - 0.2 /100 WBC Final   • RBC, UA 08/31/2021 0-2* None Seen /HPF Final   • WBC, UA 08/31/2021 21-30* None Seen /HPF Final   • Bacteria, UA 08/31/2021 None Seen  None Seen /HPF Final   • Squamous Epithelial Cells, UA 08/31/2021 0-2  None Seen, 0-2 /HPF Final   • Hyaline Casts, UA 08/31/2021 None Seen  None Seen /LPF Final   • Methodology 08/31/2021 Manual Light Microscopy   Final     No results found.     Assessment:  Patient Active Problem List   Diagnosis   • Acute pyelonephritis   • Left ureteral stone   • Abnormal serum total protein level   • Primary osteoarthritis of right knee   • Pain       Plan:  I reviewed anatomy of a total joint arthroplasty in laymen's terms, as well as typical postoperative recovery and possibly 6-12 months for maximal recovery, and possible need for rehabilitation stay after hospitalization. We also discussed risks, benefits, alternatives, and limitations of procedure with risks including but not limited to neurovascular damage, bleeding, infection, malalignment, chronic pian, failure of implants, osteolysis, loosening of implants, loss of motion, weakness, stiffness, instability, DVT, pulmonary embolus, death, stroke, complex regional pain syndrome, myocardial infarction, and need for additional procedures. No guarantees were given regarding results of surgery.      Jennifer S Nancyani was given the opportunity to ask and have all questions answered today.  The patient voiced understanding of the risks, benefits, and alternative forms of treatment that  were discussed and the patient consents to proceed with surgery.     Patient's blood clot history is negative.    Plan for DVT prophylaxis is Eliquis given underlying kidney failure    Patient's MRSA infection history is negative.    Patient's skin infection history is negative.    Patient does have a nickel allergy    Discharge Plan: POD 2-3 to home    Date: 8/31/2021    Dictated utilizing Dragon dictation

## 2021-09-01 LAB — MRSA SPEC QL CULT: NORMAL

## 2021-09-02 LAB — BACTERIA SPEC AEROBE CULT: ABNORMAL

## 2021-09-02 ASSESSMENT — KOOS JR
KOOS JR SCORE: 42.281
KOOS JR SCORE: 18

## 2021-09-10 ENCOUNTER — APPOINTMENT (OUTPATIENT)
Dept: LAB | Facility: HOSPITAL | Age: 61
End: 2021-09-10

## 2021-12-02 ENCOUNTER — PREP FOR SURGERY (OUTPATIENT)
Dept: OTHER | Facility: HOSPITAL | Age: 61
End: 2021-12-02

## 2021-12-02 RX ORDER — ACETAMINOPHEN 500 MG
1000 TABLET ORAL EVERY 6 HOURS PRN
COMMUNITY
End: 2021-12-08 | Stop reason: HOSPADM

## 2021-12-02 NOTE — PRE-PROCEDURE INSTRUCTIONS
Pt history reviewed/updated by phone. Pt has chg soap and joint camp book from previous appointment. COVID test 12/4. Notified surgeon's office of need for lab orders if new labs wanted, pt aware as well. Instructed clears until 5:00 am dos, voiced understanding. Medical clearance from 9/2021 in media.

## 2021-12-03 ENCOUNTER — TELEPHONE (OUTPATIENT)
Dept: ORTHOPEDIC SURGERY | Facility: CLINIC | Age: 61
End: 2021-12-03

## 2021-12-03 DIAGNOSIS — Z01.818 PREOP EXAMINATION: Primary | ICD-10-CM

## 2021-12-03 NOTE — TELEPHONE ENCOUNTER
Patient access calling they cannot see the Covid orders placed yesterday for pre op.    New orders pended for your verification and approval.    Thanks.

## 2021-12-04 ENCOUNTER — LAB (OUTPATIENT)
Dept: LAB | Facility: HOSPITAL | Age: 61
End: 2021-12-04

## 2021-12-05 LAB — SARS-COV-2 RNA PNL SPEC NAA+PROBE: NOT DETECTED

## 2021-12-05 PROCEDURE — C9803 HOPD COVID-19 SPEC COLLECT: HCPCS | Performed by: ORTHOPAEDIC SURGERY

## 2021-12-05 PROCEDURE — 87635 SARS-COV-2 COVID-19 AMP PRB: CPT | Performed by: ORTHOPAEDIC SURGERY

## 2021-12-06 ENCOUNTER — ANESTHESIA EVENT (OUTPATIENT)
Dept: PERIOP | Facility: HOSPITAL | Age: 61
End: 2021-12-06

## 2021-12-07 ENCOUNTER — ANESTHESIA (OUTPATIENT)
Dept: PERIOP | Facility: HOSPITAL | Age: 61
End: 2021-12-07

## 2021-12-07 ENCOUNTER — APPOINTMENT (OUTPATIENT)
Dept: GENERAL RADIOLOGY | Facility: HOSPITAL | Age: 61
End: 2021-12-07

## 2021-12-07 ENCOUNTER — HOSPITAL ENCOUNTER (OUTPATIENT)
Facility: HOSPITAL | Age: 61
Discharge: HOME | End: 2021-12-08
Attending: ORTHOPAEDIC SURGERY | Admitting: ORTHOPAEDIC SURGERY

## 2021-12-07 DIAGNOSIS — M17.11 PRIMARY OSTEOARTHRITIS OF RIGHT KNEE: ICD-10-CM

## 2021-12-07 DIAGNOSIS — Z96.651 S/P TKR (TOTAL KNEE REPLACEMENT), RIGHT: Primary | ICD-10-CM

## 2021-12-07 LAB
ANION GAP SERPL CALCULATED.3IONS-SCNC: 12.4 MMOL/L (ref 5–15)
APTT PPP: 25.7 SECONDS (ref 24.3–38.1)
BASOPHILS # BLD AUTO: 0.04 10*3/MM3 (ref 0–0.2)
BASOPHILS NFR BLD AUTO: 0.9 % (ref 0–1.5)
BUN SERPL-MCNC: 18 MG/DL (ref 8–23)
BUN/CREAT SERPL: 13.7 (ref 7–25)
CALCIUM SPEC-SCNC: 9.1 MG/DL (ref 8.6–10.5)
CHLORIDE SERPL-SCNC: 109 MMOL/L (ref 98–107)
CO2 SERPL-SCNC: 18.6 MMOL/L (ref 22–29)
CREAT SERPL-MCNC: 1.31 MG/DL (ref 0.57–1)
DEPRECATED RDW RBC AUTO: 46.8 FL (ref 37–54)
EOSINOPHIL # BLD AUTO: 0.17 10*3/MM3 (ref 0–0.4)
EOSINOPHIL NFR BLD AUTO: 3.8 % (ref 0.3–6.2)
ERYTHROCYTE [DISTWIDTH] IN BLOOD BY AUTOMATED COUNT: 13.2 % (ref 12.3–15.4)
GFR SERPL CREATININE-BSD FRML MDRD: 41 ML/MIN/1.73
GLUCOSE SERPL-MCNC: 109 MG/DL (ref 65–99)
HBA1C MFR BLD: 5.9 % (ref 4.8–5.6)
HCT VFR BLD AUTO: 39.8 % (ref 34–46.6)
HGB BLD-MCNC: 13 G/DL (ref 12–15.9)
IMM GRANULOCYTES # BLD AUTO: 0.01 10*3/MM3 (ref 0–0.05)
IMM GRANULOCYTES NFR BLD AUTO: 0.2 % (ref 0–0.5)
INR PPP: 1 (ref 0.9–1.1)
LYMPHOCYTES # BLD AUTO: 1.66 10*3/MM3 (ref 0.7–3.1)
LYMPHOCYTES NFR BLD AUTO: 37.6 % (ref 19.6–45.3)
MCH RBC QN AUTO: 31.2 PG (ref 26.6–33)
MCHC RBC AUTO-ENTMCNC: 32.7 G/DL (ref 31.5–35.7)
MCV RBC AUTO: 95.4 FL (ref 79–97)
MONOCYTES # BLD AUTO: 0.74 10*3/MM3 (ref 0.1–0.9)
MONOCYTES NFR BLD AUTO: 16.7 % (ref 5–12)
NEUTROPHILS NFR BLD AUTO: 1.8 10*3/MM3 (ref 1.7–7)
NEUTROPHILS NFR BLD AUTO: 40.8 % (ref 42.7–76)
NRBC BLD AUTO-RTO: 0 /100 WBC (ref 0–0.2)
PLATELET # BLD AUTO: 207 10*3/MM3 (ref 140–450)
PMV BLD AUTO: 10.7 FL (ref 6–12)
POTASSIUM SERPL-SCNC: 4.1 MMOL/L (ref 3.5–5.2)
PROTHROMBIN TIME: 13.4 SECONDS (ref 12.1–15)
RBC # BLD AUTO: 4.17 10*6/MM3 (ref 3.77–5.28)
SODIUM SERPL-SCNC: 140 MMOL/L (ref 136–145)
TSH SERPL DL<=0.05 MIU/L-ACNC: 1.55 UIU/ML (ref 0.27–4.2)
WBC NRBC COR # BLD: 4.42 10*3/MM3 (ref 3.4–10.8)

## 2021-12-07 PROCEDURE — 97165 OT EVAL LOW COMPLEX 30 MIN: CPT

## 2021-12-07 PROCEDURE — C1713 ANCHOR/SCREW BN/BN,TIS/BN: HCPCS | Performed by: ORTHOPAEDIC SURGERY

## 2021-12-07 PROCEDURE — 83036 HEMOGLOBIN GLYCOSYLATED A1C: CPT | Performed by: NURSE PRACTITIONER

## 2021-12-07 PROCEDURE — 97161 PT EVAL LOW COMPLEX 20 MIN: CPT

## 2021-12-07 PROCEDURE — G0378 HOSPITAL OBSERVATION PER HR: HCPCS

## 2021-12-07 PROCEDURE — 76942 ECHO GUIDE FOR BIOPSY: CPT | Performed by: ORTHOPAEDIC SURGERY

## 2021-12-07 PROCEDURE — 25010000002 VANCOMYCIN 750 MG RECONSTITUTED SOLUTION: Performed by: ORTHOPAEDIC SURGERY

## 2021-12-07 PROCEDURE — C1776 JOINT DEVICE (IMPLANTABLE): HCPCS | Performed by: ORTHOPAEDIC SURGERY

## 2021-12-07 PROCEDURE — 25010000002 MIDAZOLAM PER 1MG: Performed by: ANESTHESIOLOGY

## 2021-12-07 PROCEDURE — 27447 TOTAL KNEE ARTHROPLASTY: CPT | Performed by: SPECIALIST/TECHNOLOGIST, OTHER

## 2021-12-07 PROCEDURE — 25010000002 VANCOMYCIN PER 500 MG: Performed by: ORTHOPAEDIC SURGERY

## 2021-12-07 PROCEDURE — 25010000002 VANCOMYCIN 750 MG RECONSTITUTED SOLUTION 1 EACH VIAL: Performed by: ORTHOPAEDIC SURGERY

## 2021-12-07 PROCEDURE — 0 LIDOCAINE 1 % SOLUTION: Performed by: NURSE ANESTHETIST, CERTIFIED REGISTERED

## 2021-12-07 PROCEDURE — 94799 UNLISTED PULMONARY SVC/PX: CPT

## 2021-12-07 PROCEDURE — 25010000002 ONDANSETRON PER 1 MG: Performed by: ANESTHESIOLOGY

## 2021-12-07 PROCEDURE — C1889 IMPLANT/INSERT DEVICE, NOC: HCPCS | Performed by: ORTHOPAEDIC SURGERY

## 2021-12-07 PROCEDURE — 25010000002 HYDROMORPHONE 1 MG/ML SOLUTION: Performed by: NURSE ANESTHETIST, CERTIFIED REGISTERED

## 2021-12-07 PROCEDURE — 25010000002 PROPOFOL 10 MG/ML EMULSION: Performed by: NURSE ANESTHETIST, CERTIFIED REGISTERED

## 2021-12-07 PROCEDURE — 25010000002 DEXAMETHASONE PER 1 MG: Performed by: ANESTHESIOLOGY

## 2021-12-07 PROCEDURE — 85025 COMPLETE CBC W/AUTO DIFF WBC: CPT | Performed by: ORTHOPAEDIC SURGERY

## 2021-12-07 PROCEDURE — 84443 ASSAY THYROID STIM HORMONE: CPT | Performed by: NURSE PRACTITIONER

## 2021-12-07 PROCEDURE — 25010000002 PHENYLEPHRINE 10 MG/ML SOLUTION: Performed by: NURSE ANESTHETIST, CERTIFIED REGISTERED

## 2021-12-07 PROCEDURE — 27447 TOTAL KNEE ARTHROPLASTY: CPT | Performed by: ORTHOPAEDIC SURGERY

## 2021-12-07 PROCEDURE — 80048 BASIC METABOLIC PNL TOTAL CA: CPT | Performed by: ORTHOPAEDIC SURGERY

## 2021-12-07 PROCEDURE — 73560 X-RAY EXAM OF KNEE 1 OR 2: CPT

## 2021-12-07 PROCEDURE — 85730 THROMBOPLASTIN TIME PARTIAL: CPT | Performed by: ORTHOPAEDIC SURGERY

## 2021-12-07 PROCEDURE — 85610 PROTHROMBIN TIME: CPT | Performed by: ORTHOPAEDIC SURGERY

## 2021-12-07 PROCEDURE — 99203 OFFICE O/P NEW LOW 30 MIN: CPT | Performed by: NURSE PRACTITIONER

## 2021-12-07 PROCEDURE — 94761 N-INVAS EAR/PLS OXIMETRY MLT: CPT

## 2021-12-07 DEVICE — COMP FEM/KN PERSONA TIVANIUM CR CMT STD SZ8 RT: Type: IMPLANTABLE DEVICE | Site: KNEE | Status: FUNCTIONAL

## 2021-12-07 DEVICE — IMPLANTABLE DEVICE: Type: IMPLANTABLE DEVICE | Site: KNEE | Status: FUNCTIONAL

## 2021-12-07 DEVICE — DEV WND/CLS CONTRL TISS STRATAFIX SYMM PDS PLS CTX 60CM VIL: Type: IMPLANTABLE DEVICE | Site: KNEE | Status: FUNCTIONAL

## 2021-12-07 DEVICE — CMT BONE R 1X40: Type: IMPLANTABLE DEVICE | Site: KNEE | Status: FUNCTIONAL

## 2021-12-07 DEVICE — DEV CONTRL TISS STRATAFIX SPIRAL MNCRYL UD 3/0 PLS 45CM: Type: IMPLANTABLE DEVICE | Site: KNEE | Status: FUNCTIONAL

## 2021-12-07 DEVICE — STEM TIB/KN PERSONA CMT 5D SZD RT: Type: IMPLANTABLE DEVICE | Site: KNEE | Status: FUNCTIONAL

## 2021-12-07 DEVICE — CAP TOTL KN CMT PRIMARY: Type: IMPLANTABLE DEVICE | Site: KNEE | Status: FUNCTIONAL

## 2021-12-07 RX ORDER — SODIUM CHLORIDE, SODIUM LACTATE, POTASSIUM CHLORIDE, CALCIUM CHLORIDE 600; 310; 30; 20 MG/100ML; MG/100ML; MG/100ML; MG/100ML
9 INJECTION, SOLUTION INTRAVENOUS CONTINUOUS
Status: DISCONTINUED | OUTPATIENT
Start: 2021-12-07 | End: 2021-12-07

## 2021-12-07 RX ORDER — OXYCODONE HYDROCHLORIDE 5 MG/1
10 TABLET ORAL EVERY 4 HOURS PRN
Status: DISCONTINUED | OUTPATIENT
Start: 2021-12-07 | End: 2021-12-08 | Stop reason: HOSPADM

## 2021-12-07 RX ORDER — LIDOCAINE HYDROCHLORIDE 20 MG/ML
INJECTION, SOLUTION INFILTRATION; PERINEURAL AS NEEDED
Status: DISCONTINUED | OUTPATIENT
Start: 2021-12-07 | End: 2021-12-07 | Stop reason: SURG

## 2021-12-07 RX ORDER — SODIUM CHLORIDE 0.9 % (FLUSH) 0.9 %
10 SYRINGE (ML) INJECTION AS NEEDED
Status: DISCONTINUED | OUTPATIENT
Start: 2021-12-07 | End: 2021-12-07 | Stop reason: HOSPADM

## 2021-12-07 RX ORDER — LIDOCAINE HYDROCHLORIDE 10 MG/ML
INJECTION, SOLUTION INFILTRATION; PERINEURAL AS NEEDED
Status: DISCONTINUED | OUTPATIENT
Start: 2021-12-07 | End: 2021-12-07 | Stop reason: SURG

## 2021-12-07 RX ORDER — FENTANYL CITRATE 50 UG/ML
50 INJECTION, SOLUTION INTRAMUSCULAR; INTRAVENOUS
Status: DISCONTINUED | OUTPATIENT
Start: 2021-12-07 | End: 2021-12-07 | Stop reason: HOSPADM

## 2021-12-07 RX ORDER — BUPIVACAINE HYDROCHLORIDE 2.5 MG/ML
INJECTION, SOLUTION EPIDURAL; INFILTRATION; INTRACAUDAL
Status: COMPLETED | OUTPATIENT
Start: 2021-12-07 | End: 2021-12-07

## 2021-12-07 RX ORDER — DEXMEDETOMIDINE HYDROCHLORIDE 100 UG/ML
INJECTION, SOLUTION INTRAVENOUS AS NEEDED
Status: DISCONTINUED | OUTPATIENT
Start: 2021-12-07 | End: 2021-12-07 | Stop reason: SURG

## 2021-12-07 RX ORDER — MORPHINE SULFATE 2 MG/ML
6 INJECTION, SOLUTION INTRAMUSCULAR; INTRAVENOUS
Status: DISCONTINUED | OUTPATIENT
Start: 2021-12-07 | End: 2021-12-07

## 2021-12-07 RX ORDER — POTASSIUM CHLORIDE 20 MEQ/1
10 TABLET, EXTENDED RELEASE ORAL DAILY
Status: DISCONTINUED | OUTPATIENT
Start: 2021-12-07 | End: 2021-12-08 | Stop reason: HOSPADM

## 2021-12-07 RX ORDER — ONDANSETRON 2 MG/ML
4 INJECTION INTRAMUSCULAR; INTRAVENOUS EVERY 6 HOURS PRN
Status: DISCONTINUED | OUTPATIENT
Start: 2021-12-07 | End: 2021-12-08 | Stop reason: HOSPADM

## 2021-12-07 RX ORDER — PREGABALIN 75 MG/1
150 CAPSULE ORAL ONCE
Status: COMPLETED | OUTPATIENT
Start: 2021-12-07 | End: 2021-12-07

## 2021-12-07 RX ORDER — OXYCODONE HYDROCHLORIDE 5 MG/1
5 TABLET ORAL EVERY 4 HOURS PRN
Status: DISCONTINUED | OUTPATIENT
Start: 2021-12-07 | End: 2021-12-08 | Stop reason: HOSPADM

## 2021-12-07 RX ORDER — CETIRIZINE HYDROCHLORIDE 10 MG/1
10 TABLET ORAL DAILY
Status: DISCONTINUED | OUTPATIENT
Start: 2021-12-07 | End: 2021-12-08 | Stop reason: HOSPADM

## 2021-12-07 RX ORDER — SODIUM CHLORIDE 0.9 % (FLUSH) 0.9 %
10 SYRINGE (ML) INJECTION EVERY 12 HOURS SCHEDULED
Status: DISCONTINUED | OUTPATIENT
Start: 2021-12-07 | End: 2021-12-07 | Stop reason: HOSPADM

## 2021-12-07 RX ORDER — ONDANSETRON 2 MG/ML
4 INJECTION INTRAMUSCULAR; INTRAVENOUS ONCE AS NEEDED
Status: COMPLETED | OUTPATIENT
Start: 2021-12-07 | End: 2021-12-07

## 2021-12-07 RX ORDER — SODIUM CHLORIDE 9 MG/ML
40 INJECTION, SOLUTION INTRAVENOUS AS NEEDED
Status: DISCONTINUED | OUTPATIENT
Start: 2021-12-07 | End: 2021-12-07 | Stop reason: HOSPADM

## 2021-12-07 RX ORDER — DEXAMETHASONE SODIUM PHOSPHATE 4 MG/ML
8 INJECTION, SOLUTION INTRA-ARTICULAR; INTRALESIONAL; INTRAMUSCULAR; INTRAVENOUS; SOFT TISSUE ONCE
Status: COMPLETED | OUTPATIENT
Start: 2021-12-07 | End: 2021-12-07

## 2021-12-07 RX ORDER — ACETAMINOPHEN 325 MG/1
950 TABLET ORAL 3 TIMES DAILY
Status: DISCONTINUED | OUTPATIENT
Start: 2021-12-07 | End: 2021-12-08 | Stop reason: HOSPADM

## 2021-12-07 RX ORDER — EPHEDRINE SULFATE 50 MG/ML
INJECTION, SOLUTION INTRAVENOUS AS NEEDED
Status: DISCONTINUED | OUTPATIENT
Start: 2021-12-07 | End: 2021-12-07 | Stop reason: SURG

## 2021-12-07 RX ORDER — ATORVASTATIN CALCIUM 10 MG/1
10 TABLET, FILM COATED ORAL NIGHTLY
Status: DISCONTINUED | OUTPATIENT
Start: 2021-12-07 | End: 2021-12-08 | Stop reason: HOSPADM

## 2021-12-07 RX ORDER — ONDANSETRON 4 MG/1
4 TABLET, FILM COATED ORAL EVERY 6 HOURS PRN
Status: DISCONTINUED | OUTPATIENT
Start: 2021-12-07 | End: 2021-12-08 | Stop reason: HOSPADM

## 2021-12-07 RX ORDER — PROPOFOL 10 MG/ML
VIAL (ML) INTRAVENOUS AS NEEDED
Status: DISCONTINUED | OUTPATIENT
Start: 2021-12-07 | End: 2021-12-07 | Stop reason: SURG

## 2021-12-07 RX ORDER — PREGABALIN 75 MG/1
75 CAPSULE ORAL EVERY 12 HOURS SCHEDULED
Status: DISCONTINUED | OUTPATIENT
Start: 2021-12-07 | End: 2021-12-08 | Stop reason: HOSPADM

## 2021-12-07 RX ORDER — TRANEXAMIC ACID 100 MG/ML
INJECTION, SOLUTION INTRAVENOUS AS NEEDED
Status: DISCONTINUED | OUTPATIENT
Start: 2021-12-07 | End: 2021-12-07 | Stop reason: SURG

## 2021-12-07 RX ORDER — SODIUM CHLORIDE 9 MG/ML
75 INJECTION, SOLUTION INTRAVENOUS CONTINUOUS
Status: DISCONTINUED | OUTPATIENT
Start: 2021-12-07 | End: 2021-12-08 | Stop reason: HOSPADM

## 2021-12-07 RX ORDER — ACETAMINOPHEN 500 MG
1000 TABLET ORAL ONCE
Status: COMPLETED | OUTPATIENT
Start: 2021-12-07 | End: 2021-12-07

## 2021-12-07 RX ORDER — MIDAZOLAM HYDROCHLORIDE 2 MG/2ML
1 INJECTION, SOLUTION INTRAMUSCULAR; INTRAVENOUS
Status: DISCONTINUED | OUTPATIENT
Start: 2021-12-07 | End: 2021-12-07 | Stop reason: HOSPADM

## 2021-12-07 RX ORDER — FAMOTIDINE 10 MG/ML
20 INJECTION, SOLUTION INTRAVENOUS
Status: COMPLETED | OUTPATIENT
Start: 2021-12-07 | End: 2021-12-07

## 2021-12-07 RX ORDER — BUPIVACAINE HYDROCHLORIDE 7.5 MG/ML
INJECTION, SOLUTION EPIDURAL; RETROBULBAR
Status: COMPLETED | OUTPATIENT
Start: 2021-12-07 | End: 2021-12-07

## 2021-12-07 RX ORDER — ONDANSETRON 2 MG/ML
4 INJECTION INTRAMUSCULAR; INTRAVENOUS ONCE AS NEEDED
Status: DISCONTINUED | OUTPATIENT
Start: 2021-12-07 | End: 2021-12-07 | Stop reason: HOSPADM

## 2021-12-07 RX ORDER — KETAMINE HYDROCHLORIDE 10 MG/ML
INJECTION INTRAMUSCULAR; INTRAVENOUS AS NEEDED
Status: DISCONTINUED | OUTPATIENT
Start: 2021-12-07 | End: 2021-12-07 | Stop reason: SURG

## 2021-12-07 RX ORDER — PHENYLEPHRINE HYDROCHLORIDE 10 MG/ML
INJECTION INTRAVENOUS AS NEEDED
Status: DISCONTINUED | OUTPATIENT
Start: 2021-12-07 | End: 2021-12-07 | Stop reason: SURG

## 2021-12-07 RX ORDER — PANTOPRAZOLE SODIUM 40 MG/1
40 TABLET, DELAYED RELEASE ORAL EVERY MORNING
Status: DISCONTINUED | OUTPATIENT
Start: 2021-12-08 | End: 2021-12-08 | Stop reason: HOSPADM

## 2021-12-07 RX ORDER — MAGNESIUM HYDROXIDE 1200 MG/15ML
LIQUID ORAL AS NEEDED
Status: DISCONTINUED | OUTPATIENT
Start: 2021-12-07 | End: 2021-12-07 | Stop reason: HOSPADM

## 2021-12-07 RX ORDER — NALOXONE HCL 0.4 MG/ML
0.4 VIAL (ML) INJECTION
Status: DISCONTINUED | OUTPATIENT
Start: 2021-12-07 | End: 2021-12-08 | Stop reason: HOSPADM

## 2021-12-07 RX ORDER — BUPIVACAINE HYDROCHLORIDE 5 MG/ML
INJECTION, SOLUTION EPIDURAL; INTRACAUDAL
Status: COMPLETED | OUTPATIENT
Start: 2021-12-07 | End: 2021-12-07

## 2021-12-07 RX ORDER — PROPOFOL 10 MG/ML
VIAL (ML) INTRAVENOUS CONTINUOUS PRN
Status: DISCONTINUED | OUTPATIENT
Start: 2021-12-07 | End: 2021-12-07 | Stop reason: SURG

## 2021-12-07 RX ORDER — LIDOCAINE HYDROCHLORIDE 10 MG/ML
0.5 INJECTION, SOLUTION EPIDURAL; INFILTRATION; INTRACAUDAL; PERINEURAL ONCE AS NEEDED
Status: DISCONTINUED | OUTPATIENT
Start: 2021-12-07 | End: 2021-12-07 | Stop reason: HOSPADM

## 2021-12-07 RX ADMIN — OXYCODONE HYDROCHLORIDE 10 MG: 5 TABLET ORAL at 19:34

## 2021-12-07 RX ADMIN — PREGABALIN 75 MG: 75 CAPSULE ORAL at 19:36

## 2021-12-07 RX ADMIN — EPHEDRINE SULFATE 10 MG: 50 INJECTION, SOLUTION INTRAVENOUS at 09:35

## 2021-12-07 RX ADMIN — OXYCODONE HYDROCHLORIDE 5 MG: 5 TABLET ORAL at 16:29

## 2021-12-07 RX ADMIN — LIDOCAINE HYDROCHLORIDE 2 ML: 20 INJECTION, SOLUTION INFILTRATION; PERINEURAL at 08:53

## 2021-12-07 RX ADMIN — DEXMEDETOMIDINE 100 MCG: 100 INJECTION, SOLUTION, CONCENTRATE INTRAVENOUS at 09:03

## 2021-12-07 RX ADMIN — BUPIVACAINE HYDROCHLORIDE 20 ML: 2.5 INJECTION, SOLUTION EPIDURAL; INFILTRATION; INTRACAUDAL at 09:03

## 2021-12-07 RX ADMIN — ACETAMINOPHEN 975 MG: 325 TABLET, FILM COATED ORAL at 19:35

## 2021-12-07 RX ADMIN — TRANEXAMIC ACID 1000 MG: 100 INJECTION, SOLUTION INTRAVENOUS at 09:30

## 2021-12-07 RX ADMIN — VANCOMYCIN HYDROCHLORIDE 1250 MG: 500 INJECTION, POWDER, LYOPHILIZED, FOR SOLUTION INTRAVENOUS at 07:47

## 2021-12-07 RX ADMIN — BUPIVACAINE HYDROCHLORIDE 30 ML: 5 INJECTION, SOLUTION EPIDURAL; INTRACAUDAL; PERINEURAL at 08:56

## 2021-12-07 RX ADMIN — ATORVASTATIN CALCIUM 10 MG: 10 TABLET, FILM COATED ORAL at 19:36

## 2021-12-07 RX ADMIN — EPHEDRINE SULFATE 10 MG: 50 INJECTION, SOLUTION INTRAVENOUS at 09:47

## 2021-12-07 RX ADMIN — LIDOCAINE HYDROCHLORIDE 20 MG: 10 INJECTION, SOLUTION INFILTRATION; PERINEURAL at 09:12

## 2021-12-07 RX ADMIN — LIDOCAINE HYDROCHLORIDE 2 ML: 20 INJECTION, SOLUTION INFILTRATION; PERINEURAL at 09:00

## 2021-12-07 RX ADMIN — TRANEXAMIC ACID 1000 MG: 100 INJECTION, SOLUTION INTRAVENOUS at 11:33

## 2021-12-07 RX ADMIN — PREGABALIN 150 MG: 75 CAPSULE ORAL at 07:46

## 2021-12-07 RX ADMIN — BUPIVACAINE HYDROCHLORIDE 10 ML: 2.5 INJECTION, SOLUTION EPIDURAL; INFILTRATION; INTRACAUDAL; PERINEURAL at 08:48

## 2021-12-07 RX ADMIN — PHENYLEPHRINE HYDROCHLORIDE 100 MCG: 10 INJECTION INTRAVENOUS at 09:55

## 2021-12-07 RX ADMIN — HYDROMORPHONE HYDROCHLORIDE 0.5 MG: 1 INJECTION, SOLUTION INTRAMUSCULAR; INTRAVENOUS; SUBCUTANEOUS at 12:18

## 2021-12-07 RX ADMIN — LIDOCAINE HYDROCHLORIDE 2 ML: 20 INJECTION, SOLUTION INFILTRATION; PERINEURAL at 08:45

## 2021-12-07 RX ADMIN — SODIUM CHLORIDE 8 ML/HR: 900 INJECTION, SOLUTION INTRAVENOUS at 12:05

## 2021-12-07 RX ADMIN — BUPIVACAINE HYDROCHLORIDE 1.4 ML: 7.5 INJECTION, SOLUTION EPIDURAL; RETROBULBAR at 09:13

## 2021-12-07 RX ADMIN — CETIRIZINE HYDROCHLORIDE 10 MG: 10 TABLET, FILM COATED ORAL at 15:00

## 2021-12-07 RX ADMIN — FAMOTIDINE 20 MG: 10 INJECTION INTRAVENOUS at 07:53

## 2021-12-07 RX ADMIN — EPHEDRINE SULFATE 5 MG: 50 INJECTION, SOLUTION INTRAVENOUS at 09:55

## 2021-12-07 RX ADMIN — ACETAMINOPHEN 975 MG: 325 TABLET, FILM COATED ORAL at 15:00

## 2021-12-07 RX ADMIN — Medication 80 MG: at 09:25

## 2021-12-07 RX ADMIN — KETAMINE HYDROCHLORIDE 20 MG: 10 INJECTION, SOLUTION INTRAMUSCULAR; INTRAVENOUS at 09:26

## 2021-12-07 RX ADMIN — SODIUM CHLORIDE, POTASSIUM CHLORIDE, SODIUM LACTATE AND CALCIUM CHLORIDE 9 ML/HR: 600; 310; 30; 20 INJECTION, SOLUTION INTRAVENOUS at 07:47

## 2021-12-07 RX ADMIN — ONDANSETRON 4 MG: 2 INJECTION INTRAMUSCULAR; INTRAVENOUS at 07:53

## 2021-12-07 RX ADMIN — PROPOFOL 55 MCG/KG/MIN: 10 INJECTION, EMULSION INTRAVENOUS at 09:26

## 2021-12-07 RX ADMIN — POTASSIUM CHLORIDE 10 MEQ: 1500 TABLET, EXTENDED RELEASE ORAL at 15:00

## 2021-12-07 RX ADMIN — MIDAZOLAM HYDROCHLORIDE 1 MG: 1 INJECTION, SOLUTION INTRAMUSCULAR; INTRAVENOUS at 08:36

## 2021-12-07 RX ADMIN — DEXAMETHASONE SODIUM PHOSPHATE 8 MG: 4 INJECTION, SOLUTION INTRAMUSCULAR; INTRAVENOUS at 07:53

## 2021-12-07 RX ADMIN — ACETAMINOPHEN 1000 MG: 500 TABLET, FILM COATED ORAL at 07:46

## 2021-12-07 RX ADMIN — SODIUM CHLORIDE 75 ML/HR: 9 INJECTION, SOLUTION INTRAVENOUS at 15:00

## 2021-12-07 NOTE — ANESTHESIA PROCEDURE NOTES
Peripheral Block      Patient reassessed immediately prior to procedure    Patient location during procedure: pre-op  Start time: 12/7/2021 9:00 AM  Stop time: 12/7/2021 9:03 AM  Reason for block: at surgeon's request and post-op pain management  Performed by  CRNA: Mikey Plunkett CRNA  Preanesthetic Checklist  Completed: patient identified, IV checked, site marked, risks and benefits discussed, surgical consent, monitors and equipment checked, pre-op evaluation and timeout performed  Prep:  Pt Position: supine  Sterile barriers:cap, gloves, gown, mask and sterile barriers  Prep: ChloraPrep  Patient monitoring: blood pressure monitoring, continuous pulse oximetry and EKG  Procedure    Sedation: yes  Performed under: local infiltration  Guidance:ultrasound guided    ULTRASOUND INTERPRETATION. Using ultrasound guidance a 21 G gauge needle was placed in close proximity to the nerve, at which point, under ultrasound guidance anesthetic was injected in the area of the nerve and spread of the anesthesia was seen on ultrasound in close proximity thereto.  There were no abnormalities seen on ultrasound; a digital image was taken; and the patient tolerated the procedure with no complications. Images:still images obtained, printed/placed on chart    Laterality:right  Block Type:iPack  Injection Technique:single-shot  Needle Type:echogenic  Needle Gauge:21 G  Resistance on Injection: none    Medications Used: bupivacaine PF (MARCAINE) injection 0.25%, 20 mL  Med administered at 12/7/2021 9:03 AM      Medications  Comment:dexmedetomidine in block    Post Assessment  Injection Assessment: negative aspiration for heme, no paresthesia on injection and incremental injection  Patient Tolerance:comfortable throughout block  Complications:no

## 2021-12-07 NOTE — ADDENDUM NOTE
Addendum  created 12/07/21 1243 by Mikey Plunkett, CRNA    Review and Sign - Ready for Procedure

## 2021-12-07 NOTE — PLAN OF CARE
Goal Outcome Evaluation:  Plan of Care Reviewed With: patient           Outcome Summary: PT Evaluation Complete: Patient performs supine to/from sit transfers with CGA and sit to/from stand transfers from elevated bed surface with CGA with use of FWW. Patient incontinent of bladder in standing due to residual effects from surgical block. Patient became nauseated and lightheaded while in standing. Unable to initiate gait training at this time. Notified RN. BP assessed following transfers while in supine, 107/64. Patient would benefit from physical therapy to address deficits in functional mobility and LE strength/ROM. Recommend outpatient PT at discharge.

## 2021-12-07 NOTE — PLAN OF CARE
Goal Outcome Evaluation:  Plan of Care Reviewed With: patient           Outcome Summary: OT evaluation completed. Pt managed bed mobility with SBA and sit to stand transfers with CGA. Pt currently needs mod assist for lower body adl's due to pain and limited rom/strength of right knee following surgery. Will follow up in OT for education with compensatory strategies for management of basic adl tasks. Pt states she would prefer outpt services upon discharge. Pt will need a rolling walker.

## 2021-12-07 NOTE — CONSULTS
"[facility] HOSPITALIST CONSULT NOTE    Sigrid Vann, FERMÍN    CHIEF COMPLAINT: consult for renal disease    HISTORY OF PRESENT ILLNESS:  The patient is a 61-year-old female with a H/O CKD stage 3, GERD, HLD, HH, kidney stones/recurrent UTI, possible emphysema who presented postoperatively to [facility] after undergoing R TKA per Dr. Chan today.  Hospitalists are consulted for renal disease.  The patient notes that she is followed outpatient by Dr. Neal with planned follow-up in January.  She currently has sensation in her right leg and is having small amount of pain.  She notes annual CT scans to evaluate for lung cancer that have been negative.  She notes that she was incontinent of bladder right after surgery but is having no urinary symptoms whatsoever today.    Labs today show creatinine 1.31 with CO2 18.6    She denies f/c/headache/rhinorrhea/nasal congestion/lightheadedness/syncopal sensation/cough/soa/n/v/d/chest pain/abdominal pain/recent illness/sick exposures/change in bowel or bladder habits/no weight change/bloody emesis or bloody stools/change in medications or any other new concerns.    Past Medical History:   Diagnosis Date   • Anesthesia     PT STATES SENSITIVE TO AND B/P DROPS   • Arthritis     GENERALIZED   • Bruises easily    • CKD (chronic kidney disease)     Stage 3   • Depression    • Emphysema lung (HCC)     WAS TOLD SHE HAD THIS PER CT SCAN MAY 2018.... NO INHALERS   • Fibroids    • GERD (gastroesophageal reflux disease)    • H/O menorrhagia    • Hiatal hernia    • Hyperlipidemia    • Kidney calculi    • Kidney stone    • Recurrent UTI    • Right knee pain     \"BONE ON BONE\"   • Sjogren's disease (HCC)    • Spinal headache     WITH      Past Surgical History:   Procedure Laterality Date   • BREAST FIBROADENOMA SURGERY Left     4 cysts removed-all benign   • BREAST SURGERY     •  SECTION      x3   • CHOLECYSTECTOMY  2007    LAP   • COLONOSCOPY  " 2018   • CYSTOSCOPY N/A 2016    Procedure: CYSTOSCOPY;  Surgeon: Mikey Aguirre MD;  Location:  [facility] OR;  Service:    • HYSTERECTOMY  2002    Dr. Foster-menorrhagia   • KIDNEY STONE SURGERY     • URETEROSCOPY LASER LITHOTRIPSY WITH STENT INSERTION Left 2016    Procedure: lt URETEROSCOPY LASER LITHOTRIPSY WITH stone basket extraction w/STENT INSERTION;  Surgeon: Hasmukh Krueger MD;  Location[facility] MAIN OR;  Service:    • URETEROSCOPY LASER LITHOTRIPSY WITH STENT INSERTION Left 3/1/2019    Procedure: LEFT URETEROSCOPY LASER LITHOTRIPSY STONE BASKET EXTRACTION STENT;  Surgeon: Hasmukh Krueger MD;  Location[facility] MA[facility] OR;  Service: Urology   • URETEROSCOPY LASER LITHOTRIPSY WITH STENT INSERTION Left 3/15/2019    Procedure: LEFT URETEROSCOPY LASER LITHOTRIPSY WITH STENT INSERTION STONE BASKET EXTRACTION;  Surgeon: Hasmukh Krueger MD;  Location[facility] MA[facility] OR;  Service: Urology   • WISDOM TOOTH EXTRACTION       Family History   Problem Relation Age of Onset   • Heart disease Mother    • Arthritis Mother    • Osteoporosis Mother    • Hypertension Mother    • Hyperlipidemia Mother    • Cancer Father    • Hodgkin's lymphoma Father    • Heart disease Maternal Grandmother    • Heart disease Maternal Grandfather    • Heart disease Paternal Grandmother    • Kidney disease Paternal Grandmother    • Heart disease Paternal Grandfather    • Kidney disease Paternal Grandfather    • Malig Hyperthermia Neg Hx      Social History     Tobacco Use   • Smoking status: Former Smoker     Packs/day: 0.50     Years: 43.00     Pack years: 21.50     Types: Cigarettes     Quit date: 2017     Years since quittin.2   • Smokeless tobacco: Never Used   Vaping Use   • Vaping Use: Never used   Substance Use Topics   • Alcohol use: No   • Drug use: No     Medications Prior to Admission   Medication Sig Dispense Refill Last Dose   • acetaminophen (TYLENOL) 500 MG tablet Take 1,000 mg by mouth Every 6 (Six) Hours As  "Needed for Mild Pain .   12/6/2021 at Unknown time   • ascorbic acid (VITAMIN C) 1000 MG tablet Take 1,000 mg by mouth Every Morning.   Past Week at Unknown time   • atorvastatin (LIPITOR) 10 MG tablet Take 10 mg by mouth Every Night.   12/6/2021 at Unknown time   • Calcium Carbonate (CALCARB 600 PO) Take 600 mg by mouth 2 (Two) Times a Day.   Past Week at Unknown time   • Multiple Vitamins-Minerals (MULTI-YAHIR PO) Take 1 tablet by mouth Every Evening.   Past Week at Unknown time   • omeprazole (priLOSEC) 20 MG capsule Take 20 mg by mouth Daily As Needed.   12/7/2021 at 0400   • potassium citrate (UROCIT-K) 10 MEQ (1080 MG) CR tablet Take 20 mEq by mouth Every Evening.   12/6/2021 at Unknown time   • VITAMIN D, CHOLECALCIFEROL, PO Take 1,000 Units by mouth Every Morning.   Past Week at Unknown time   • loratadine (CLARITIN) 10 MG tablet Take 10 mg by mouth Daily As Needed.   Unknown at Unknown time     Allergies:  Morphine and related, Nickel, and Nsaids    Immunization History   Administered Date(s) Administered   • COVID-19 (MODERNA) 1st, 2nd, 3rd Dose Only 01/11/2021, 02/08/2021       REVIEW OF SYSTEMS:  Please see the above history of present illness for pertinent positives and negatives.  The remainder of the patient's systems have been reviewed and are negative.     Vital Signs  Temp:  [97.3 °F (36.3 °C)-98.2 °F (36.8 °C)] 97.3 °F (36.3 °C)  Heart Rate:  [53-87] 66  Resp:  [13-20] 14  BP: ()/(46-87) 98/68   Body mass index is 29.32 kg/m².    Flowsheet Rows      First Filed Value   Admission Height 172.7 cm (68\") Documented at 12/02/2021 0930   Admission Weight 86.2 kg (190 lb) Documented at 12/02/2021 0930             Physical Exam  Vitals reviewed.   Constitutional:       General: She is not in acute distress.     Appearance: She is not ill-appearing.   HENT:      Head: Normocephalic and atraumatic.      Mouth/Throat:      Mouth: Mucous membranes are moist.   Eyes:      Extraocular Movements: " Extraocular movements intact.      Pupils: Pupils are equal, round, and reactive to light.   Cardiovascular:      Rate and Rhythm: Normal rate and regular rhythm.   Pulmonary:      Effort: Pulmonary effort is normal. No respiratory distress.      Breath sounds: Normal breath sounds. No wheezing or rales.   Abdominal:      General: Abdomen is flat. Bowel sounds are normal. There is no distension.      Palpations: Abdomen is soft.      Tenderness: There is no abdominal tenderness. There is no guarding.   Musculoskeletal:         General: No swelling.      Comments: RLE in Ace and immobilizer with CMS intact to right foot   Skin:     General: Skin is warm and dry.      Capillary Refill: Capillary refill takes less than 2 seconds.      Findings: No erythema.   Neurological:      General: No focal deficit present.      Mental Status: She is alert and oriented to person, place, and time.   Psychiatric:         Mood and Affect: Mood normal.         Behavior: Behavior normal.       Results Review:    I reviewed the patient's new clinical results.  Lab Results (most recent)     Procedure Component Value Units Date/Time    Basic Metabolic Panel [[id number]]  (Abnormal) Collected: 12/07/21 0710    Specimen: Blood Updated: 12/07/21 0831     Glucose 109 mg/dL      BUN 18 mg/dL      Creatinine 1.31 mg/dL      Sodium 140 mmol/L      Potassium 4.1 mmol/L      Chloride 109 mmol/L      CO2 18.6 mmol/L      Calcium 9.1 mg/dL      eGFR Non African Amer 41 mL/min/1.73      BUN/Creatinine Ratio 13.7     Anion Gap 12.4 mmol/L     Narrative:      GFR Normal >60  Chronic Kidney Disease <60  Kidney Failure <15      aPTT [[id number]]  (Normal) Collected: 12/07/21 0710    Specimen: Blood Updated: 12/07/21 0754     PTT 25.7 seconds     Narrative:      PTT = The equivalent PTT values for the therapeutic range of heparin levels at 0.1 to 0.7 U/ml are 53 to 110 seconds.      Protime-INR [[id number]]  (Normal) Collected: 12/07/21 0710    Specimen:  Blood Updated: 12/07/21 0754     Protime 13.4 Seconds      INR 1.00    Narrative:      Therapeutic Ranges for INR: 2.0-3.0 (PT 20-30)                              2.5-3.5 (PT 25-34)    CBC & Differential [[id number]]  (Abnormal) Collected: 12/07/21 0710    Specimen: Blood Updated: 12/07/21 0751    Narrative:      The following orders were created for panel order CBC & Differential.  Procedure                               Abnormality         Status                     ---------                               -----------         ------                     CBC Auto Differential[[id number]]        Abnormal            Final result                 Please view results for these tests on the individual orders.    CBC Auto Differential [[id number]]  (Abnormal) Collected: 12/07/21 0710    Specimen: Blood Updated: 12/07/21 0751     WBC 4.42 10*3/mm3      RBC 4.17 10*6/mm3      Hemoglobin 13.0 g/dL      Hematocrit 39.8 %      MCV 95.4 fL      MCH 31.2 pg      MCHC 32.7 g/dL      RDW 13.2 %      RDW-SD 46.8 fl      MPV 10.7 fL      Platelets 207 10*3/mm3      Neutrophil % 40.8 %      Lymphocyte % 37.6 %      Monocyte % 16.7 %      Eosinophil % 3.8 %      Basophil % 0.9 %      Immature Grans % 0.2 %      Neutrophils, Absolute 1.80 10*3/mm3      Lymphocytes, Absolute 1.66 10*3/mm3      Monocytes, Absolute 0.74 10*3/mm3      Eosinophils, Absolute 0.17 10*3/mm3      Basophils, Absolute 0.04 10*3/mm3      Immature Grans, Absolute 0.01 10*3/mm3      nRBC 0.0 /100 WBC           Imaging Results (Most Recent)     Procedure Component Value Units Date/Time    XR Knee 1 or 2 View Right [[id number]] Collected: 12/07/21 1218     Updated: 12/07/21 1221    Narrative:      POSTOP RIGHT KNEE SERIES, 12/07/2021:     HISTORY:  Postop knee arthroplasty surgery.     TECHNIQUE:  AP and crosstable lateral radiographs of the right knee were obtained  portably following surgery.     FINDINGS:  Total knee arthroplasty components are well-positioned  postoperatively.  No visible fracture or dislocation.       Impression:      Satisfactory postoperative appearance following right total knee  arthroplasty.     This report was finalized on 12/7/2021 12:19 PM by Dr. Boom Quigley MD.       US Sonosite Portable [[id number]] Resulted: 12/07/21 0708     Updated: 12/07/21 0708    Narrative:      This procedure was auto-finalized with no dictation required.        reviewed    ECG/EMG Results (most recent)     None        n/a    Assessment/Plan   CKD stage 3:  Baseline creatinine 1.1 to 1.2, currently 1.31  Follows outpatient with renal, nothing acute currently  Recheck in am    HLD: no acute issues on home lipitor    GERD/HH: no acute issues on protonix, substituted for home omeprazole    H/O recurrent UTIs/kidney stones: nothing acute currently  Follows outpatient with urology, no current acute issues    Possible emphysema: diagnosed by CT, no PFTs, no inhalers, no acute issues    Thank you for allowing us to participate in the care of your patient, we will follow along with you.    I discussed the patient's findings and my recommendations with patient, spouse and staff.     Aleshia Peters, FERMÍN  12/07/21  16:33 EST

## 2021-12-07 NOTE — OP NOTE
Date of Surgery: 12/7/2021    Preoperative diagnosis: right knee osteoarthritis    Postoperative diagnosis: right knee osteoarthritis    Procedure:  1.right total knee arthroplasty    Surgeon: Dustin Khalil.: Joseph Beth CSA-retraction, irrigation, closure, dressing application    spinal with regional block    Estimated blood loss: <500ml    Fluids: per anesthesia    Specimens: None    Complications: None    Drains: None    Tourniquet time: Less than 2 hours at 250 mmHg    Implants used: Sara Persona nickel free total knee arthroplasty system with a size 32 patella, size D tibia, size 8 cruciate retaining femoral component , 12 mm highly cross-linked medial congruent polyethylene insert, cement    Examination under anesthesia: right knee passive range of motion 3-120°, varus alignment, no open wounds lacerations or abrasions over knee, stable to varus and valgus stress at 3 and 30°, 2+ dorsalis pedis pulse.    Indications for procedure: Patient is a pleasant 61 y.o. female who has had significant pain to right knee over the last several years, has failed conservative treatment with intra-articular injections, bracing, home exercise program, activity modification, anti-inflammatory medications. Has had persistent pain limiting activities of daily living and even has had pain at rest. We discussed treatment options and patient wished to proceed with above-mentioned surgery. Was explained details of procedure as well as risks benefits and alternatives as documented on history and physical and had all questions answered. No guarantees were given in regards to results of the surgery.    Details of procedure: Patient was seen, evaluated, and cleared for surgery by anesthesia. Had been medically optimized by primary care physician. Patient was met in the preoperative hold area, operative site was marked, consent was reviewed, history and physical was updated, and preoperative labs were reviewed. Regional block and  spinal were placed per anesthesia and patient was taken to the operating room and placed in supine position on a regular OR table. Examination under anesthesia was carried out this time. Nonsterile tourniquet was then applied to right lower extremity. Patient was secured to the table with a waist strap and all bony prominences were well-padded. right lower extremity was then sterilely prepped and draped in standard fashion.  Formal timeout was completed including confirmation of history and physical, operative consent, operative site, patient identification number, and preoperative antibiotics administration. right leg was then exsanguinated and tourniquet inflated to 250 mmHg. Procedure was then begun with longitudinal incision over the anterior aspect of the right knee through skin and subcutaneous tissue with 15 blade. Minimal subcutaneous flaps were developed at this point in time, and standard medial parapatellar arthrotomy was then created at this point. Portion of suprapatellar and infrapatellar fat pad were resected this point in time. Medial tibial peel was carried out in order to allow for further exposure the knee. Medial and lateral meniscus were resected this time and ACL was transected.  Patella was then everted and measured with a caliper. Patellar reamer was then used to create initial retropatellar cut followed by completion of cut with a oscillating saw in standard fashion and use of rongeur. Patella was sized as a 32 and patella guard was placed at this time.  Attention was then turned to the distal femur with the knee being brought into a flexed position.  Reference pin for I-Assist navigational system was placed in the distal femur in-line with Whitesides line in retrograde fashion.  Navigational distal femoral cutting block was positioned at this time as well and calibrated with multiple planes of knee and hip motion carried out to set reference positions for navigation device.  Navigational  device was then adjusted to 0 degrees valgus cut on distal femur and 3 degrees flexion cut on distal femur.  Distal femoral cutting block was pinned into position at this site, navigational device removed. Depth of the resection was checked with a sickle and noted to be appropriate. Additional pin was placed for security of the cutting block and oscillating saw was then used to create a distal femoral cut in standard fashion while collateral ligaments were protected with Z retractors. Bone was removed and measured at this time. Pins and cutting block were removed as well. Contents of the notch were then resected including the ACL, PCL, and posterior horns of medial and lateral meniscus. Posterior retractor was then placed and tibia was subluxated anteriorly.   Attention was then turned to the proximal tibia. Additional release of lateral tissues was completed at this time to allow for appropriate visualization of the proximal tibia articular surface.  Given failure of the I-Assist navigational device after use on the femur, we proceeded with standard use of extra medullary jig for the tibia.  Tibial block was then pinned in position in line with the tibial crest and center of the ankle joint. Tibial cutting block resection depth was set with a sickle, block was pinned into position at this time, and alignment assessed with the drop vashti noted to be in line with tibial crest, medial third of tibial tubercle, and center of the ankle joint.  Oscillating saw was then used to complete the proximal tibial cut while collateral ligaments were protected with Z retractors. Bone fragments were removed and measured. Knee was brought into full extension with extension gap being checked with spacer block at this time and noted be acceptable with knee brought into full extension, stable medial and lateral collateral stability at full extension.   Attention was then returned to the distal femur with a femoral sizing guide being  placed, transverse epicondylar axis and Whitesides line being assessed and used to determine appropriate external rotation of 3 degrees.  Femoral sizing guide was secured to the distal femur with 2 screws, sizing caliper was adjusted to the anterior femur and femur was sized at a 8.  2 drill holes were made through the sizing guide which was then removed including 2 screws. Size 8 femoral cutting block was then impacted onto the distal femoral cut surface and pinned into position. Sickle was used to check the anterior cut and there was no evidence of anticipated notching. Collateral ligaments were protected with Z retractors while anterior, posterior, and chamfer cuts were created in standard fashion with oscillating saw. Femoral cutting block was removed at this time as well as bone fragments. Posterior capsule was stripped at this time. Size 8 femoral trial was placed. Size D tibial baseplate trial with 12 mm polyethylene trial was inserted. Knee was then ranged from 0-125°, good varus valgus stability at full extension and 30° as well as throughout mid flexion with good AP translation at 90° of flexion noted.  Patella was everted at this point in time, 32 mm patella reaming guide was placed and lug holes were drilled for patella at this point. Patella trial was placed and patella was noted to track in midline with no evidence of subluxation. Knee was ranged from full extension to full flexion and tibial rotation was noted with midline of tibial trial being marked with Bovie electrocautery at the proximal tibia. Femoral and patellar and tibial trials were removed at this point in time and tibia was subluxated anteriorly with posterior retractor. Tibial trial baseplate was placed once again, position confirmed with drop vashti as well as with previous marking for tibial rotation and assessing for bony coverage of implant. Once position of tibial baseplate was noted to be appropriate, 2 pins were placed at this time,  and reamer and punch were then used through the tibial baseplate.  All trial components were once again removed at this time and pulsatile lavage was used to thoroughly clean all bony cut surfaces as well as subcutaneous tissue. Final implants were opened on the back table this time. Cement was prepared on the back table and was applied to the cut surfaces of the distal femur, proximal tibia, and patella as well as to the backside of the implants. Tibial component was placed first followed by distal femur followed by patella. Extraneous cement was removed with freer at this time. Once all implants were in position knee was brought into full extension with axial compression to allow for cement to cure fully.  Once cement was completely hardened, trial polyethylene insert was assessed, range of motion of knee from 0-125° was achieved with good varus and valgus stability throughout range of motion and good AP translation at 90° of flexion. Thus a 12 mm polyethylene insert was opened on the back table, inserted and locked in appropriately into the tibial baseplate. Knee was thoroughly irrigated with a second evaluation for any additional bone fragments or cement particles. Knee was then thoroughly irrigated with Betadine solution followed by pulsatile lavage.   Attention was then turned to closure of the wound with running self locking #2 suture ×1, 2-0 Vicryls in inverted fashion for deep subcutaneous closure, 2-0 running self locking strata-fix suture, and Prineo glue and mesh for skin. Wound was dressed with Telfa, 4 x 4 gauze, web roll, ABD pad, Ace wrap, and patient was placed in knee immobilizer and given ice pack. At the end of procedure all lap, needle and sponge counts were correct ×2. Patient had brisk cap refill all digits right lower extremity, compartments are soft and easily compressible at the end of the procedure.    Disposition: Patient was taken to recovery room in stable condition. Will be admitted  for pain control and initiation of therapy, weight-bear as tolerated right lower extremity, DVT prophylaxis will be started on postoperative day #1 with Eliquis. Results of the procedure were discussed immediately postoperatively with patient's family and they had all questions answered at that time.

## 2021-12-07 NOTE — ANESTHESIA PREPROCEDURE EVALUATION
Anesthesia Evaluation     history of anesthetic complications (history of spinal headache, BP dropped with lithotripsy):  NPO Solid Status: N/A  NPO Liquid Status: > 2 hours           Airway   Mallampati: I  TM distance: >3 FB  Neck ROM: full  No difficulty expected  Dental      Comment: Partial out    Pulmonary - normal exam    breath sounds clear to auscultation  (+) a smoker (quit 8/21/2017) Former, COPD (noted on CT, no meds) mild,   Sleep apnea: snores.  Cardiovascular - normal exam  Exercise tolerance: good (4-7 METS)    ECG reviewed  Rhythm: regular  Rate: normal    (+) hyperlipidemia,       Neuro/Psych  (+) numbness (occ hands or feet tingle),     GI/Hepatic/Renal/Endo    (+)  GERD well controlled,  renal disease (stage 3) CRI and stones,     Musculoskeletal     (+) back pain, radiculopathy (hips and legs) Left lower extremity and Right lower extremity  Abdominal  - normal exam   Substance History      OB/GYN negative ob/gyn ROS         Other   arthritis (right knee), autoimmune disease (dry mouth) Sjogren syndrome,      ROS/Med Hx Other: gatorade 0400                Anesthesia Plan    ASA 2     regional, spinal and MAC   (Procedures and risks of spinal, ACB with catheter/IPACK/ONDINA discussed with patient and spouse.  Accepted.)  intravenous induction     Anesthetic plan, all risks, benefits, and alternatives have been provided, discussed and informed consent has been obtained with: patient and spouse/significant other.  Use of blood products discussed with patient and spouse/significant other  Consented to blood products.

## 2021-12-07 NOTE — ANESTHESIA PROCEDURE NOTES
Peripheral Block      Patient reassessed immediately prior to procedure    Patient location during procedure: pre-op  Start time: 12/7/2021 8:53 AM  Stop time: 12/7/2021 8:56 AM  Reason for block: at surgeon's request and post-op pain management  Performed by  CRNA: Mikey Plunkett CRNA  Preanesthetic Checklist  Completed: patient identified, IV checked, site marked, risks and benefits discussed, surgical consent, monitors and equipment checked, pre-op evaluation and timeout performed  Prep:  Pt Position: supine  Sterile barriers:cap, gloves, gown, mask and sterile barriers  Prep: ChloraPrep  Patient monitoring: blood pressure monitoring, continuous pulse oximetry and EKG  Procedure    Sedation: yes  Performed under: local infiltration  Guidance:ultrasound guided    ULTRASOUND INTERPRETATION. Using ultrasound guidance a 21 G gauge needle was placed in close proximity to the nerve, at which point, under ultrasound guidance anesthetic was injected in the area of the nerve and spread of the anesthesia was seen on ultrasound in close proximity thereto.  There were no abnormalities seen on ultrasound; a digital image was taken; and the patient tolerated the procedure with no complications. Images:still images obtained, printed/placed on chart    Laterality:right  Block Type:field  Injection Technique:single-shot  Needle Type:echogenic  Needle Gauge:21 G  Resistance on Injection: none    Medications Used: bupivacaine PF (MARCAINE) injection 0.5%, 30 mL  Med administered at 12/7/2021 8:56 AM      Medications  Comment:dexmedetomidine in block    Post Assessment  Injection Assessment: negative aspiration for heme, no paresthesia on injection and incremental injection  Patient Tolerance:comfortable throughout block  Complications:no

## 2021-12-07 NOTE — THERAPY EVALUATION
"Patient Name: Jennifer Arndt  : 1960    MRN: [id number]                              Today's Date: 2021       Admit Date: 2021    Visit Dx:     ICD-10-CM ICD-9-CM   1. Primary osteoarthritis of right knee  M17.11 715.16     Patient Active Problem List   Diagnosis   • Acute pyelonephritis   • Left ureteral stone   • Abnormal serum total protein level   • Primary osteoarthritis of right knee   • Pain     Past Medical History:   Diagnosis Date   • Anesthesia     PT STATES SENSITIVE TO AND B/P DROPS   • Arthritis     GENERALIZED   • Bruises easily    • CKD (chronic kidney disease)     Stage 3   • Depression    • Emphysema lung (HCC)     WAS TOLD SHE HAD THIS PER CT SCAN MAY 2018.... NO INHALERS   • Fibroids    • GERD (gastroesophageal reflux disease)    • H/O menorrhagia    • Hiatal hernia    • Hyperlipidemia    • Kidney calculi    • Kidney stone    • Recurrent UTI    • Right knee pain     \"BONE ON BONE\"   • Sjogren's disease (HCC)    • Spinal headache     WITH      Past Surgical History:   Procedure Laterality Date   • BREAST FIBROADENOMA SURGERY Left     4 cysts removed-all benign   • BREAST SURGERY     •  SECTION      x3   • CHOLECYSTECTOMY  2007    LAP   • COLONOSCOPY  2018   • CYSTOSCOPY N/A 2016    Procedure: CYSTOSCOPY;  Surgeon: Mikey Aguirre MD;  Location: [facility];  Service:    • HYSTERECTOMY      Dr. Foster-menorrhagia   • KIDNEY STONE SURGERY     • URETEROSCOPY LASER LITHOTRIPSY WITH STENT INSERTION Left 2016    Procedure: lt URETEROSCOPY LASER LITHOTRIPSY WITH stone basket extraction w/STENT INSERTION;  Surgeon: Hasmukh Krueger MD;  Location: [facility];  Service:    • URETEROSCOPY LASER LITHOTRIPSY WITH STENT INSERTION Left 3/1/2019    Procedure: LEFT URETEROSCOPY LASER LITHOTRIPSY STONE BASKET EXTRACTION STENT;  Surgeon: Hasmukh Krueger MD;  Location: [facility] OR;  Service: Urology   • URETEROSCOPY LASER LITHOTRIPSY WITH STENT " INSERTION Left 3/15/2019    Procedure: LEFT URETEROSCOPY LASER LITHOTRIPSY WITH STENT INSERTION STONE BASKET EXTRACTION;  Surgeon: Hasmukh Krueger MD;  Location: [facility];  Service: Urology   • WISDOM TOOTH EXTRACTION        General Information     Row Name 12/07/21 1405          Physical Therapy Time and Intention    Document Type evaluation  -BP     Mode of Treatment physical therapy  -BP     Row Name 12/07/21 1405          General Information    Patient Profile Reviewed yes  Patient admitted s/p R TKA. Per RN, patient stable to participate in PT evaluation.  -BP     Prior Level of Function independent:; gait; transfer; all household mobility; community mobility; bed mobility; ADL's; driving; work  Patient reports independence with all mobility and ADL's without use of an AD.  -BP     Existing Precautions/Restrictions brace worn when out of bed; fall  Knee immobilizer required with mobility  -BP     Barriers to Rehab none identified  -BP     Row Name 12/07/21 1405          Living Environment    Lives With spouse  -BP     Row Name 12/07/21 1405          Home Main Entrance    Number of Stairs, Main Entrance other (see comments)  14  -BP     Stair Railings, Main Entrance railing on right side (ascending)  -BP     Row Name 12/07/21 1405          Stairs Within Home, Primary    Stairs, Within Home, Primary one story home with 14 stairs to enter  -BP     Row Name 12/07/21 1405          Cognition    Orientation Status (Cognition) oriented x 4  -BP     Row Name 12/07/21 1405          Safety Issues, Functional Mobility    Comment, Safety Issues/Impairments (Mobility) WFL  -BP           User Key  (r) = Recorded By, (t) = Taken By, (c) = Cosigned By    Initials Name Provider Type    BP Ariadna Hahn, PT Physical Therapist               Mobility     Row Name 12/07/21 1410          Bed Mobility    Bed Mobility supine-sit; sit-supine  Simultaneous filing. User may be unaware of other data.  -BP     Supine-Sit  [place] (Bed Mobility) contact guard  Simultaneous filing. User may be unaware of other data.  -BP     Sit-Supine [place] (Bed Mobility) contact guard  Simultaneous filing. User may be unaware of other data.  -BP     Assistive Device (Bed Mobility) bed rails; head of bed elevated  Simultaneous filing. User may be unaware of other data.  -BP     Row Name 12/07/21 1410          Transfers    Comment (Transfers) Verbal cues for hand placement. Patient stood x 2 from elevated bed surface with CGA. Patient noted to be incontinent of urine due to surgical blocks. While in standing patient became lightheaded and nauseated. Notified RN.  -BP     Row Name 12/07/21 1410          Sit-Stand Transfer    Sit-Stand [place] (Transfers) contact guard; verbal cues  Simultaneous filing. User may be unaware of other data.  -BP     Assistive Device (Sit-Stand Transfers) walker, front-wheeled  Simultaneous filing. User may be unaware of other data.  -BP     Row Name 12/07/21 1410          Gait/Stairs (Locomotion)    Comment (Gait/Stairs) deferred, due to patient reports of nausea and lightheadedness. patient incontinent of urine due to persistent effects from surgical block.  -BP           User Key  (r) = Recorded By, (t) = Taken By, (c) = Cosigned By    Initials Name Provider Type    BP Ariadna Hahn, PT Physical Therapist               Obj/Interventions     Row Name 12/07/21 1417 12/07/21 1414       Range of Motion Comprehensive    Comment, General Range of Motion L LE AROM WFL. R ankle AROM WFL. R knee not assessed  -BP --  -BP    Row Name 12/07/21 1417          Strength Comprehensive (MMT)    Comment, General Manual Muscle Testing (MMT) Assessment R LE gross MMT 5/5, L LE strength not assessed. Patient able to complete SLR with brace on independently.  -BP     Row Name 12/07/21 1417          Balance    Comment, Balance CGA for static sitting balance, CGA for static standing.  -BP     Row Name 12/07/21 1417 12/07/21  1414       Sensory Assessment (Somatosensory)    Sensory Assessment (Somatosensory) other (see comments)  Patient incontinent of urine from surgical block  -BP --  -BP          User Key  (r) = Recorded By, (t) = Taken By, (c) = Cosigned By    Initials Name Provider Type    BP Ariadna Hahn, PT Physical Therapist               Goals/Plan     Row Name 12/07/21 1424          Bed Mobility Goal 1 (PT)    Activity/Assistive Device (Bed Mobility Goal 1, PT) bed mobility activities, all  -BP     [place] Level/Cues Needed (Bed Mobility Goal 1, PT) supervision required  -BP     Time Frame (Bed Mobility Goal 1, PT) 2 days  -BP     Progress/Outcomes (Bed Mobility Goal 1, PT) goal ongoing  -BP     Row Name 12/07/21 1424          Transfer Goal 1 (PT)    Activity/Assistive Device (Transfer Goal 1, PT) transfers, all; walker, rolling  -BP     [place] Level/Cues Needed (Transfer Goal 1, PT) supervision required  -BP     Time Frame (Transfer Goal 1, PT) 2 days  -BP     Progress/Outcome (Transfer Goal 1, PT) goal ongoing  -BP     Row Name 12/07/21 1424          Gait Training Goal 1 (PT)    Activity/Assistive Device (Gait Training Goal 1, PT) gait (walking locomotion); walker, rolling  -BP     [place] Level (Gait Training Goal 1, PT) supervision required  -BP     Distance (Gait Training Goal 1, PT) 100  -BP     Time Frame (Gait Training Goal 1, PT) 2 days  -BP     Progress/Outcome (Gait Training Goal 1, PT) goal ongoing  -BP     Row Name 12/07/21 1424          Stairs Goal 1 (PT)    Activity/Assistive Device (Stairs Goal 1, PT) ascending stairs; descending stairs; using handrail, right  -BP     Number of Stairs (Stairs Goal 1, PT) 14  -BP     Time Frame (Stairs Goal 1, PT) 2 days  -BP     Progress/Outcome (Stairs Goal 1, PT) goal ongoing  -BP           User Key  (r) = Recorded By, (t) = Taken By, (c) = Cosigned By    Initials Name Provider Type    BP Ariadna Hahn, PT Physical Therapist               Clinical  Impression     Row Name 12/07/21 3780          Plan of Care Review    Plan of Care Reviewed With patient  -BP     Outcome Summary PT Evaluation Complete: Patient performs supine to/from sit transfers with CGA and sit to/from stand transfers from elevated bed surface with CGA with use of FWW. Patient incontinent of bladder in standing due to residual effects from surgical block. Patient became nauseated and lightheaded while in standing. Unable to initiate gait training at this time. Notified RN. BP assessed following transfers while in supine, 107/64. Patient would benefit from physical therapy to address deficits in functional mobility and LE strength/ROM. Recommend outpatient PT at discharge.  -BP     Row Name 12/07/21 9831          Therapy Assessment/Plan (PT)    Rehab Potential (PT) good, to achieve stated therapy goals  -BP     Criteria for Skilled Interventions Met (PT) yes; meets criteria  -BP     Predicted Duration of Therapy Intervention (PT) 2 days  -BP     Row Name 12/07/21 4096          Positioning and Restraints    Pre-Treatment Position in bed  -BP     Post Treatment Position bed  -BP     In Bed notified nsg; supine; call light within reach; encouraged to call for assist; with nsg  -BP           User Key  (r) = Recorded By, (t) = Taken By, (c) = Cosigned By    Initials Name Provider Type    BP Ariadna Hahn, PT Physical Therapist               Outcome Measures     Row Name 12/07/21 0412          How much help from another person do you currently need...    Turning from your back to your side while in flat bed without using bedrails? 3  -BP     Moving from lying on back to sitting on the side of a flat bed without bedrails? 3  -BP     Moving to and from a bed to a chair (including a wheelchair)? 3  -BP     Standing up from a chair using your arms (e.g., wheelchair, bedside chair)? 3  -BP     Climbing 3-5 steps with a railing? 2  -BP     To walk in hospital room? 2  -BP     AM-PAC 6 Clicks Score (PT)  16  -BP     Row Name 12/07/21 1426 12/07/21 1425       Functional Assessment    Outcome Measure Options AM-PAC 6 Clicks Daily Activity (OT)  -SD AM-PAC 6 Clicks Basic Mobility (PT)  -BP          User Key  (r) = Recorded By, (t) = Taken By, (c) = Cosigned By    Initials Name Provider Type    SD Sidney Villanueva, OTR Occupational Therapist    Ariadna Hills, YEVGENIY Physical Therapist                             Physical Therapy Education                 Title: PT OT SLP Therapies (In Progress)     Topic: Physical Therapy (In Progress)     Point: Mobility training (Done)     Learning Progress Summary           Patient Acceptance, E,TB, VU by BP at 12/7/2021 1425                   Point: Home exercise program (Not Started)     Learner Progress:  Not documented in this visit.                      User Key     Initials Effective Dates Name Provider Type Discipline     06/16/21 -  Ariadna Hahn, YEVGENIY Physical Therapist PT              PT Recommendation and Plan  Planned Therapy Interventions (PT): balance training, bed mobility training, gait training, home exercise program, patient/family education, stair training, strengthening, stretching, transfer training  Plan of Care Reviewed With: patient  Outcome Summary: PT Evaluation Complete: Patient performs supine to/from sit transfers with CGA and sit to/from stand transfers from elevated bed surface with CGA with use of FWW. Patient incontinent of bladder in standing  due to residual effects from surgical block. Patient became nauseated and lightheaded while in standing. Unable to initiate gait training at this time. Notified RN. BP assessed following transfers while in supine, 107/64. Patient would benefit from physical therapy to address deficits in functional mobility and LE strength/ROM. Recommend outpatient PT at discharge.     Time Calculation:    PT Charges     Row Name 12/07/21 1427             Time Calculation    Start Time 1335  -BP      Stop Time 1400  -BP       Time Calculation (min) 25 min  -BP      PT Received On 12/07/21  -BP      PT - Next Appointment 12/08/21  -BP            User Key  (r) = Recorded By, (t) = Taken By, (c) = Cosigned By    Initials Name Provider Type    Ariadna Hills, PT Physical Therapist              Therapy Charges for Today     Code Description Service Date Service Provider Modifiers Qty    [id number] HC PT EVAL LOW COMPLEXITY 2 12/7/2021 Ariadna Hahn, PT GP 1          PT G-Codes  Outcome Measure Options: AM-PAC 6 Clicks Daily Activity (OT)  AM-PAC 6 Clicks Score (PT): 16  AM-PAC 6 Clicks Score (OT): 21    Ariadna Hahn PT  12/7/2021

## 2021-12-07 NOTE — H&P
Orthopedic History & Physical         Patient: Jennifer Arndt     YOB: 1960     Medical Record Number: [id number]     Surgeon:  Dr. John Chan     Chief Complaints: Right knee pain, DJD     Subjective:  This problem is not new to this examiner.      History of Present Illness: 61 y.o. female presents with right knee pain and arthritis. Onset of symptoms was years ago and has been progressively worsening despite more conservative treatment measures.  Symptoms are associated with ability to move, exercise, and perform activities of daily living.  Symptoms are aggravated by weight bearing and ROM necessary for activities of daily living.   Symptoms improve with rest, ice and elevation only minimally.       Allergies:         Allergies   Allergen Reactions   • Morphine Swelling       FELT LIKE THROAT SWELLED, SOME SWALLOWING TROUBLE   • Nickel Swelling   • Nsaids Swelling       STAGE III KIDNEY FAILURE      • Morphine And Related Swelling       FELT LIKE THROAT SWELLED, SOME SWALLOWING TROUBLE  FELT LIKE THROAT SWELLED, SOME SWALLOWING TROUBLE            Medications:   Home Medications:       Current Outpatient Medications on File Prior to Visit   Medication Sig   • atorvastatin (LIPITOR) 10 MG tablet Take 10 mg by mouth Every Night.   • Calcium Carbonate (CALCARB 600 PO) Take 600 mg by mouth 2 (Two) Times a Day.   • loratadine (CLARITIN) 10 MG tablet Take 10 mg by mouth Daily As Needed.   • Multiple Vitamins-Minerals (MULTI-YAHIR PO) Take 1 tablet by mouth Every Evening.   • omeprazole (priLOSEC) 20 MG capsule Take 20 mg by mouth Daily As Needed.   • potassium citrate (UROCIT-K) 10 MEQ (1080 MG) CR tablet Take 10 mEq by mouth Every Evening.   • VITAMIN D, CHOLECALCIFEROL, PO Take 1,000 Units by mouth Every Morning.      No current facility-administered medications on file prior to visit.      Current Medications:  Scheduled Meds:  Continuous Infusions:No current facility-administered medications for  "this visit.     PRN Meds:.     I have reviewed the patient's medical history in detail and updated the computerized patient record.  Review and summarization of old records include:    Medical History        Past Medical History:   Diagnosis Date   • Anesthesia       PT STATES SENSITIVE TO AND B/P DROPS   • Arthritis       GENERALIZED   • Bruises easily     • CKD (chronic kidney disease)       Stage 3   • Depression     • Emphysema lung (CMS/HCC)       WAS TOLD SHE HAD THIS PER CT SCAN MAY 2018.... NO INHALERS   • Fibroids     • GERD (gastroesophageal reflux disease)     • H/O menorrhagia     • Hiatal hernia     • Hyperlipidemia     • Kidney calculi     • Kidney stone     • Recurrent UTI     • Right knee pain       \"BONE ON BONE\"   • Sjogren's disease (CMS/HCC)     • Spinal headache       WITH             Surgical History         Past Surgical History:   Procedure Laterality Date   • BREAST FIBROADENOMA SURGERY Left      4 cysts removed-all benign   • BREAST SURGERY       •  SECTION         x3   • CHOLECYSTECTOMY        LAP   • COLONOSCOPY   2018   • CYSTOSCOPY N/A 2016     Procedure: CYSTOSCOPY;  Surgeon: Mikey Aguirre MD;  Location: [facility];  Service:    • HYSTERECTOMY        Dr. Foster-menorrhagia   • KIDNEY STONE SURGERY       • URETEROSCOPY LASER LITHOTRIPSY WITH STENT INSERTION Left 2016     Procedure: lt URETEROSCOPY LASER LITHOTRIPSY WITH stone basket extraction w/STENT INSERTION;  Surgeon: Hasmukh Krueger MD;  Location: [facility];  Service:    • URETEROSCOPY LASER LITHOTRIPSY WITH STENT INSERTION Left 3/1/2019     Procedure: LEFT URETEROSCOPY LASER LITHOTRIPSY STONE BASKET EXTRACTION STENT;  Surgeon: Hasmukh Krueger MD;  Location: [facility];  Service: Urology   • URETEROSCOPY LASER LITHOTRIPSY WITH STENT INSERTION Left 3/15/2019     Procedure: LEFT URETEROSCOPY LASER LITHOTRIPSY WITH STENT INSERTION STONE BASKET EXTRACTION;  Surgeon: Evans" Hasmukh BHANDARI MD;  Location: [facility];  Service: Urology   • WISDOM TOOTH EXTRACTION                Social History            Occupational History       Employer: RETIRED       Comment: [employer]   Tobacco Use   • Smoking status: Former Smoker       Packs/day: 0.50       Years: 43.00       Pack years: 21.50       Types: Cigarettes       Quit date: 2017       Years since quittin.0   • Smokeless tobacco: Never Used   • Tobacco comment: QUIT 2017   Vaping Use   • Vaping Use: Never used   Substance and Sexual Activity   • Alcohol use: No   • Drug use: No   • Sexual activity: Defer       Partners: Female      Social History          Social History Narrative   • Not on file               Family History   Problem Relation Age of Onset   • Heart disease Mother     • Arthritis Mother     • Osteoporosis Mother     • Hypertension Mother     • Hyperlipidemia Mother     • Cancer Father     • Hodgkin's lymphoma Father     • Heart disease Maternal Grandmother     • Heart disease Maternal Grandfather     • Heart disease Paternal Grandmother     • Kidney disease Paternal Grandmother     • Heart disease Paternal Grandfather     • Kidney disease Paternal Grandfather     • Malig Hyperthermia Neg Hx           ROS: 14 point review of systems was performed and was negative except for documented findings in HPI and today's encounter.      Allergies:         Allergies   Allergen Reactions   • Morphine Swelling       FELT LIKE THROAT SWELLED, SOME SWALLOWING TROUBLE   • Nickel Swelling   • Nsaids Swelling       STAGE III KIDNEY FAILURE      • Morphine And Related Swelling       FELT LIKE THROAT SWELLED, SOME SWALLOWING TROUBLE  FELT LIKE THROAT SWELLED, SOME SWALLOWING TROUBLE         Constitutional:  Denies fever, shaking or chills   Eyes:  Denies change in visual acuity   HENT:  Denies nasal congestion or sore throat   Respiratory:  Denies cough or shortness of breath   Cardiovascular:  Denies chest pain or  "severe LE edema   GI:  Denies abdominal pain, nausea, vomiting, bloody stools or diarrhea   Musculoskeletal:  Denies numbness tingling or loss of motor function except as outlined above in history of present illness.  : Denies painful urination or hematuria  Integument:  Denies rash, lesion or ulceration   Neurologic:  Denies headache or focal weakness  Endocrine:  Denies lymphadenopathy  Psych:  Denies confusion or change in mental status             Hem:  Denies active bleeding     Physical Exam: 61 y.o. female  Vitals:    12/02/21 0930 12/07/21 0711 12/07/21 0729   BP:   119/70   BP Location:   Right arm   Patient Position:   Lying   Pulse:   66   Resp:   19   Temp:  98.2 °F (36.8 °C)    TempSrc:  Oral    SpO2:   98%   Weight: 86.2 kg (190 lb) 87.5 kg (192 lb 12.8 oz)    Height: 172.7 cm (68\") 172.7 cm (68\")          Vital signs reviewed.   General Appearance:    Alert, cooperative, in no acute distress                  Eyes: conjunctiva clear  ENT: external ears and nose atraumatic  CV: no peripheral edema  Resp: normal respiratory effort  Skin: no rashes or wounds; normal turgor  Psych: mood and affect appropriate  Lymph: no nodes appreciated  Neuro: gross sensation intact  Vascular:  Palpable peripheral pulse in noted extremity  Musculoskeletal Extremities: Right Knee-  Slight varus alignment.   ROM 0 to 130 degrees  4+/5 on flexion  4+/5 on extension  Maximal tenderness at medial joint line     Effusion- Moderate  Grade 1A Lachman  Anterior drawer- Negative  Posterior drawer- Negative  Stable opening on varus and valgus stress at 0 and 30     Log roll-  Negative, but mild pain over the lateral hip  Stinchfield-  Negative, but mild pain over the lateral hip     Maura- Moderately positive, medial joint line.     Positive sensation light touch all distributions symmetric to contralateral side  Brisk cap refill all digits  1+ dorsalis pedis pulse     Debilities/Disabilities Identified: None     "         Assessment:      Patient Active Problem List   Diagnosis   • Acute pyelonephritis   • Left ureteral stone   • Abnormal serum total protein level   • Primary osteoarthritis of right knee   • Pain         Plan:  I reviewed anatomy of a total joint arthroplasty in laymen's terms, as well as typical postoperative recovery and possibly 6-12 months for maximal recovery, and possible need for rehabilitation stay after hospitalization. We also discussed risks, benefits, alternatives, and limitations of procedure with risks including but not limited to neurovascular damage, bleeding, infection, malalignment, chronic pian, failure of implants, osteolysis, loosening of implants, loss of motion, weakness, stiffness, instability, DVT, pulmonary embolus, death, stroke, complex regional pain syndrome, myocardial infarction, and need for additional procedures. No guarantees were given regarding results of surgery.       Jennifer Arndt was given the opportunity to ask and have all questions answered today.  The patient voiced understanding of the risks, benefits, and alternative forms of treatment that were discussed and the patient consents to proceed with surgery.      Patient's blood clot history is negative.     Plan for DVT prophylaxis is Eliquis given underlying kidney failure     Patient's MRSA infection history is negative.     Patient's skin infection history is negative.     Patient does have a nickel allergy     Discharge Plan: POD 2-3 to home     Date: 12/7/2021     Dictated utilizing Dragon dictation

## 2021-12-07 NOTE — ANESTHESIA POSTPROCEDURE EVALUATION
Patient: Jennifer Arndt    Procedure Summary     Date: 12/07/21 Room / Location:  [facility]    Anesthesia Start: 0924 Anesthesia Stop: 1147    Procedure: TOTAL KNEE ARTHROPLASTY AND ALL ASSOCIATED PROCEDURES (Right Knee) Diagnosis:       Primary osteoarthritis of right knee      (Primary osteoarthritis of right knee [M17.11])    Surgeons: John Chan MD Provider: Mikey Plunkett CRNA    Anesthesia Type: regional, spinal, MAC ASA Status: 2          Anesthesia Type: regional, spinal, MAC    Vitals  Vitals Value Taken Time   /76 12/07/21 1235   Temp 97.3 °F (36.3 °C) 12/07/21 1143   Pulse 63 12/07/21 1242   Resp 20 12/07/21 1155   SpO2 93 % 12/07/21 1242   Vitals shown include unvalidated device data.        Post Anesthesia Care and Evaluation    Patient location during evaluation: bedside  Patient participation: complete - patient participated  Level of consciousness: awake and alert  Pain score: 0  Pain management: adequate  Airway patency: patent  Anesthetic complications: No anesthetic complications  PONV Status: none  Cardiovascular status: acceptable  Respiratory status: acceptable  Hydration status: acceptable  Post Neuraxial Block status: Motor and sensory function returned to baseline and No signs or symptoms of PDPH

## 2021-12-07 NOTE — ANESTHESIA PROCEDURE NOTES
Peripheral Block      Patient reassessed immediately prior to procedure    Patient location during procedure: pre-op  Start time: 12/7/2021 8:45 AM  Stop time: 12/7/2021 8:48 AM  Reason for block: at surgeon's request and post-op pain management  Performed by  CRNA: Mikey Plunkett CRNA  Preanesthetic Checklist  Completed: patient identified, IV checked, site marked, risks and benefits discussed, surgical consent, monitors and equipment checked, pre-op evaluation and timeout performed  Prep:  Pt Position: supine  Sterile barriers:cap, gloves, gown, mask and sterile barriers  Prep: ChloraPrep  Patient monitoring: blood pressure monitoring, continuous pulse oximetry and EKG  Procedure    Sedation: yes  Performed under: local infiltration  Guidance:ultrasound guided    ULTRASOUND INTERPRETATION. Using ultrasound guidance a 21 G gauge needle was placed in close proximity to the nerve, at which point, under ultrasound guidance anesthetic was injected in the area of the nerve and spread of the anesthesia was seen on ultrasound in close proximity thereto.  There were no abnormalities seen on ultrasound; a digital image was taken; and the patient tolerated the procedure with no complications. Images:still images obtained, printed/placed on chart    Laterality:right  Block Type:adductor canal block  Injection Technique:catheter  Needle Type:echogenic  Needle Gauge:20 G  Resistance on Injection: none  Catheter Size:16 G  Cath Depth at skin: 8 cm    Medications Used: bupivacaine PF (MARCAINE) injection 0.25%, 10 mL  Med administered at 12/7/2021 8:48 AM      Medications  Comment:dexmedetomidine in blcok    Post Assessment  Injection Assessment: negative aspiration for heme, no paresthesia on injection and incremental injection  Patient Tolerance:comfortable throughout block  Complications:no

## 2021-12-07 NOTE — THERAPY EVALUATION
"Patient Name: Jennifer Arndt  : 1960    MRN: [id number]                              Today's Date: 2021       Admit Date: 2021    Visit Dx:     ICD-10-CM ICD-9-CM   1. Primary osteoarthritis of right knee  M17.11 715.16     Patient Active Problem List   Diagnosis   • Acute pyelonephritis   • Left ureteral stone   • Abnormal serum total protein level   • Primary osteoarthritis of right knee   • Pain     Past Medical History:   Diagnosis Date   • Anesthesia     PT STATES SENSITIVE TO AND B/P DROPS   • Arthritis     GENERALIZED   • Bruises easily    • CKD (chronic kidney disease)     Stage 3   • Depression    • Emphysema lung (HCC)     WAS TOLD SHE HAD THIS PER CT SCAN MAY 2018.... NO INHALERS   • Fibroids    • GERD (gastroesophageal reflux disease)    • H/O menorrhagia    • Hiatal hernia    • Hyperlipidemia    • Kidney calculi    • Kidney stone    • Recurrent UTI    • Right knee pain     \"BONE ON BONE\"   • Sjogren's disease (HCC)    • Spinal headache     WITH      Past Surgical History:   Procedure Laterality Date   • BREAST FIBROADENOMA SURGERY Left     4 cysts removed-all benign   • BREAST SURGERY     •  SECTION      x3   • CHOLECYSTECTOMY  2007    LAP   • COLONOSCOPY  2018   • CYSTOSCOPY N/A 2016    Procedure: CYSTOSCOPY;  Surgeon: Mikey Aguirre MD;  Location: [facility];  Service:    • HYSTERECTOMY      Dr. Foster-menorrhagia   • KIDNEY STONE SURGERY     • URETEROSCOPY LASER LITHOTRIPSY WITH STENT INSERTION Left 2016    Procedure: lt URETEROSCOPY LASER LITHOTRIPSY WITH stone basket extraction w/STENT INSERTION;  Surgeon: Hasmukh Krueger MD;  Location: [facility];  Service:    • URETEROSCOPY LASER LITHOTRIPSY WITH STENT INSERTION Left 3/1/2019    Procedure: LEFT URETEROSCOPY LASER LITHOTRIPSY STONE BASKET EXTRACTION STENT;  Surgeon: Hasmukh Krueger MD;  Location: [facility] OR;  Service: Urology   • URETEROSCOPY LASER LITHOTRIPSY WITH STENT " INSERTION Left 3/15/2019    Procedure: LEFT URETEROSCOPY LASER LITHOTRIPSY WITH STENT INSERTION STONE BASKET EXTRACTION;  Surgeon: Hasmukh Krueger MD;  Location: [facility] OR;  Service: Urology   • WISDOM TOOTH EXTRACTION        General Information     Row Name 12/07/21 1407          OT Time and Intention    Document Type evaluation  -SD     Mode of Treatment occupational therapy  -SD     Row Name 12/07/21 1407          General Information    Patient Profile Reviewed yes  Pt with Right TKA. Pt reports pain with mobility/daily activities prior to surgery. Pt lives with her spouse. 14 stairs to enter living area of home.  -SD     Prior Level of Function independent:; ADL's; all household mobility  pt reported pain with activity  -SD     Existing Precautions/Restrictions brace worn when out of bed; fall  -SD     Barriers to Rehab none identified  -SD     Row Name 12/07/21 1407          Occupational Profile    Reason for Services/Referral (Occupational Profile) decreased adl functional after TKA  -SD     Successful Occupations (Occupational Profile) pt was independent with adl's/mobility with pain  -SD     Occupational History/Life Experiences (Occupational Profile) pt retired, bue she states she returned to work part time  -SD     Environmental Supports and Barriers (Occupational Profile) spouse  -SD     Row Name 12/07/21 1407          Living Environment    Lives With spouse  -SD     Row Name 12/07/21 1407          Home Main Entrance    Number of Stairs, Main Entrance other (see comments)  14  -SD     Row Name 12/07/21 1407          Cognition    Orientation Status (Cognition) oriented x 3  -SD           User Key  (r) = Recorded By, (t) = Taken By, (c) = Cosigned By    Initials Name Provider Type    SD Sidney Villanueva OTR Occupational Therapist                 Mobility/ADL's     Row Name 12/07/21 1418          Bed Mobility    Bed Mobility supine-sit; sit-supine  -SD     Supine-Sit [place] (Bed Mobility)  contact guard  -SD     Sit-Supine [place] (Bed Mobility) contact guard  -SD     Assistive Device (Bed Mobility) bed rails; head of bed elevated  -SD     Row Name 12/07/21 1418 12/07/21 1410       Transfers    Transfers sit-stand transfer  -SD --  -SD    Sit-Stand [place] (Transfers) contact guard; verbal cues  -SD --    Row Name 12/07/21 1418          Sit-Stand Transfer    Assistive Device (Sit-Stand Transfers) walker, front-wheeled  -SD     Row Name 12/07/21 1418 12/07/21 1410       Functional Mobility    Functional Mobility- Comment pt was incontinent of urine when standing at EOB x2. Pt c/o nausea after standing. Pt was assisted back into bed. Nurse in room and aware of pt status.  -SD --  -SD    Row Name 12/07/21 1418 12/07/21 1410       Activities of Daily Living    BADL Assessment/Intervention bathing; lower body dressing  -SD --  -SD    Row Name 12/07/21 1418 12/07/21 1410       Bathing Assessment/Intervention    [place] Level (Bathing) bathing skills; lower body; moderate assist (50% patient effort)  -SD --  -SD    Comment (Bathing) Pt currently needs mod assist for lower body adl's due to pain and limited rom/strength of knee following surgery.  -SD --  -SD    Row Name 12/07/21 1418 12/07/21 1410       Lower Body Dressing Assessment/Training    [place] Level (Lower Body Dressing) lower body dressing skills; don; socks; doff; dependent (less than 25% patient effort)  -SD --  -SD    Comment (Lower Body Dressing) Pt currently needs mod assist for lower body adl's due to pain and limited rom/strength of knee following surgery.  -SD --  -SD          User Key  (r) = Recorded By, (t) = Taken By, (c) = Cosigned By    Initials Name Provider Type    SD Sidney Villanueva OTR Occupational Therapist               Obj/Interventions     Row Name 12/07/21 1421 12/07/21 1414       Range of Motion Comprehensive    General Range of Motion bilateral upper extremity ROM WNL  -SD bilateral upper extremity  ROM WNL  -SD    Row Name 12/07/21 1414          Strength Comprehensive (MMT)    Comment, General Manual Muscle Testing (MMT) Assessment BUE strength wfl  Simultaneous filing. User may be unaware of other data.  -SD     Row Name 12/07/21 1421 12/07/21 1414       Balance    Comment, Balance CGA for static sitting balance and static standing balance. Pt c/o being light headed when intially sitting up to EOB and when initially standing  -SD CGA for static standing balance  Simultaneous filing. User may be unaware of other data.  -SD          User Key  (r) = Recorded By, (t) = Taken By, (c) = Cosigned By    Initials Name Provider Type    Sidney Ayers OTR Occupational Therapist               Goals/Plan     Row Name 12/07/21 1429          Transfer Goal 1 (OT)    Activity/Assistive Device (Transfer Goal 1, OT) commode, 3-in-1  -SD     [place] Level/Cues Needed (Transfer Goal 1, OT) supervision required  -SD     Time Frame (Transfer Goal 1, OT) by discharge  -SD     Progress/Outcome (Transfer Goal 1, OT) other (see comments)  new goal  -SD     Row Name 12/07/21 1429          Dressing Goal 1 (OT)    Activity/Device (Dressing Goal 1, OT) dressing skills, all; lower body dressing  -SD     [place]/Cues Needed (Dressing Goal 1, OT) modified independence  -SD     Time Frame (Dressing Goal 1, OT) by discharge  -SD     Strategies/Barriers (Dressing Goal 1, OT) Education with compensatory strategies/use of AE as needed for lower body adl management.  -SD     Progress/Outcome (Dressing Goal 1, OT) other (see comments)  new goal  -SD           User Key  (r) = Recorded By, (t) = Taken By, (c) = Cosigned By    Initials Name Provider Type    Sidney Ayers OTR Occupational Therapist               Clinical Impression     Row Name 12/07/21 1415          Pain Assessment    Additional Documentation Pain Scale: Numbers Pre/Post-Treatment (Group)  -SD     Row Name 12/07/21 1415          Pain Scale: Numbers  Pre/Post-Treatment    Pretreatment Pain Rating 5/10  -SD     Posttreatment Pain Rating 5/10  -SD     Pain Location - Side Right  -SD     Pain Location knee  -SD     Pain Intervention(s) Repositioned; Cold pack  -SD     Row Name 12/07/21 1415          Plan of Care Review    Plan of Care Reviewed With patient  -SD     Outcome Summary OT evaluation completed. Pt managed bed mobility with SBA and sit to stand transfers with CGA. Pt currently needs mod assist for lower body adl's due to pain and limited rom/strength of right knee following surgery. Will follow up in OT for education with compensatory strategies for management of basic adl tasks. Pt states she would prefer outpt services upon discharge. Pt will need a rolling walker.  -SD     Row Name 12/07/21 1415          Therapy Assessment/Plan (OT)    Rehab Potential (OT) good, to achieve stated therapy goals  -SD     Criteria for Skilled Therapeutic Interventions Met (OT) yes; skilled treatment is necessary  -SD     Therapy Frequency (OT) other (see comments)  1-2 visits for education with compensatory strategies for adl's following  TKA.  -SD     Row Name 12/07/21 1415          Therapy Plan Review/Discharge Plan (OT)    Equipment Needs Upon Discharge (OT) walker, rolling  -SD     Row Name 12/07/21 1412          Positioning and Restraints    Pre-Treatment Position in bed  -SD     Post Treatment Position bed  -SD     In Bed supine; call light within reach; encouraged to call for assist; notified nsg  cold pack on RLE  -SD           User Key  (r) = Recorded By, (t) = Taken By, (c) = Cosigned By    Initials Name Provider Type    SD Sidney Villanueva, OTR Occupational Therapist               Outcome Measures     Row Name 12/07/21 2247          How much help from another is currently needed...    Putting on and taking off regular lower body clothing? 3  -SD     Bathing (including washing, rinsing, and drying) 3  -SD     Toileting (which includes using toilet bed pan or  urinal) 3  -SD     Putting on and taking off regular upper body clothing 4  -SD     Taking care of personal grooming (such as brushing teeth) 4  -SD     Eating meals 4  -SD     AM-PAC 6 Clicks Score (OT) 21  -SD     Row Name 12/07/21 1425          How much help from another person do you currently need...    Turning from your back to your side while in flat bed without using bedrails? 3  -BP     Moving from lying on back to sitting on the side of a flat bed without bedrails? 3  -BP     Moving to and from a bed to a chair (including a wheelchair)? 3  -BP     Standing up from a chair using your arms (e.g., wheelchair, bedside chair)? 3  -BP     Climbing 3-5 steps with a railing? 2  -BP     To walk in hospital room? 2  -BP     AM-PAC 6 Clicks Score (PT) 16  -BP     Row Name 12/07/21 1426 12/07/21 1425       Functional Assessment    Outcome Measure Options AM-PAC 6 Clicks Daily Activity (OT)  -SD AM-PAC 6 Clicks Basic Mobility (PT)  -BP          User Key  (r) = Recorded By, (t) = Taken By, (c) = Cosigned By    Initials Name Provider Type    SD Sidney Villanueva OTR Occupational Therapist    BP Ariadna Hahn, PT Physical Therapist                Occupational Therapy Education                 Title: PT OT SLP Therapies (In Progress)     Topic: Occupational Therapy (Done)     Point: ADL training (Done)     Description:   Instruct learner(s) on proper safety adaptation and remediation techniques during self care or transfers.   Instruct in proper use of assistive devices.              Learning Progress Summary           Patient Acceptance, E, VU by SD at 12/7/2021 1426    Comment: Education with OT services and compensatory strategies for adl management following TKA. will continue with education.                               User Key     Initials Effective Dates Name Provider Type Discipline    SD 06/16/21 -  Sidney Villanueva OTR Occupational Therapist OT              OT Recommendation and Plan  Therapy Frequency  (OT): other (see comments) (1-2 visits for education with compensatory strategies for adl's following  TKA.)  Plan of Care Review  Plan of Care Reviewed With: patient  Outcome Summary: OT evaluation completed. Pt managed bed mobility with SBA and sit to stand transfers with CGA. Pt currently needs mod assist for lower body adl's due to pain and limited rom/strength of right knee following surgery. Will follow up in OT for education with compensatory strategies for management of basic adl tasks. Pt states she would prefer outpt services upon discharge. Pt will need a rolling walker.     Time Calculation:    Time Calculation- OT     Row Name 12/07/21 1430             Time Calculation- OT    OT Start Time 1335  -SD      OT Stop Time 1401  -SD      OT Time Calculation (min) 26 min  -SD              Untimed Charges    OT Eval/Re-eval Minutes 26  -SD              Total Minutes    Untimed Charges Total Minutes 26  -SD       Total Minutes 26  -SD            User Key  (r) = Recorded By, (t) = Taken By, (c) = Cosigned By    Initials Name Provider Type    SD Sidney Villanueva OTR Occupational Therapist              Therapy Charges for Today     Code Description Service Date Service Provider Modifiers Qty    [id number]  OT EVAL LOW COMPLEXITY 2 12/7/2021 Sidney Villanueva OTR GO 1               ASAF Riggs  12/7/2021

## 2021-12-07 NOTE — ANESTHESIA PROCEDURE NOTES
Spinal Block      Patient reassessed immediately prior to procedure    Patient location during procedure: pre-op  Start Time: 12/7/2021 9:12 AM  Stop Time: 12/7/2021 9:13 AM  Indication:at surgeon's request and post-op pain management  Performed By  CRNA: Mikey Plunkett CRNA  Preanesthetic Checklist  Completed: patient identified, IV checked, site marked, risks and benefits discussed, surgical consent, monitors and equipment checked, pre-op evaluation and timeout performed  Spinal Block Prep:  Patient Position:sitting  Sterile Tech:cap, gloves, mask and sterile barriers  Prep:Chloraprep  Patient Monitoring:blood pressure monitoring, continuous pulse oximetry and EKG  Spinal Block Procedure  Approach:midline  Guidance:landmark technique and palpation technique  Location:L3-L4  Needle Type:Sprotte  Needle Gauge:25 G  Placement of Spinal needle event:cerebrospinal fluid aspirated  Paresthesia: no  Fluid Appearance:clear  Medications: bupivacaine PF (MARCAINE) injection 0.75%, 1.4 mL  Med Administered at 12/7/2021 9:13 AM   Post Assessment  Patient Tolerance:patient tolerated the procedure well with no apparent complications  Complications no

## 2021-12-08 VITALS
DIASTOLIC BLOOD PRESSURE: 65 MMHG | TEMPERATURE: 97.7 F | HEIGHT: 68 IN | RESPIRATION RATE: 16 BRPM | OXYGEN SATURATION: 95 % | BODY MASS INDEX: 29.22 KG/M2 | WEIGHT: 192.8 LBS | SYSTOLIC BLOOD PRESSURE: 91 MMHG | HEART RATE: 67 BPM

## 2021-12-08 DIAGNOSIS — Z96.651 S/P TKR (TOTAL KNEE REPLACEMENT), RIGHT: Primary | ICD-10-CM

## 2021-12-08 LAB
ANION GAP SERPL CALCULATED.3IONS-SCNC: 10.1 MMOL/L (ref 5–15)
BASOPHILS # BLD AUTO: 0 10*3/MM3 (ref 0–0.2)
BASOPHILS NFR BLD AUTO: 0 % (ref 0–1.5)
BUN SERPL-MCNC: 17 MG/DL (ref 8–23)
BUN/CREAT SERPL: 14.5 (ref 7–25)
CALCIUM SPEC-SCNC: 8.6 MG/DL (ref 8.6–10.5)
CHLORIDE SERPL-SCNC: 110 MMOL/L (ref 98–107)
CO2 SERPL-SCNC: 18.9 MMOL/L (ref 22–29)
CREAT SERPL-MCNC: 1.17 MG/DL (ref 0.57–1)
DEPRECATED RDW RBC AUTO: 50.4 FL (ref 37–54)
EOSINOPHIL # BLD AUTO: 0 10*3/MM3 (ref 0–0.4)
EOSINOPHIL NFR BLD AUTO: 0 % (ref 0.3–6.2)
ERYTHROCYTE [DISTWIDTH] IN BLOOD BY AUTOMATED COUNT: 13.7 % (ref 12.3–15.4)
GFR SERPL CREATININE-BSD FRML MDRD: 47 ML/MIN/1.73
GLUCOSE SERPL-MCNC: 207 MG/DL (ref 65–99)
HCT VFR BLD AUTO: 35.8 % (ref 34–46.6)
HGB BLD-MCNC: 11.2 G/DL (ref 12–15.9)
IMM GRANULOCYTES # BLD AUTO: 0.03 10*3/MM3 (ref 0–0.05)
IMM GRANULOCYTES NFR BLD AUTO: 0.3 % (ref 0–0.5)
LYMPHOCYTES # BLD AUTO: 0.8 10*3/MM3 (ref 0.7–3.1)
LYMPHOCYTES NFR BLD AUTO: 7.5 % (ref 19.6–45.3)
MCH RBC QN AUTO: 31.4 PG (ref 26.6–33)
MCHC RBC AUTO-ENTMCNC: 31.3 G/DL (ref 31.5–35.7)
MCV RBC AUTO: 100.3 FL (ref 79–97)
MONOCYTES # BLD AUTO: 0.98 10*3/MM3 (ref 0.1–0.9)
MONOCYTES NFR BLD AUTO: 9.1 % (ref 5–12)
NEUTROPHILS NFR BLD AUTO: 8.92 10*3/MM3 (ref 1.7–7)
NEUTROPHILS NFR BLD AUTO: 83.1 % (ref 42.7–76)
NRBC BLD AUTO-RTO: 0 /100 WBC (ref 0–0.2)
PLATELET # BLD AUTO: 180 10*3/MM3 (ref 140–450)
PMV BLD AUTO: 10.6 FL (ref 6–12)
POTASSIUM SERPL-SCNC: 4.6 MMOL/L (ref 3.5–5.2)
RBC # BLD AUTO: 3.57 10*6/MM3 (ref 3.77–5.28)
SODIUM SERPL-SCNC: 139 MMOL/L (ref 136–145)
WBC NRBC COR # BLD: 10.73 10*3/MM3 (ref 3.4–10.8)

## 2021-12-08 PROCEDURE — G0378 HOSPITAL OBSERVATION PER HR: HCPCS

## 2021-12-08 PROCEDURE — 97116 GAIT TRAINING THERAPY: CPT

## 2021-12-08 PROCEDURE — 80048 BASIC METABOLIC PNL TOTAL CA: CPT | Performed by: ORTHOPAEDIC SURGERY

## 2021-12-08 PROCEDURE — 85025 COMPLETE CBC W/AUTO DIFF WBC: CPT | Performed by: ORTHOPAEDIC SURGERY

## 2021-12-08 PROCEDURE — 97110 THERAPEUTIC EXERCISES: CPT

## 2021-12-08 PROCEDURE — 97535 SELF CARE MNGMENT TRAINING: CPT

## 2021-12-08 RX ORDER — PREGABALIN 75 MG/1
75 CAPSULE ORAL EVERY 12 HOURS SCHEDULED
Qty: 60 CAPSULE | Refills: 0 | Status: SHIPPED | OUTPATIENT
Start: 2021-12-08 | End: 2022-01-19

## 2021-12-08 RX ORDER — OXYCODONE HYDROCHLORIDE AND ACETAMINOPHEN 5; 325 MG/1; MG/1
1-2 TABLET ORAL EVERY 4 HOURS PRN
Qty: 42 TABLET | Refills: 0 | Status: SHIPPED | OUTPATIENT
Start: 2021-12-08 | End: 2021-12-17 | Stop reason: SDUPTHER

## 2021-12-08 RX ORDER — SENNA PLUS 8.6 MG/1
1 TABLET ORAL NIGHTLY
Qty: 30 TABLET | Refills: 0 | Status: SHIPPED | OUTPATIENT
Start: 2021-12-08 | End: 2022-01-19

## 2021-12-08 RX ORDER — DOCUSATE SODIUM 100 MG/1
100 CAPSULE, LIQUID FILLED ORAL 2 TIMES DAILY PRN
Qty: 30 CAPSULE | Refills: 0 | Status: SHIPPED | OUTPATIENT
Start: 2021-12-08 | End: 2022-01-19

## 2021-12-08 RX ADMIN — PREGABALIN 75 MG: 75 CAPSULE ORAL at 08:29

## 2021-12-08 RX ADMIN — OXYCODONE HYDROCHLORIDE 10 MG: 5 TABLET ORAL at 09:45

## 2021-12-08 RX ADMIN — OXYCODONE HYDROCHLORIDE 10 MG: 5 TABLET ORAL at 05:07

## 2021-12-08 RX ADMIN — ACETAMINOPHEN 975 MG: 325 TABLET, FILM COATED ORAL at 08:52

## 2021-12-08 RX ADMIN — APIXABAN 2.5 MG: 2.5 TABLET, FILM COATED ORAL at 08:29

## 2021-12-08 RX ADMIN — POTASSIUM CHLORIDE 10 MEQ: 1500 TABLET, EXTENDED RELEASE ORAL at 08:29

## 2021-12-08 RX ADMIN — CETIRIZINE HYDROCHLORIDE 10 MG: 10 TABLET, FILM COATED ORAL at 08:29

## 2021-12-08 RX ADMIN — PANTOPRAZOLE SODIUM 40 MG: 40 TABLET, DELAYED RELEASE ORAL at 08:29

## 2021-12-08 RX ADMIN — OXYCODONE HYDROCHLORIDE 10 MG: 5 TABLET ORAL at 13:51

## 2021-12-08 NOTE — PROGRESS NOTES
POD# 1 s/p right TKA    Subjective:Patient states doing fairly well at this point time, some moderate pain noted but moderately controlled with medication, denies fevers chills or sweats overnight, denies any residual numbness or tingling to operative extremity.  No calf pain or swelling.    Objective:  Vitals:    12/07/21 2028 12/07/21 2334 12/08/21 0424 12/08/21 0821   BP: 103/72 96/57 90/63 91/65   BP Location: Left arm Left arm Left arm Left arm   Patient Position: Lying Lying Lying Lying   Pulse: 66 63 55 67   Resp: 16 16 16 16   Temp: 98.2 °F (36.8 °C) 97.6 °F (36.4 °C) 97.5 °F (36.4 °C) 97.7 °F (36.5 °C)   TempSrc: Oral Oral Oral Oral   SpO2: 93% 90% 94% 95%   Weight:       Height:           Results from last 7 days   Lab Units 12/08/21  0456 12/07/21  0710   WBC 10*3/mm3 10.73 4.42   HEMOGLOBIN g/dL 11.2* 13.0   HEMATOCRIT % 35.8 39.8   PLATELETS 10*3/mm3 180 207       Results from last 7 days   Lab Units 12/08/21  0456 12/07/21  0710 12/07/21  0710   SODIUM mmol/L 139  --  140   POTASSIUM mmol/L 4.6  --  4.1   CHLORIDE mmol/L 110*  --  109*   CO2 mmol/L 18.9*  --  18.6*   BUN mg/dL 17  --  18   CREATININE mg/dL 1.17*  --  1.31*   GLUCOSE mg/dL 207*   < > 109*   CALCIUM mg/dL 8.6  --  9.1    < > = values in this interval not displayed.       Exam:    Right knee- incision clean, dry, intact   Flex and extend toes and ankle   No calf pain, negative Homans sign   Positive sensation light touch right foot   Brisk cap refill all digits, palpable dorsalis pedis pulse    Impression: s/p right TKA    Plan:  1. PT/OT- weight-bear as tolerated, ambulation, range of motion  2. Pain control- oxycodone, Tylenol, Lyrica  3. DVT prophylaxis-Eliquis twice a day  4. Wound care-leave mesh and glue in place until follow-up visit  5. Disposition- home with outpatient PT once medically stable and cleared by PT

## 2021-12-08 NOTE — PROGRESS NOTES
Patient: Jennifer Arndt  Procedure(s):  TOTAL KNEE ARTHROPLASTY AND ALL ASSOCIATED PROCEDURES  Anesthesia type: regional, spinal, MAC    Patient location: [facility] Surgical Floor  Last vitals:   Vitals:    12/08/21 0821   BP: 91/65   Pulse: 67   Resp: 16   Temp: 97.7 °F (36.5 °C)   SpO2: 95%     Level of consciousness: awake, alert and oriented    Post-anesthesia pain: adequate analgesia  Airway patency: patent  Respiratory: unassisted  Cardiovascular: stable and blood pressure at baseline  Hydration: euvolemic    Anesthetic complications: no, some complaint of pain.

## 2021-12-08 NOTE — CASE MANAGEMENT/SOCIAL WORK
Case Management Discharge Note      Final Note: DC home    Provided Post Acute Provider List?: Yes  Post Acute Provider List: Outpatient Therapy  Provided Post Acute Provider Quality & Resource List?: N/A    Selected Continued Care - Discharged on 12/8/2021 Admission date: 12/7/2021 - Discharge disposition: Home or Self Care    Destination    No services have been selected for the patient.              Durable Medical Equipment    No services have been selected for the patient.              Dialysis/Infusion    No services have been selected for the patient.              Home Medical Care    No services have been selected for the patient.              Therapy    No services have been selected for the patient.              Community Resources    No services have been selected for the patient.              Community & DME    No services have been selected for the patient.                       Final Discharge Disposition Code: 01 - home or self-care

## 2021-12-08 NOTE — DISCHARGE SUMMARY
Orthopedic Discharge Summary      Patient: Jennifer Arndt      YOB: 1960    Medical Record Number: [id number]    Attending Physician: John Chan MD  Consulting Physician(s):   Date of Admission: 12/7/2021  7:03 AM  Date of Discharge: 12/8/2021      Patient Active Problem List   Diagnosis   • Acute pyelonephritis   • Left ureteral stone   • Abnormal serum total protein level   • Primary osteoarthritis of right knee   • Pain     Status Post: CO TOTAL KNEE ARTHROPLASTY [30952] (TOTAL KNEE ARTHROPLASTY AND ALL ASSOCIATED PROCEDURES)      Allergies   Allergen Reactions   • Morphine And Related Anaphylaxis     FELT LIKE THROAT SWELLED, SOME SWALLOWING TROUBLE       • Nickel Swelling   • Nsaids Swelling     STAGE III KIDNEY FAILURE         Current Medications:     Discharge Medications      New Medications      Instructions Start Date   apixaban 2.5 MG tablet tablet  Commonly known as: ELIQUIS   2.5 mg, Oral, Every 12 Hours Scheduled      docusate sodium 100 MG capsule  Commonly known as: COLACE   100 mg, Oral, 2 Times Daily PRN      elastomeric 8 mL/hr reservoir 1 each device 1 Device, sodium chloride 0.9 % solution 300 mL with bupivacaine (PF) 0.5 % solution 100 mL   8 mL/hr (8 mL/hr), Intradermal, Continuous      oxyCODONE-acetaminophen 5-325 MG per tablet  Commonly known as: PERCOCET   1-2 tablets, Oral, Every 4 Hours PRN      pregabalin 75 MG capsule  Commonly known as: LYRICA   75 mg, Oral, Every 12 Hours Scheduled      senna 8.6 MG tablet  Commonly known as: SENOKOT   1 tablet, Oral, Nightly         Continue These Medications      Instructions Start Date   ascorbic acid 1000 MG tablet  Commonly known as: VITAMIN C   1,000 mg, Oral, Every Morning      atorvastatin 10 MG tablet  Commonly known as: LIPITOR   10 mg, Oral, Nightly      CALCARB 600 PO   600 mg, Oral, 2 Times Daily      loratadine 10 MG tablet  Commonly known as: CLARITIN   10 mg, Oral, Daily PRN      MULTI-YAHIR PO   1 tablet,  "Oral, Every Evening      omeprazole 20 MG capsule  Commonly known as: priLOSEC   20 mg, Oral, Daily PRN      potassium citrate 10 MEQ (1080 MG) CR tablet  Commonly known as: UROCIT-K   20 mEq, Oral, Every Evening      VITAMIN D (CHOLECALCIFEROL) PO   1,000 Units, Oral, Every Morning         Stop These Medications    acetaminophen 500 MG tablet  Commonly known as: TYLENOL                Past Medical History:   Diagnosis Date   • Anesthesia     PT STATES SENSITIVE TO AND B/P DROPS   • Arthritis     GENERALIZED   • Bruises easily    • CKD (chronic kidney disease)     Stage 3   • Depression    • Emphysema lung (HCC)     WAS TOLD SHE HAD THIS PER CT SCAN MAY 2018.... NO INHALERS   • Fibroids    • GERD (gastroesophageal reflux disease)    • H/O menorrhagia    • Hiatal hernia    • Hyperlipidemia    • Kidney calculi    • Kidney stone    • Recurrent UTI    • Right knee pain     \"BONE ON BONE\"   • Sjogren's disease (HCC)    • Spinal headache     WITH      Past Surgical History:   Procedure Laterality Date   • BREAST FIBROADENOMA SURGERY Left     4 cysts removed-all benign   • BREAST SURGERY     •  SECTION      x3   • CHOLECYSTECTOMY      LAP   • COLONOSCOPY  2018   • CYSTOSCOPY N/A 2016    Procedure: CYSTOSCOPY;  Surgeon: Mikey Aguirre MD;  Location: [facility];  Service:    • HYSTERECTOMY      Dr. Foster-menorrhagia   • KIDNEY STONE SURGERY     • TOTAL KNEE ARTHROPLASTY Right 2021    Procedure: TOTAL KNEE ARTHROPLASTY AND ALL ASSOCIATED PROCEDURES;  Surgeon: John Chan MD;  Location: [facility];  Service: Orthopedics;  Laterality: Right;   • URETEROSCOPY LASER LITHOTRIPSY WITH STENT INSERTION Left 2016    Procedure: lt URETEROSCOPY LASER LITHOTRIPSY WITH stone basket extraction w/STENT INSERTION;  Surgeon: Hasmukh Krueger MD;  Location: [facility] OR;  Service:    • URETEROSCOPY LASER LITHOTRIPSY WITH STENT INSERTION Left 3/1/2019    Procedure: LEFT URETEROSCOPY " "LASER LITHOTRIPSY STONE BASKET EXTRACTION STENT;  Surgeon: Hasmukh Krueger MD;  Location: [facility] OR;  Service: Urology   • URETEROSCOPY LASER LITHOTRIPSY WITH STENT INSERTION Left 3/15/2019    Procedure: LEFT URETEROSCOPY LASER LITHOTRIPSY WITH STENT INSERTION STONE BASKET EXTRACTION;  Surgeon: Hasmukh Krueger MD;  Location: [facility] OR;  Service: Urology   • WISDOM TOOTH EXTRACTION       Social History     Occupational History     Employer: RETIRED     Comment: [employer]   Tobacco Use   • Smoking status: Former Smoker     Packs/day: 0.50     Years: 43.00     Pack years: 21.50     Types: Cigarettes     Quit date: 2017     Years since quittin.3   • Smokeless tobacco: Never Used   Vaping Use   • Vaping Use: Never used   Substance and Sexual Activity   • Alcohol use: No   • Drug use: No   • Sexual activity: Defer     Partners: Female      Social History     Social History Narrative   • Not on file     Family History   Problem Relation Age of Onset   • Heart disease Mother    • Arthritis Mother    • Osteoporosis Mother    • Hypertension Mother    • Hyperlipidemia Mother    • Cancer Father    • Hodgkin's lymphoma Father    • Heart disease Maternal Grandmother    • Heart disease Maternal Grandfather    • Heart disease Paternal Grandmother    • Kidney disease Paternal Grandmother    • Heart disease Paternal Grandfather    • Kidney disease Paternal Grandfather    • Malig Hyperthermia Neg Hx          Physical Exam: 61 y.o. female  General Appearance:    Alert, cooperative, in no acute distress                      Vitals:    21 2334 21 0424 21 0821 21 1159   BP: 96/57 90/63 91/65    BP Location: Left arm Left arm Left arm    Patient Position: Lying Lying Lying    Pulse: 63 55 67    Resp: 16 16 16    Temp: 97.6 °F (36.4 °C) 97.5 °F (36.4 °C) 97.7 °F (36.5 °C)    TempSrc: Oral Oral Oral    SpO2: 90% 94% 95%    Weight:    Comment: 192.8 lb   Height:    Comment: 172.7 cm (68\") "        Head:    Normocephalic, without obvious abnormality, atraumatic   Eyes:            Lids and lashes normal, conjunctivae and sclerae normal, no   icterus, no pallor, corneas clear, PERRLA   Ears:    Ears appear intact with no abnormalities noted   Throat:   No oral lesions, no thrush, oral mucosa moist   Neck:   No adenopathy, supple, trachea midline, no thyromegaly, no    carotid bruit, no JVD   Back:     No kyphosis present, no scoliosis present, no skin lesions,       erythema or scars, no tenderness to percussion or                   palpation,   range of motion normal   Lungs:     Clear to auscultation,respirations regular, even and                   unlabored    Heart:    Regular rhythm and normal rate, normal S1 and S2, no            murmur, no gallop, no rub, no click   Chest Wall:    No abnormalities observed   Abdomen:     Normal bowel sounds, no masses, no organomegaly, soft        non-tender, non-distended, no guarding, no rebound                 tenderness   Rectal:     Deferred   Extremities:   Incision intact without signs or symptoms of infection.               Neurovascular status remains intact to operative extremity.      Moves all extremities well, no edema, no cyanosis, no              redness   Pulses:   Pulses palpable and equal bilaterally   Skin:   No bleeding, bruising or rash   Lymph nodes:   No palpable adenopathy   Neurologic:   Cranial nerves 2 - 12 grossly intact, sensation intact, DTR        present and equal bilaterally           Hospital Course:  61 y.o. female admitted to [facility] to services of John Chan MD with Primary osteoarthritis of right knee [M17.11]  Pain [R52] on 12/7/2021 and underwent NE TOTAL KNEE ARTHROPLASTY [17881] (TOTAL KNEE ARTHROPLASTY AND ALL ASSOCIATED PROCEDURES)  Per John Chan MD. Antibiotic and VTE prophylaxis were per SCIP protocols. Post-operatively the patient transferred to the post-operative floor where the patient  underwent mobilization therapy that included active as well as passive ROM exercises. Opioids were titrated to achieve appropriate pain management to allow for participation in mobilization exercises. Vital signs are now stable. The incision is intact without signs or symptoms of infection. Operative extremity neurovascular status remains intact.  No calf swelling, negative Homans sign, no signs or symptoms consistent with DVT.   Appropriate education re: incision care, activity levels, medications, and follow up visits was completed and all questions were answered. The patient is now deemed stable for discharge to Home.      DIAGNOSTIC TESTS:     Admission on 12/07/2021   Component Date Value Ref Range Status   • PTT 12/07/2021 25.7  24.3 - 38.1 seconds Final   • Glucose 12/07/2021 109* 65 - 99 mg/dL Final   • BUN 12/07/2021 18  8 - 23 mg/dL Final   • Creatinine 12/07/2021 1.31* 0.57 - 1.00 mg/dL Final   • Sodium 12/07/2021 140  136 - 145 mmol/L Final   • Potassium 12/07/2021 4.1  3.5 - 5.2 mmol/L Final   • Chloride 12/07/2021 109* 98 - 107 mmol/L Final   • CO2 12/07/2021 18.6* 22.0 - 29.0 mmol/L Final   • Calcium 12/07/2021 9.1  8.6 - 10.5 mg/dL Final   • eGFR Non  Amer 12/07/2021 41* >60 mL/min/1.73 Final   • BUN/Creatinine Ratio 12/07/2021 13.7  7.0 - 25.0 Final   • Anion Gap 12/07/2021 12.4  5.0 - 15.0 mmol/L Final   • Protime 12/07/2021 13.4  12.1 - 15.0 Seconds Final   • INR 12/07/2021 1.00  0.90 - 1.10 Final   • WBC 12/07/2021 4.42  3.40 - 10.80 10*3/mm3 Final   • RBC 12/07/2021 4.17  3.77 - 5.28 10*6/mm3 Final   • Hemoglobin 12/07/2021 13.0  12.0 - 15.9 g/dL Final   • Hematocrit 12/07/2021 39.8  34.0 - 46.6 % Final   • MCV 12/07/2021 95.4  79.0 - 97.0 fL Final   • MCH 12/07/2021 31.2  26.6 - 33.0 pg Final   • MCHC 12/07/2021 32.7  31.5 - 35.7 g/dL Final   • RDW 12/07/2021 13.2  12.3 - 15.4 % Final   • RDW-SD 12/07/2021 46.8  37.0 - 54.0 fl Final   • MPV 12/07/2021 10.7  6.0 - 12.0 fL Final   •  Platelets 12/07/2021 207  140 - 450 10*3/mm3 Final   • Neutrophil % 12/07/2021 40.8* 42.7 - 76.0 % Final   • Lymphocyte % 12/07/2021 37.6  19.6 - 45.3 % Final   • Monocyte % 12/07/2021 16.7* 5.0 - 12.0 % Final   • Eosinophil % 12/07/2021 3.8  0.3 - 6.2 % Final   • Basophil % 12/07/2021 0.9  0.0 - 1.5 % Final   • Immature Grans % 12/07/2021 0.2  0.0 - 0.5 % Final   • Neutrophils, Absolute 12/07/2021 1.80  1.70 - 7.00 10*3/mm3 Final   • Lymphocytes, Absolute 12/07/2021 1.66  0.70 - 3.10 10*3/mm3 Final   • Monocytes, Absolute 12/07/2021 0.74  0.10 - 0.90 10*3/mm3 Final   • Eosinophils, Absolute 12/07/2021 0.17  0.00 - 0.40 10*3/mm3 Final   • Basophils, Absolute 12/07/2021 0.04  0.00 - 0.20 10*3/mm3 Final   • Immature Grans, Absolute 12/07/2021 0.01  0.00 - 0.05 10*3/mm3 Final   • nRBC 12/07/2021 0.0  0.0 - 0.2 /100 WBC Final   • TSH 12/07/2021 1.550  0.270 - 4.200 uIU/mL Final   • Hemoglobin A1C 12/07/2021 5.90* 4.80 - 5.60 % Final   • Glucose 12/08/2021 207* 65 - 99 mg/dL Final   • BUN 12/08/2021 17  8 - 23 mg/dL Final   • Creatinine 12/08/2021 1.17* 0.57 - 1.00 mg/dL Final   • Sodium 12/08/2021 139  136 - 145 mmol/L Final   • Potassium 12/08/2021 4.6  3.5 - 5.2 mmol/L Final   • Chloride 12/08/2021 110* 98 - 107 mmol/L Final   • CO2 12/08/2021 18.9* 22.0 - 29.0 mmol/L Final   • Calcium 12/08/2021 8.6  8.6 - 10.5 mg/dL Final   • eGFR Non  Amer 12/08/2021 47* >60 mL/min/1.73 Final   • BUN/Creatinine Ratio 12/08/2021 14.5  7.0 - 25.0 Final   • Anion Gap 12/08/2021 10.1  5.0 - 15.0 mmol/L Final   • WBC 12/08/2021 10.73  3.40 - 10.80 10*3/mm3 Final   • RBC 12/08/2021 3.57* 3.77 - 5.28 10*6/mm3 Final   • Hemoglobin 12/08/2021 11.2* 12.0 - 15.9 g/dL Final   • Hematocrit 12/08/2021 35.8  34.0 - 46.6 % Final   • MCV 12/08/2021 100.3* 79.0 - 97.0 fL Final   • MCH 12/08/2021 31.4  26.6 - 33.0 pg Final   • MCHC 12/08/2021 31.3* 31.5 - 35.7 g/dL Final   • RDW 12/08/2021 13.7  12.3 - 15.4 % Final   • RDW-SD 12/08/2021 50.4   37.0 - 54.0 fl Final   • MPV 12/08/2021 10.6  6.0 - 12.0 fL Final   • Platelets 12/08/2021 180  140 - 450 10*3/mm3 Final   • Neutrophil % 12/08/2021 83.1* 42.7 - 76.0 % Final   • Lymphocyte % 12/08/2021 7.5* 19.6 - 45.3 % Final   • Monocyte % 12/08/2021 9.1  5.0 - 12.0 % Final   • Eosinophil % 12/08/2021 0.0* 0.3 - 6.2 % Final   • Basophil % 12/08/2021 0.0  0.0 - 1.5 % Final   • Immature Grans % 12/08/2021 0.3  0.0 - 0.5 % Final   • Neutrophils, Absolute 12/08/2021 8.92* 1.70 - 7.00 10*3/mm3 Final   • Lymphocytes, Absolute 12/08/2021 0.80  0.70 - 3.10 10*3/mm3 Final   • Monocytes, Absolute 12/08/2021 0.98* 0.10 - 0.90 10*3/mm3 Final   • Eosinophils, Absolute 12/08/2021 0.00  0.00 - 0.40 10*3/mm3 Final   • Basophils, Absolute 12/08/2021 0.00  0.00 - 0.20 10*3/mm3 Final   • Immature Grans, Absolute 12/08/2021 0.03  0.00 - 0.05 10*3/mm3 Final   • nRBC 12/08/2021 0.0  0.0 - 0.2 /100 WBC Final       No results found.    Discharge and Follow up Instructions:   See discharge instructions for details  Follow-up in office in 2 weeks with Charis Melara, nurse practitioner  Take medication daily for DVT prophylaxis- see discharge medications      Date: 12/8/2021    John Chan MD

## 2021-12-08 NOTE — THERAPY DISCHARGE NOTE
"Acute Care - Occupational Therapy Discharge   [place]    Patient Name: Jennifer Arndt  : 1960    MRN: [id number]                              Today's Date: 2021       Admit Date: 2021    Visit Dx:     ICD-10-CM ICD-9-CM   1. Primary osteoarthritis of right knee  M17.11 715.16     Patient Active Problem List   Diagnosis   • Acute pyelonephritis   • Left ureteral stone   • Abnormal serum total protein level   • Primary osteoarthritis of right knee   • Pain     Past Medical History:   Diagnosis Date   • Anesthesia     PT STATES SENSITIVE TO AND B/P DROPS   • Arthritis     GENERALIZED   • Bruises easily    • CKD (chronic kidney disease)     Stage 3   • Depression    • Emphysema lung (HCC)     WAS TOLD SHE HAD THIS PER CT SCAN MAY 2018.... NO INHALERS   • Fibroids    • GERD (gastroesophageal reflux disease)    • H/O menorrhagia    • Hiatal hernia    • Hyperlipidemia    • Kidney calculi    • Kidney stone    • Recurrent UTI    • Right knee pain     \"BONE ON BONE\"   • Sjogren's disease (HCC)    • Spinal headache     WITH      Past Surgical History:   Procedure Laterality Date   • BREAST FIBROADENOMA SURGERY Left     4 cysts removed-all benign   • BREAST SURGERY     •  SECTION      x3   • CHOLECYSTECTOMY  2007    LAP   • COLONOSCOPY  2018   • CYSTOSCOPY N/A 2016    Procedure: CYSTOSCOPY;  Surgeon: Mikey Aguirre MD;  Location: [facility] OR;  Service:    • HYSTERECTOMY      Dr. Foster-menorrhagia   • KIDNEY STONE SURGERY     • TOTAL KNEE ARTHROPLASTY Right 2021    Procedure: TOTAL KNEE ARTHROPLASTY AND ALL ASSOCIATED PROCEDURES;  Surgeon: John Chan MD;  Location: [facility] OR;  Service: Orthopedics;  Laterality: Right;   • URETEROSCOPY LASER LITHOTRIPSY WITH STENT INSERTION Left 2016    Procedure: lt URETEROSCOPY LASER LITHOTRIPSY WITH stone basket extraction w/STENT INSERTION;  Surgeon: Hasmukh Krueger MD;  Location: [facility] OR;  Service:    • " URETEROSCOPY LASER LITHOTRIPSY WITH STENT INSERTION Left 3/1/2019    Procedure: LEFT URETEROSCOPY LASER LITHOTRIPSY STONE BASKET EXTRACTION STENT;  Surgeon: Hasmukh Krueger MD;  Location: [facility] OR;  Service: Urology   • URETEROSCOPY LASER LITHOTRIPSY WITH STENT INSERTION Left 3/15/2019    Procedure: LEFT URETEROSCOPY LASER LITHOTRIPSY WITH STENT INSERTION STONE BASKET EXTRACTION;  Surgeon: Hasmukh Krueger MD;  Location: [facility] OR;  Service: Urology   • WISDOM TOOTH EXTRACTION        General Information     Row Name 12/08/21 1039          OT Time and Intention    Document Type discharge treatment  -SD     Mode of Treatment occupational therapy  -SD     Row Name 12/08/21 1039          General Information    Existing Precautions/Restrictions fall  -SD     Barriers to Rehab none identified  -SD           User Key  (r) = Recorded By, (t) = Taken By, (c) = Cosigned By    Initials Name Provider Type    SD Sidney Villanueva OTR Occupational Therapist               Mobility/ADL's     Row Name 12/08/21 1040          Bed Mobility    Bed Mobility supine-sit; sit-supine  -SD     Supine-Sit [place] (Bed Mobility) supervision  -SD     Assistive Device (Bed Mobility) head of bed elevated  -SD     Row Name 12/08/21 1040          Transfers    Sit-Stand [place] (Transfers) supervision  -SD     Row Name 12/08/21 1040          Sit-Stand Transfer    Assistive Device (Sit-Stand Transfers) walker, front-wheeled  -SD     Row Name 12/08/21 1040          Functional Mobility    Functional Mobility- Ind. Level supervision required  -SD     Functional Mobility- Device rolling walker  -SD     Functional Mobility-Distance (Feet) 50  -SD     Row Name 12/08/21 1040          Bathing Assessment/Intervention    Comment (Bathing) Education regarding compensatory strategies for adl management. Pt does not have concerns. She states her spouse will be able to assist as needed.  -SD     Row Name 12/08/21 1040          Lower Body  Dressing Assessment/Training    Comment (Lower Body Dressing) Education with compensatory strategies for management of clothing following TKA.  pt has no concerns.  -SD           User Key  (r) = Recorded By, (t) = Taken By, (c) = Cosigned By    Initials Name Provider Type    Sidney Ayers OTR Occupational Therapist               Obj/Interventions    No documentation.                Goals/Plan     Row Name 12/08/21 1038          Transfer Goal 1 (OT)    Activity/Assistive Device (Transfer Goal 1, OT) commode, 3-in-1  -SD     [place] Level/Cues Needed (Transfer Goal 1, OT) supervision required  -SD     Time Frame (Transfer Goal 1, OT) by discharge  -SD     Progress/Outcome (Transfer Goal 1, OT) goal met  pt managing transfers with supervision using a rolling walker for support.  -SD     Row Name 12/08/21 1038          Dressing Goal 1 (OT)    Activity/Device (Dressing Goal 1, OT) dressing skills, all; lower body dressing  -SD     [place]/Cues Needed (Dressing Goal 1, OT) modified independence  -SD     Time Frame (Dressing Goal 1, OT) by discharge  -SD     Strategies/Barriers (Dressing Goal 1, OT) Education with compensatory strategies/use of AE as needed for lower body adl management.  -SD     Progress/Outcome (Dressing Goal 1, OT) goal met  Education provided. Pt reports no need of AE/no concerns for discharge.  -SD           User Key  (r) = Recorded By, (t) = Taken By, (c) = Cosigned By    Initials Name Provider Type    Sidney Ayers OTR Occupational Therapist               Clinical Impression     Row Name 12/08/21 1042          Pain Scale: Numbers Pre/Post-Treatment    Pretreatment Pain Rating 5/10  -SD     Posttreatment Pain Rating 5/10  -SD     Pain Location - Side Right  -SD     Pain Location knee  -SD     Pain Intervention(s) Repositioned; Ambulation/increased activity  -SD     Row Name 12/08/21 1042          Plan of Care Review    Plan of Care Reviewed With patient  -SD     Outcome  Summary OT: Education regarding compensatory strategies for management of adl tasks following TKA. Pt reports no concerns for discharge to home with support of spouse. No further OT services recommended.  -SD     Row Name 12/08/21 1042          Therapy Plan Review/Discharge Plan (OT)    Equipment Needs Upon Discharge (OT) walker, rolling  -SD     Anticipated Discharge Disposition (OT) home with outpatient therapy services  -SD     Row Name 12/08/21 1042          Positioning and Restraints    Pre-Treatment Position in bed  -SD     Post Treatment Position bed  -SD     In Chair with PT  -SD           User Key  (r) = Recorded By, (t) = Taken By, (c) = Cosigned By    Initials Name Provider Type    SD Sidney Villanueva, OTR Occupational Therapist               Outcome Measures     Row Name 12/08/21 1046          How much help from another is currently needed...    Putting on and taking off regular lower body clothing? 3  -SD     Bathing (including washing, rinsing, and drying) 3  -SD     Toileting (which includes using toilet bed pan or urinal) 3  -SD     Putting on and taking off regular upper body clothing 4  -SD     Taking care of personal grooming (such as brushing teeth) 4  -SD     Eating meals 4  -SD     AM-PAC 6 Clicks Score (OT) 21  -SD     Row Name 12/08/21 0827          How much help from another person do you currently need...    Turning from your back to your side while in flat bed without using bedrails? 4  -BP     Moving from lying on back to sitting on the side of a flat bed without bedrails? 4  -BP     Moving to and from a bed to a chair (including a wheelchair)? 3  -BP     Standing up from a chair using your arms (e.g., wheelchair, bedside chair)? 3  -BP     Climbing 3-5 steps with a railing? 3  -BP     To walk in hospital room? 3  -BP     AM-PAC 6 Clicks Score (PT) 20  -BP     Row Name 12/08/21 1046 12/08/21 0827       Functional Assessment    Outcome Measure Options AM-PAC 6 Clicks Daily Activity (OT)   -SD AM-PAC 6 Clicks Basic Mobility (PT); PADD  -BP          User Key  (r) = Recorded By, (t) = Taken By, (c) = Cosigned By    Initials Name Provider Type    SD Sidney Villanueva OTR Occupational Therapist    BP Ariadna Hahn, PT Physical Therapist              Occupational Therapy Education                 Title: PT OT SLP Therapies (Resolved)     Topic: Occupational Therapy (Resolved)     Point: ADL training (Resolved)     Description:   Instruct learner(s) on proper safety adaptation and remediation techniques during self care or transfers.   Instruct in proper use of assistive devices.              Learning Progress Summary           Patient Acceptance, E, VU by SD at 12/8/2021 1036    Comment: Education regarding compensatory strategies for management of adl's following TKA. Pt does not have concerns for management of daily tasks. Pt states she has support at home as needed. No further OT services rec.    Acceptance, E, VU by SD at 12/7/2021 1426    Comment: Education with OT services and compensatory strategies for adl management following TKA. will continue with education.                               User Key     Initials Effective Dates Name Provider Type Discipline    SD 06/16/21 -  Sidney Villanueva OTELLA Occupational Therapist OT              OT Recommendation and Plan  Retired Outcome Summary/Treatment Plan (OT)  Anticipated Discharge Disposition (OT): home with outpatient therapy services  Therapy Frequency (OT): other (see comments) (1-2 visits for education with compensatory strategies for adl's following  TKA.)  Plan of Care Review  Plan of Care Reviewed With: patient  Outcome Summary: OT: Education regarding compensatory strategies for management of adl tasks following TKA. Pt reports no concerns for discharge to home with support of spouse. No further OT services recommended.       Time Calculation:    Time Calculation- OT     Row Name 12/08/21 1038 12/08/21 1036 12/08/21 1016       Time  Calculation- OT    OT Start Time 0908  -SD 0835  -SD --    OT Stop Time 0913  -SD 0846  -SD --    OT Time Calculation (min) 5 min  -SD 11 min  -SD --       Timed Charges    07817 - Gait Training Minutes  -- -- 18  -BP    13025 - OT Self Care/Mgmt Minutes 5  -SD 11  -SD --       Total Minutes    Timed Charges Total Minutes 5  -SD 11  -SD 18  -BP     Total Minutes 5  -SD 11  -SD 18  -BP          User Key  (r) = Recorded By, (t) = Taken By, (c) = Cosigned By    Initials Name Provider Type    SD Sidney Villanueva OTR Occupational Therapist    BP Ariadna Hahn, PT Physical Therapist              Therapy Charges for Today     Code Description Service Date Service Provider Modifiers Qty    [id number] HC OT SELF CARE/MGMT/TRAIN EA 15 MIN 12/8/2021 Sidney Villanueva OTR GO 1               ASAF Riggs  12/8/2021

## 2021-12-08 NOTE — PLAN OF CARE
Goal Outcome Evaluation:  Plan of Care Reviewed With: patient           Outcome Summary: OT: Education regarding compensatory strategies for management of adl tasks following TKA. Pt reports no concerns for discharge to home with support of spouse. No further OT services recommended.

## 2021-12-08 NOTE — PLAN OF CARE
Goal Outcome Evaluation:  Plan of Care Reviewed With: patient           Outcome Summary: Patient ambulated anders with walker. Up to bathroom. VSS. PRN pain medication given.

## 2021-12-08 NOTE — PLAN OF CARE
Goal Outcome Evaluation:  Plan of Care Reviewed With: patient           Outcome Summary: PT Evaluation Complete: Patient performs supine to/from sit transfers with CGA and sit to/from stand transfers from elevated bed surface with CGA with use of FWW. Patient incontinent of bladder in standing from due to residual effects from surgical block. Patient became nauseated and lightheaded while in standing. Unable to initiate gait training at this time. Notified RN. BP assessed following transfrs in supine, 107/64. Patient would benefit from physical therapy to address deficits in funcitonal mobility and LE strength/ROM. Recommend outpatient PT at discharge.

## 2021-12-08 NOTE — CASE MANAGEMENT/SOCIAL WORK
Discharge Planning Assessment  SMITH Brunner     Patient Name: Jennifer Arndt  MRN: [id number]  Today's Date: 12/8/2021    Admit Date: 12/7/2021     Discharge Needs Assessment     Row Name 12/08/21 1445       Living Environment    Lives With spouse    Name(s) of Who Lives With Patient Pratik,     Current Living Arrangements home/apartment/condo    Duration at Residence 28 years    Potentially Unsafe Housing Conditions --  none    Primary Care Provided by self    Provides Primary Care For no one    Family Caregiver if Needed spouse    Family Caregiver Names Pratik,     Quality of Family Relationships supportive; helpful    Able to Return to Prior Arrangements yes       Resource/Environmental Concerns    Resource/Environmental Concerns none    Transportation Concerns car, none       Transition Planning    Patient/Family Anticipates Transition to home with family    Patient/Family Anticipated Services at Transition rehabilitation services    Transportation Anticipated family or friend will provide       Discharge Needs Assessment    Readmission Within the Last 30 Days no previous admission in last 30 days    Current Outpatient/Agency/Support Group --  none    Equipment Currently Used at Home cane, straight; commode    Concerns to be Addressed discharge planning    Equipment Needed After Discharge walker, rolling    Outpatient/Agency/Support Group Needs outpatient therapy    Discharge Facility/Level of Care Needs outpatient therapy    Provided Post Acute Provider List? Yes    Post Acute Provider List Outpatient Therapy    Provided Post Acute Provider Quality & Resource List? N/A    Current Discharge Risk --  none               Discharge Plan     Row Name 12/08/21 9228       Plan    Plan Discharge home with OP therapy    Patient/Family in Agreement with Plan yes    Plan Comments I spoke with the patient at bedside. I introduced myself and explained my role as .  I verified the information on the  facesheet the patients PCP as FERMÍN Medrano.  The patient uses [facility] in [place].  She is able to afford her medications and can  them up.  The patient lives in single story home for the past 28 years. She lives with her .  There are 14 steps to enter the home and she is able to enter and maneuver around her home with no issues.  She is independent with her ADL’s, has a car and drives.   The patient’s  will pick her up at discharge.  She has a cane and bedside commode at home and a rolling walker was provided.  I offered the patient choice of DME provider and [facility] was selected.  I offered the patient information regarding home health and other community resources and she advised that she would like to come to [facility] for her outpatient therapy.  I called and obtained an appointment for 12/10/2021 at 0800. The information is updated on her discharge instructions.  The patient will discharge home with her family to assist and outpatient therapy at [facility] and she is agreeable to that plan.  She denied having and further questions or concerns at this time.  CM will continue to follow for further needs.              Continued Care and Services - Admitted Since 12/7/2021     Durable Medical Equipment     Service Provider Request Status Selected Services Address Phone Fax Patient Preferred    [facility]  Pending - Request Sent N/A [address] [place] [address] [phone] --              Expected Discharge Date and Time     Expected Discharge Date Expected Discharge Time    Dec 8, 2021          Demographic Summary     Row Name 12/08/21 1424       General Information    Admission Type observation    Arrived From home    Referral Source admission list    Reason for Consult discharge planning    Preferred Language English     Used During This Interaction no       Contact Information    Permission Granted to Share Info With                 Functional Status    No documentation.                Psychosocial    No documentation.                Abuse/Neglect    No documentation.                Legal    No documentation.                Substance Abuse    No documentation.                Patient Forms    No documentation.                   Beba Mercado RN

## 2021-12-08 NOTE — THERAPY DISCHARGE NOTE
"Acute Care - Physical Therapy Treatment Note/Discharge   Sandra Wiseman     Patient Name: Jennifer Arndt  : 1960  MRN: [id number]  Today's Date: 2021                Admit Date: 2021    Visit Dx:    ICD-10-CM ICD-9-CM   1. Primary osteoarthritis of right knee  M17.11 715.16     Patient Active Problem List   Diagnosis   • Acute pyelonephritis   • Left ureteral stone   • Abnormal serum total protein level   • Primary osteoarthritis of right knee   • Pain     Past Medical History:   Diagnosis Date   • Anesthesia     PT STATES SENSITIVE TO AND B/P DROPS   • Arthritis     GENERALIZED   • Bruises easily    • CKD (chronic kidney disease)     Stage 3   • Depression    • Emphysema lung (HCC)     WAS TOLD SHE HAD THIS PER CT SCAN MAY 2018.... NO INHALERS   • Fibroids    • GERD (gastroesophageal reflux disease)    • H/O menorrhagia    • Hiatal hernia    • Hyperlipidemia    • Kidney calculi    • Kidney stone    • Recurrent UTI    • Right knee pain     \"BONE ON BONE\"   • Sjogren's disease (HCC)    • Spinal headache     WITH      Past Surgical History:   Procedure Laterality Date   • BREAST FIBROADENOMA SURGERY Left     4 cysts removed-all benign   • BREAST SURGERY     •  SECTION      x3   • CHOLECYSTECTOMY  2007    LAP   • COLONOSCOPY  2018   • CYSTOSCOPY N/A 2016    Procedure: CYSTOSCOPY;  Surgeon: Mikey Aguirre MD;  Location: [facility];  Service:    • HYSTERECTOMY      Dr. Foster-menorrhagia   • KIDNEY STONE SURGERY     • URETEROSCOPY LASER LITHOTRIPSY WITH STENT INSERTION Left 2016    Procedure: lt URETEROSCOPY LASER LITHOTRIPSY WITH stone basket extraction w/STENT INSERTION;  Surgeon: Hasmukh Krueger MD;  Location: [facility];  Service:    • URETEROSCOPY LASER LITHOTRIPSY WITH STENT INSERTION Left 3/1/2019    Procedure: LEFT URETEROSCOPY LASER LITHOTRIPSY STONE BASKET EXTRACTION STENT;  Surgeon: Hasmukh Krueger MD;  Location: [facility];  Service: Urology "   • URETEROSCOPY LASER LITHOTRIPSY WITH STENT INSERTION Left 3/15/2019    Procedure: LEFT URETEROSCOPY LASER LITHOTRIPSY WITH STENT INSERTION STONE BASKET EXTRACTION;  Surgeon: Hasmukh Krueger MD;  Location: [facility];  Service: Urology   • WISDOM TOOTH EXTRACTION         PT Assessment (last 12 hours)     PT Evaluation and Treatment     Row Name 12/08/21 0827          Physical Therapy Time and Intention    Subjective Information complains of; pain  -BP     Document Type therapy note (daily note); discharge treatment  -BP     Mode of Treatment physical therapy  -BP     Patient Effort good  -BP     Symptoms Noted During/After Treatment increased pain  -BP     Comment RN provided meds following PT treatment.  -BP     Row Name 12/08/21 0827          General Information    Patient/Family/Caregiver Comments/Observations Patient supine in bed with HOB elevated. RN initially present giving patient meds  -BP     Existing Precautions/Restrictions fall  Brace no longer required  -BP     Risks Reviewed patient:; LOB; increased discomfort  -BP     Benefits Reviewed patient:; improve function; increase strength; increase independence  -BP     Barriers to Rehab none identified  -BP     Row Name 12/08/21 0827          Cognition    Personal Safety Interventions gait belt; nonskid shoes/slippers when out of bed  -BP     Row Name 12/08/21 0827          Pain    Additional Documentation Pain Scale: Numbers Pre/Post-Treatment (Group)  -BP     Row Name 12/08/21 0827          Pain Scale: Numbers Pre/Post-Treatment    Pretreatment Pain Rating 5/10  -BP     Posttreatment Pain Rating 5/10  -BP     Pain Location - Side Right  -BP     Pain Location - Orientation incisional  -BP     Pain Location knee  -BP     Row Name 12/08/21 0827          Pain Scale: Word Pre/Post-Treatment    Pain Intervention(s) Repositioned; Ambulation/increased activity; Medication (See MAR)  -BP     Row Name 12/08/21 0827          Bed Mobility    Supine-Sit  [place] (Bed Mobility) supervision  -     Assistive Device (Bed Mobility) head of bed elevated  -     Row Name 12/08/21 0827          Transfers    Transfers stand-sit transfer; sit-stand transfer  -BP     Sit-Stand [place] (Transfers) supervision  -     Stand-Sit [place] (Transfers) supervision  -     Row Name 12/08/21 0827          Sit-Stand Transfer    Assistive Device (Sit-Stand Transfers) walker, front-wheeled  -BP     Row Name 12/08/21 0827          Stand-Sit Transfer    Assistive Device (Stand-Sit Transfers) walker, front-wheeled  -BP     Row Name 12/08/21 0827          Gait/Stairs (Locomotion)    [place] Level (Gait) standby assist; supervision; verbal cues  -     Assistive Device (Gait) walker, front-wheeled  -BP     Distance in Feet (Gait) 100  seated break followed by an additional 200  -BP     Pattern (Gait) swing-through  -BP     Deviations/Abnormal Patterns (Gait) gait speed decreased; stride length decreased  -BP     Bilateral Gait Deviations forward flexed posture  -BP     [place] Level (Stairs) contact guard  -     Handrail Location (Stairs) right side (ascending)  -BP     Number of Steps (Stairs) 12  -BP     Comment (Gait/Stairs) Patient requires intermittent verbal cues for improved distance to AD. No loss of balance noted. Intermittent knee buckling, able to compensate with use of FWW. Patient able to ascend/descend stairs with cues initially.  -     Row Name 12/08/21 0827          Safety Issues, Functional Mobility    Comment, Safety Issues/Impairments (Mobility) WFL  -     Row Name 12/08/21 0827          Balance    Comment, Balance Sitting balance wit independence, SBS/supervision for standing with device  -     Row Name 12/08/21 0827          Motor Skills    Therapeutic Exercise --  R LE TKA HEP x 10 reps, provided and reviewed handout  -     Row Name             Wound 12/07/21 0802 Right knee Incision    Wound - Properties Group Placement Date:  12/07/21  -KD Placement Time: 0802 -KD Side: Right  -KD Location: knee  -KD Primary Wound Type: Incision  -KD Additional Comments: exofin; telfa; webril; ace; immob; ice wrap  -KD     Retired Wound - Properties Group Date first assessed: 12/07/21  -KD Time first assessed: 0802 -KD Side: Right  -KD Location: knee  -KD Primary Wound Type: Incision  -KD Additional Comments: exofin; telfa; webril; ace; immob; ice wrap  -KD     Row Name 12/08/21 0827          Bed Mobility Goal 1 (PT)    Activity/Assistive Device (Bed Mobility Goal 1, PT) bed mobility activities, all  -BP     [place] Level/Cues Needed (Bed Mobility Goal 1, PT) supervision required  -BP     Time Frame (Bed Mobility Goal 1, PT) 2 days  -BP     Progress/Outcomes (Bed Mobility Goal 1, PT) goal met  -BP     Row Name 12/08/21 0827          Transfer Goal 1 (PT)    Activity/Assistive Device (Transfer Goal 1, PT) transfers, all; walker, rolling  -BP     [place] Level/Cues Needed (Transfer Goal 1, PT) supervision required  -BP     Time Frame (Transfer Goal 1, PT) 2 days  -BP     Progress/Outcome (Transfer Goal 1, PT) goal met  -BP     Row Name 12/08/21 0827          Gait Training Goal 1 (PT)    Activity/Assistive Device (Gait Training Goal 1, PT) gait (walking locomotion); walker, rolling  -BP     [place] Level (Gait Training Goal 1, PT) supervision required  -BP     Distance (Gait Training Goal 1, PT) 100  -BP     Time Frame (Gait Training Goal 1, PT) 2 days  -BP     Progress/Outcome (Gait Training Goal 1, PT) goal ongoing; goal met  -BP     Row Name 12/08/21 0827          Stairs Goal 1 (PT)    Activity/Assistive Device (Stairs Goal 1, PT) ascending stairs; descending stairs; using handrail, right  -BP     Number of Stairs (Stairs Goal 1, PT) 14  -BP     Time Frame (Stairs Goal 1, PT) 2 days  -BP     Progress/Outcome (Stairs Goal 1, PT) goal met  -BP     Row Name 12/08/21 0827          Positioning and Restraints    Pre-Treatment Position in bed   -BP     Post Treatment Position chair  -BP     In Chair notified nsg; reclined; call light within reach; encouraged to call for assist; with family/caregiver  -BP     Row Name 12/08/21 0827          Progress Summary (PT)    Progress Toward Functional Goals (PT) prepare for discharge  -BP     Row Name 12/08/21 0827          Therapy Plan Review/Discharge Plan (PT)    Therapy Plan Review (PT) patient; risks/benefits reviewed; current/potential barriers reviewed; participants included  -BP           User Key  (r) = Recorded By, (t) = Taken By, (c) = Cosigned By    Initials Name Provider Type    KD Gisela Martinez, RN Registered Nurse    BP Ariadna Hahn, PT Physical Therapist                  Physical Therapy Education                 Title: PT OT SLP Therapies (Done)     Topic: Physical Therapy (Resolved)     Point: Mobility training (Resolved)     Learning Progress Summary           Patient Acceptance, E,TB, VU by BP at 12/8/2021 1013    Acceptance, E,TB, VU by BP at 12/7/2021 1425                   Point: Home exercise program (Resolved)     Learning Progress Summary           Patient Acceptance, E,TB, VU by BP at 12/8/2021 1013                               User Key     Initials Effective Dates Name Provider Type Discipline     06/16/21 -  Ariadna Hahn, PT Physical Therapist PT                PT Recommendation and Plan  Anticipated Discharge Disposition (PT): home with outpatient therapy services  Planned Therapy Interventions (PT): balance training, bed mobility training, gait training, home exercise program, patient/family education, stair training, strengthening, stretching, transfer training  Therapy Frequency (PT): 2 times/day  Progress Summary (PT)  Progress Toward Functional Goals (PT): prepare for discharge  Plan of Care Reviewed With: patient  Outcome Summary: PT Evaluation Complete: Patient performs supine to/from sit transfers with CGA and sit to/from stand transfers from elevated bed surface with  CGA with use of FWW. Patient incontinent of bladder in standing from due to residual effects from surgical block. Patient became nauseated and lightheaded while in standing. Unable to initiate gait training at this time. Notified RN. BP assessed following transfrs in supine, 107/64. Patient would benefit from physical therapy to address deficits in funcitonal mobility and LE strength/ROM. Recommend outpatient PT at discharge.     Outcome Measures     Row Name 12/08/21 0827             How much help from another person do you currently need...    Turning from your back to your side while in flat bed without using bedrails? 4  -BP      Moving from lying on back to sitting on the side of a flat bed without bedrails? 4  -BP      Moving to and from a bed to a chair (including a wheelchair)? 3  -BP      Standing up from a chair using your arms (e.g., wheelchair, bedside chair)? 3  -BP      Climbing 3-5 steps with a railing? 3  -BP      To walk in hospital room? 3  -BP      AM-PAC 6 Clicks Score (PT) 20  -BP              PADD    Diagnosis 1  -BP      Gender 1  -BP      Age Group 2  -BP      Gait Distance 1  -BP      Assist Level 2  -BP      Home Support 3  -BP      PADD Score 10  -BP      Patient Preference home with outpatient rehab  -BP      Prediction by PADD Score directly home (with home health or out-patient rehab)  -BP              Functional Assessment    Outcome Measure Options AM-PAC 6 Clicks Basic Mobility (PT); PADD  -BP            User Key  (r) = Recorded By, (t) = Taken By, (c) = Cosigned By    Initials Name Provider Type    BP Ariadna Hahn, PT Physical Therapist                 Time Calculation:    PT Charges     Row Name 12/08/21 1016             Time Calculation    Start Time 0827  -BP      Stop Time 0858  -BP      Time Calculation (min) 31 min  -BP      PT Received On 12/08/21  -BP              Timed Charges    86289 - PT Therapeutic Exercise Minutes 13  -BP      23645 - Gait Training Minutes  18   -BP              Total Minutes    Timed Charges Total Minutes 31  -BP       Total Minutes 31  -BP            User Key  (r) = Recorded By, (t) = Taken By, (c) = Cosigned By    Initials Name Provider Type    Ariadna [place], YEVGENIY Physical Therapist              Therapy Charges for Today     Code Description Service Date Service Provider Modifiers Qty    13039758875 HC PT EVAL LOW COMPLEXITY 2 12/7/2021 Ariadna Hahn, PT GP 1    59213499872 HC PT THER PROC EA 15 MIN 12/8/2021 Ariadna Hahn, PT GP 1    36190386676 HC GAIT TRAINING EA 15 MIN 12/8/2021 Ariadna Hahn, PT GP 1          PT G-Codes  Outcome Measure Options: AM-PAC 6 Clicks Basic Mobility (PT), PADD  AM-PAC 6 Clicks Score (PT): 20  AM-PAC 6 Clicks Score (OT): 21    PT Discharge Summary  Anticipated Discharge Disposition (PT): home with outpatient therapy services  Reason for Discharge: All goals achieved  Outcomes Achieved: Able to achieve all goals within established timeline  Discharge Destination: Home with outpatient services    Ariadna Hahn, YEVGENIY  12/8/2021

## 2021-12-08 NOTE — PROGRESS NOTES
"Adult Nutrition  Assessment/PES    Patient Name:  Jennifer Arndt  YOB: 1960  MRN: [id number]  Admit Date:  12/7/2021    Assessment Date:  12/8/2021    Comments:  Diet education provided (see below):     Reason for Assessment     Row Name 12/08/21 1158          Reason for Assessment    Reason For Assessment other (see comments)  pt asked about renal diet on rounds, limiting sodium and how much protein to have                Nutrition/Diet History     Row Name 12/08/21 1159          Nutrition/Diet History    Typical Food/Fluid Intake per pt told by MD to \"watch protein and sodium\" due to kidneys                Anthropometrics     Row Name 12/08/21 1159          Anthropometrics    Height --  172.7 cm (68\")     Weight --  192.8 lb            Body Mass Index (BMI)    BMI Assessment BMI 25-29.9: overweight  29.3                Labs/Tests/Procedures/Meds     Row Name 12/08/21 1201          Labs/Procedures/Meds    Lab Results Reviewed reviewed     Lab Results Comments glucose 209, [id number]=7.9            Medications    Pertinent Medications Reviewed reviewed                  Estimated/Assessed Needs     Row Name 12/08/21 1202 12/08/21 1159       Calculation Measurements    Height -- --  172.7 cm (68\")       Estimated/Assessed Needs    Additional Documentation Calorie Requirements (Group); [place]-St. Jeor Equation (Group); Fluid Requirements (Group); Protein Requirements (Group) --       Calorie Requirements    Estimated Calorie Need Method [place]-St Jeor  0-1.2 activity factor --    Estimated Calorie Requirement Comment 1,186-1,786 --       Protein Requirements    Est Protein Requirement Amount (gms/kg) 0.8 gm protein  70 grams --       Fluid Requirements    Estimated Fluid Requirement Method RDA Method  1,742-1,676 --               Nutrition Prescription Ordered     Row Name 12/08/21 1203          Nutrition Prescription PO    Current PO Diet Regular                Evaluation of Received Nutrient/Fluid " Intake     Row Name 12/08/21 1205          Fluid Intake Evaluation    Oral Fluid (mL) --  insufficient data            PO Evaluation    Number of Meals 1     % PO Intake 50%                     Problem/Interventions:   Problem 1     Row Name 12/08/21 1205          Nutrition Diagnoses Problem 1    Problem 1 Knowledge Deficit     Etiology (related to) MNT for Treatment/Condition     Signs/Symptoms (evidenced by) Report/Observation                      Intervention Goal     Row Name 12/08/21 1206          Intervention Goal    General Provide information regarding MNT for treatment/condition     PO Establish PO; PO intake (%)     PO Intake % 75 %  or greater of meals                Nutrition Intervention     Row Name 12/08/21 1207          Nutrition Intervention    RD/Tech Action Encourage intake; Follow Tx progress                  Education/Evaluation     Row Name 12/08/21 1207          Education    Education Provided education regarding  low sodium, protein recommendations with kidney failure, pt receptive to education, demonstrated good understanding via teachback            Monitor/Evaluation    Monitor PO intake; Pertinent labs; Weight; Skin status     Education Follow-up --  provided with handouts:  Quick Starts to Reduce Salt, Seasoning Your Food, Food Labeling, Easy Read for Low Sodium, RD contact                 Electronically signed by:  Kristin Garg RD  12/08/21 12:23 EST

## 2021-12-09 ENCOUNTER — READMISSION MANAGEMENT (OUTPATIENT)
Dept: CALL CENTER | Facility: HOSPITAL | Age: 61
End: 2021-12-09

## 2021-12-09 NOTE — OUTREACH NOTE
Prep Survey      Responses   [facility] facility patient discharged from? [place]   Is LACE score < 7 ? Yes   Emergency Room discharge w/ pulse ox? No   Eligibility Readm Mgmt   Discharge diagnosis TOTAL KNEE ARTHROPLASTY    Does the patient have one of the following disease processes/diagnoses(primary or secondary)? Total Joint Replacement   Does the patient have Home health ordered? No   Is there a DME ordered? No   Prep survey completed? Yes          Esha Adamson RN

## 2021-12-10 ENCOUNTER — APPOINTMENT (OUTPATIENT)
Dept: PHYSICAL THERAPY | Facility: HOSPITAL | Age: 61
End: 2021-12-10

## 2021-12-10 ENCOUNTER — HOSPITAL ENCOUNTER (OUTPATIENT)
Dept: PHYSICAL THERAPY | Facility: HOSPITAL | Age: 61
Setting detail: THERAPIES SERIES
Discharge: HOME | End: 2021-12-10

## 2021-12-10 DIAGNOSIS — Z96.651 STATUS POST TOTAL KNEE REPLACEMENT, RIGHT: Primary | ICD-10-CM

## 2021-12-10 PROCEDURE — 97161 PT EVAL LOW COMPLEX 20 MIN: CPT

## 2021-12-10 NOTE — THERAPY EVALUATION
"    Outpatient Physical Therapy Ortho Initial Evaluation   [place]     Patient Name: Jennifer Arndt  : 1960  MRN: [id number]  Today's Date: 12/10/2021      Visit Date: 12/10/2021    Patient Active Problem List   Diagnosis   • Acute pyelonephritis   • Left ureteral stone   • Abnormal serum total protein level   • Primary osteoarthritis of right knee   • Pain        Past Medical History:   Diagnosis Date   • Anesthesia     PT STATES SENSITIVE TO AND B/P DROPS   • Arthritis     GENERALIZED   • Bruises easily    • CKD (chronic kidney disease)     Stage 3   • Depression    • Emphysema lung (HCC)     WAS TOLD SHE HAD THIS PER CT SCAN MAY 2018.... NO INHALERS   • Fibroids    • GERD (gastroesophageal reflux disease)    • H/O menorrhagia    • Hiatal hernia    • Hyperlipidemia    • Kidney calculi    • Kidney stone    • Recurrent UTI    • Right knee pain     \"BONE ON BONE\"   • Sjogren's disease (HCC)    • Spinal headache     WITH         Past Surgical History:   Procedure Laterality Date   • BREAST FIBROADENOMA SURGERY Left     4 cysts removed-all benign   • BREAST SURGERY     •  SECTION      x3   • CHOLECYSTECTOMY      LAP   • COLONOSCOPY  2018   • CYSTOSCOPY N/A 2016    Procedure: CYSTOSCOPY;  Surgeon: Mikey Aguirre MD;  Location: [facility] OR;  Service:    • HYSTERECTOMY      Dr. Foster-menorrhagia   • KIDNEY STONE SURGERY     • TOTAL KNEE ARTHROPLASTY Right 2021    Procedure: TOTAL KNEE ARTHROPLASTY AND ALL ASSOCIATED PROCEDURES;  Surgeon: John Chan MD;  Location: [facility] OR;  Service: Orthopedics;  Laterality: Right;   • URETEROSCOPY LASER LITHOTRIPSY WITH STENT INSERTION Left 2016    Procedure: lt URETEROSCOPY LASER LITHOTRIPSY WITH stone basket extraction w/STENT INSERTION;  Surgeon: Hasmukh Krueger MD;  Location: [facility] OR;  Service:    • URETEROSCOPY LASER LITHOTRIPSY WITH STENT INSERTION Left 3/1/2019    Procedure: LEFT URETEROSCOPY LASER " LITHOTRIPSY STONE BASKET EXTRACTION STENT;  Surgeon: Hasmukh Krueger MD;  Location: [facility] OR;  Service: Urology   • URETEROSCOPY LASER LITHOTRIPSY WITH STENT INSERTION Left 3/15/2019    Procedure: LEFT URETEROSCOPY LASER LITHOTRIPSY WITH STENT INSERTION STONE BASKET EXTRACTION;  Surgeon: Hasmukh Krueger MD;  Location: [facility] OR;  Service: Urology   • WISDOM TOOTH EXTRACTION         Visit Dx:     ICD-10-CM ICD-9-CM   1. Status post total knee replacement, right  Z96.651 V43.65          Patient History     Row Name 12/10/21 0800             History    Chief Complaint Difficulty Walking; Difficulty with daily activities; Joint stiffness; Joint swelling; Muscle tenderness; Muscle weakness; Pain; Swelling  -AS      Type of Pain Knee pain  -AS      Date Current Problem(s) Began 12/07/21  -AS      Brief Description of Current Complaint Jennifer Arndt presents to outpatient PT s/p right TKA performed on 12-7-21 by Dr. Chan. Patient is currently ambulating with FWW and is WBAT. Patient has ace wrap on right knee and states her pain is controlled with medication and ice.  -AS      Previous treatment for THIS PROBLEM Surgery  -AS      Surgery Date: 12/07/21  -AS      Patient/Caregiver Goals Relieve pain; Return to prior level of function; Improve mobility; Improve strength; Decrease swelling; Know what to do to help the symptoms  -AS      Patient's Rating of General Health Good  -AS      Patient seeing anyone else for problem(s)? Dr. Chan  -AS      How has patient tried to help current problem? rest, ice, pain meds  -AS      What clinical tests have you had for this problem? X-ray  -AS      Results of Clinical Tests right knee OA  -AS      Surgery/Hospitalization Right TKA  -AS              Pain     Pain Location Knee  -AS      Pain at Present 10  -AS      Pain at Best 3  -AS      Pain at Worst 10  -AS      Pain Frequency Constant/continuous  -AS      Pain Description Aching  -AS      What Performance  Factors Make the Current Problem(s) WORSE? walking, bending knee  -AS      What Performance Factors Make the Current Problem(s) BETTER? rest  -AS              Daily Activities    Primary Language English  -AS      Are you able to read Yes  -AS      Are you able to write Yes  -AS      How does patient learn best? Listening; Reading; Demonstration  -AS      Teaching needs identified Home Exercise Program; Management of Condition  -AS      Patient is concerned about/has problems with Climbing Stairs; Difficulty with self care (i.e. bathing, dressing, toileting:; Flexibility; Performing home management (household chores, shopping, care of dependents); Performing job responsibilities/community activities (work, school,; Performing sports, recreation, and play activities; Walking  -AS      Does patient have problems with the following? None  -AS      Barriers to learning None  -AS      Pt Participated in POC and Goals Yes  -AS              Safety    Are you being hurt, hit, or frightened by anyone at home or in your life? No  -AS      Are you being neglected by a caregiver No  -AS            User Key  (r) = Recorded By, (t) = Taken By, (c) = Cosigned By    Initials Name Provider Type    AS Robert Lang, PT Physical Therapist                 PT Ortho     Row Name 12/10/21 0800       Precautions and Contraindications    Precautions/Limitations no known precautions/limitations  -AS       Posture/Observations    Posture- WNL Posture is WNL  -AS    Observations Ecchymosis/bruising; Edema; Incision healing  -AS       Patellar Accessory Motions    Superior glide Right:; WNL  -AS    Inferior glide Right:; WNL  -AS    Medial glide Right:; WNL  -AS    Lateral glide Right:; WNL  -AS       Right Lower Ext    Rt Knee Extension/Flexion AROM 0-4-116 post stretch  0-13-94 pre stretch  -AS       MMT Right Lower Ext    Rt Hip Flexion MMT, Gross Movement (4-/5) good minus  -AS    Rt Hip Extension MMT, Gross Movement (4-/5) good  minus  -AS    Rt Hip ABduction MMT, Gross Movement (4-/5) good minus  -AS    Rt Knee Extension MMT, Gross Movement (4-/5) good minus  -AS    Rt Knee Flexion MMT, Gross Movement (4-/5) good minus  -AS       Sensation    Sensation WNL? WNL  -AS    Light Touch No apparent deficits  -AS       Lower Extremity Flexibility    Hamstrings Right:; Mildly limited  -AS       Gait/Stairs (Locomotion)    Comment (Gait/Stairs) Patient ambulates with a FWW and an antalgic gait pattern and slow gait speed. Patient has decreased heel strike with shortened step and stride length on her right side.  -AS          User Key  (r) = Recorded By, (t) = Taken By, (c) = Cosigned By    Initials Name Provider Type    AS Robert Lang, PT Physical Therapist                            Therapy Education  Given: HEP, Symptoms/condition management, Pain management, Mobility training, Edema management  Program: New  How Provided: Verbal, Demonstration, Written  Provided to: Patient  Level of Understanding: Teach back education performed, Verbalized, Demonstrated      PT OP Goals     Row Name 12/10/21 0800          PT Short Term Goals    STG Date to Achieve 12/31/21  -AS     STG 1 Patient to demonstrate compliance with her initial HEP for flexibility, ROM and strengthening.  -AS     STG 2 Patient to report right knee pain on VAS of 5-6/10 at worst with activity.  -AS     STG 3 Patient to demonstrate improved right knee and RLE strength to 4/5 in all planes.  -AS     STG 4 Patient to demonstrate improved right knee ROM to 0-1-120 degrees.  -AS     STG 5 Patient to ambulate short distances without the use of an AD and with an improved gait pattern.  -AS            Long Term Goals    LTG Date to Achieve 01/21/22  -AS     LTG 1 Patient to demonstrate compliance with her advanced HEP for flexibility, ROM and strengthening.  -AS     LTG 2 Patient to report right knee pain on VAS of 2-3/10 at worst with activity.  -AS     LTG 3 Patient to demonstrate  improved right knee and RLE strength to 4+/5 in all planes.  -AS     LTG 4 Patient to demonstrate improved right knee ROM to 0-125 degrees.  -AS     LTG 5 Patient to ambulate community distances without an AD and with a normal gait pattern.  -AS     LTG 6 Patient to report overall improved function on LEFS to 55/80.  -AS            Time Calculation    PT Goal Re-Cert Due Date 01/07/22  -AS           User Key  (r) = Recorded By, (t) = Taken By, (c) = Cosigned By    Initials Name Provider Type    AS Robert Lang, PT Physical Therapist                 PT Assessment/Plan     Row Name 12/10/21 0800          PT Assessment    Functional Limitations Impaired gait; Impaired locomotion; Limitation in home management; Limitations in community activities; Performance in leisure activities; Performance in self-care ADL; Performance in sport activities; Performance in work activities  -AS     Impairments Edema; Gait; Impaired flexibility; Muscle strength; Pain; Range of motion  -AS     Assessment Comments Patient reports to outpatient PT s/p right TKA performed on 12-7-21 by Dr. Chan. Patient is currently ambulating with FWW and is WBAT at this time. Patient has limited right knee ROM, limited right LE strength, and increased right knee pain and edema with activity. Patient has limited function at this time secondary to the above.  -AS     Please refer to paper survey for additional self-reported information Yes  -AS     Rehab Potential Good  -AS     Patient/caregiver participated in establishment of treatment plan and goals Yes  -AS     Patient would benefit from skilled therapy intervention Yes  -AS            PT Plan    PT Frequency 2x/week  -AS     Predicted Duration of Therapy Intervention (PT) 6-8 weeks  -AS     Planned CPT's? PT RE-EVAL: 78892; PT THER PROC EA 15 MIN: 44349; PT THER ACT EA 15 MIN: 75224; PT MANUAL THERAPY EA 15 MIN: 80523; PT NEUROMUSC RE-EDUCATION EA 15 MIN: 12158; PT GAIT TRAINING EA 15 MIN:  16212  -AS           User Key  (r) = Recorded By, (t) = Taken By, (c) = Cosigned By    Initials Name Provider Type    AS Robert Lang, PT Physical Therapist                   OP Exercises     Row Name 12/10/21 0800             Subjective Pain    Able to rate subjective pain? yes  -AS      Pre-Treatment Pain Level 10  -AS      Post-Treatment Pain Level 6  -AS              Exercise 1    Exercise Name 1 Heel Slides  -AS      Time 1 8 min  -AS              Exercise 2    Exercise Name 2 Wall Slides  -AS              Exercise 3    Exercise Name 3 Bike  -AS              Exercise 4    Exercise Name 4 Manual Flexion  -AS      Reps 4 10  -AS      Time 4 10 sec hold each  -AS              Exercise 5    Exercise Name 5 HS Stretch  -AS      Reps 5 10  -AS      Time 5 10 sec hold each  -AS              Exercise 6    Exercise Name 6 Heel Prop  -AS      Time 6 5 min  -AS              Exercise 7    Exercise Name 7 Manual Extension  -AS      Reps 7 10  -AS      Time 7 10 sec hold each  -AS              Exercise 8    Exercise Name 8 QS  -AS      Reps 8 25  -AS      Time 8 5 sec hold each  -AS              Exercise 9    Exercise Name 9 SAQ  -AS      Reps 9 25  -AS              Exercise 10    Exercise Name 10 3-Way Hip  -AS      Reps 10 25 each  -AS              Exercise 11    Exercise Name 11 LAQ  -AS      Reps 11 25  -AS            User Key  (r) = Recorded By, (t) = Taken By, (c) = Cosigned By    Initials Name Provider Type    AS Robert Lang, PT Physical Therapist                              Outcome Measure Options: Lower Extremity Functional Scale (LEFS)  Lower Extremity Functional Index  Any of your usual work, housework or school activities: Extreme difficulty or unable to perform activity  Your usual hobbies, recreational or sporting activities: Extreme difficulty or unable to perform activity  Getting into or out of the bath: Quite a bit of difficulty  Walking between rooms: Quite a bit of difficulty  Putting  on your shoes or socks: Quite a bit of difficulty  Squatting: Extreme difficulty or unable to perform activity  Lifting an object, like a bag of groceries from the floor: Extreme difficulty or unable to perform activity  Performing light activities around your home: Quite a bit of difficulty  Performing heavy activities around your home: Quite a bit of difficulty  Getting into or out of a car: Extreme difficulty or unable to perform activity  Walking 2 blocks: Extreme difficulty or unable to perform activity  Walking a mile: Quite a bit of difficulty  Going up or down 10 stairs (about 1 flight of stairs): Quite a bit of difficulty  Standing for 1 hour: Moderate difficulty  Sitting for 1 hour: Quite a bit of difficulty  Running on even ground: Extreme difficulty or unable to perform activity  Running on uneven ground: Extreme difficulty or unable to perform activity  Making sharp turns while running fast: Extreme difficulty or unable to perform activity  Hopping: Extreme difficulty or unable to perform activity  Rolling over in bed: Quite a bit of difficulty  Total: 11      Time Calculation:     Start Time: 0830  Stop Time: 0923  Time Calculation (min): 53 min     Therapy Charges for Today     Code Description Service Date Service Provider Modifiers Qty    60314502055 HC PT EVAL LOW COMPLEXITY 4 12/10/2021 Robert Lang, PT GP 1          PT G-Codes  Outcome Measure Options: Lower Extremity Functional Scale (LEFS)  Total: 11         Robert Lang, PT  12/10/2021

## 2021-12-13 ENCOUNTER — HOSPITAL ENCOUNTER (OUTPATIENT)
Dept: PHYSICAL THERAPY | Facility: HOSPITAL | Age: 61
Setting detail: THERAPIES SERIES
Discharge: HOME | End: 2021-12-13

## 2021-12-13 DIAGNOSIS — Z96.651 STATUS POST TOTAL KNEE REPLACEMENT, RIGHT: Primary | ICD-10-CM

## 2021-12-13 PROCEDURE — 97110 THERAPEUTIC EXERCISES: CPT

## 2021-12-13 PROCEDURE — 97140 MANUAL THERAPY 1/> REGIONS: CPT

## 2021-12-13 NOTE — THERAPY TREATMENT NOTE
"    Outpatient Physical Therapy Ortho Treatment Note   [facility]     Patient Name: Jennifer Arndt  : 1960  MRN: [id number]  Today's Date: 2021      Visit Date: 2021    Visit Dx:    ICD-10-CM ICD-9-CM   1. Status post total knee replacement, right  Z96.651 V43.65       Patient Active Problem List   Diagnosis   • Acute pyelonephritis   • Left ureteral stone   • Abnormal serum total protein level   • Primary osteoarthritis of right knee   • Pain        Past Medical History:   Diagnosis Date   • Anesthesia     PT STATES SENSITIVE TO AND B/P DROPS   • Arthritis     GENERALIZED   • Bruises easily    • CKD (chronic kidney disease)     Stage 3   • Depression    • Emphysema lung (HCC)     WAS TOLD SHE HAD THIS PER CT SCAN MAY 2018.... NO INHALERS   • Fibroids    • GERD (gastroesophageal reflux disease)    • H/O menorrhagia    • Hiatal hernia    • Hyperlipidemia    • Kidney calculi    • Kidney stone    • Recurrent UTI    • Right knee pain     \"BONE ON BONE\"   • Sjogren's disease (HCC)    • Spinal headache     WITH         Past Surgical History:   Procedure Laterality Date   • BREAST FIBROADENOMA SURGERY Left     4 cysts removed-all benign   • BREAST SURGERY     •  SECTION      x3   • CHOLECYSTECTOMY  2007    LAP   • COLONOSCOPY  2018   • CYSTOSCOPY N/A 2016    Procedure: CYSTOSCOPY;  Surgeon: Mikey Aguirre MD;  Location: [facility];  Service:    • HYSTERECTOMY      Dr. Foster-menorrhagia   • KIDNEY STONE SURGERY     • TOTAL KNEE ARTHROPLASTY Right 2021    Procedure: TOTAL KNEE ARTHROPLASTY AND ALL ASSOCIATED PROCEDURES;  Surgeon: John Chan MD;  Location: [facility] OR;  Service: Orthopedics;  Laterality: Right;   • URETEROSCOPY LASER LITHOTRIPSY WITH STENT INSERTION Left 2016    Procedure: lt URETEROSCOPY LASER LITHOTRIPSY WITH stone basket extraction w/STENT INSERTION;  Surgeon: Hasmukh Krueger MD;  Location: [facility] OR;  Service:    • " URETEROSCOPY LASER LITHOTRIPSY WITH STENT INSERTION Left 3/1/2019    Procedure: LEFT URETEROSCOPY LASER LITHOTRIPSY STONE BASKET EXTRACTION STENT;  Surgeon: Hasmukh Krueger MD;  Location: [facility] OR;  Service: Urology   • URETEROSCOPY LASER LITHOTRIPSY WITH STENT INSERTION Left 3/15/2019    Procedure: LEFT URETEROSCOPY LASER LITHOTRIPSY WITH STENT INSERTION STONE BASKET EXTRACTION;  Surgeon: Hasmukh Krueger MD;  Location: [facility] OR;  Service: Urology   • WISDOM TOOTH EXTRACTION                          PT Assessment/Plan     Row Name 12/13/21 1005          PT Assessment    Assessment Comments Pt is progressing well with AROM and strength.  -KM            PT Plan    PT Plan Comments Continue per POC  -KM           User Key  (r) = Recorded By, (t) = Taken By, (c) = Cosigned By    Initials Name Provider Type    Jasmyne Carrera PTA Physical Therapy Assistant                   OP Exercises     Row Name 12/13/21 1005             Subjective Comments    Subjective Comments Pt states she has increased soreness since initial visity. States she has been compliant with HEP  -KM              Exercise 1    Exercise Name 1 Heel Slides  -KM      Time 1 8 min  -KM              Exercise 2    Exercise Name 2 Wall Slides  -KM      Time 2 8 min  -KM              Exercise 3    Exercise Name 3 Bike  -KM              Exercise 4    Exercise Name 4 Manual Flexion  -KM      Reps 4 10  -KM      Time 4 10 sec hold each  -KM              Exercise 5    Exercise Name 5 HS Stretch  -KM      Reps 5 10  -KM      Time 5 10 sec hold each  -KM              Exercise 6    Exercise Name 6 Heel Prop  -KM      Time 6 5 min  -KM              Exercise 7    Exercise Name 7 Manual Extension  -KM      Reps 7 10  -KM      Time 7 10 sec hold each  -KM              Exercise 8    Exercise Name 8 QS  -KM      Reps 8 25  -KM      Time 8 5 sec hold each  -KM              Exercise 9    Exercise Name 9 SAQ  -KM              Exercise 10    Exercise Name  10 3-Way Hip  -KM      Reps 10 25 each  -KM              Exercise 11    Exercise Name 11 LAQ  -KM      Reps 11 25  -KM              Exercise 12    Exercise Name 12 TKE  -KM      Reps 12 25  -KM      Time 12 Gold  -KM            User Key  (r) = Recorded By, (t) = Taken By, (c) = Cosigned By    Initials Name Provider Type    Jasmyne Carrera PTA Physical Therapy Assistant                                                Time Calculation:   Start Time: 1005  Stop Time: 1058  Time Calculation (min): 53 min  Therapy Charges for Today     Code Description Service Date Service Provider Modifiers Qty    78058778506 HC PT MANUAL THERAPY EA 15 MIN 12/13/2021 Jasmyne Aguila PTA GP 1    19034425261 HC PT THER PROC EA 15 MIN 12/13/2021 Jasmyne Aguila PTA GP 1                    Jasmyne Aguila PTA  12/13/2021

## 2021-12-16 ENCOUNTER — HOSPITAL ENCOUNTER (OUTPATIENT)
Dept: PHYSICAL THERAPY | Facility: HOSPITAL | Age: 61
Setting detail: THERAPIES SERIES
Discharge: HOME | End: 2021-12-16

## 2021-12-16 DIAGNOSIS — Z96.651 STATUS POST TOTAL KNEE REPLACEMENT, RIGHT: Primary | ICD-10-CM

## 2021-12-16 PROCEDURE — 97110 THERAPEUTIC EXERCISES: CPT

## 2021-12-16 PROCEDURE — 97140 MANUAL THERAPY 1/> REGIONS: CPT

## 2021-12-16 NOTE — THERAPY TREATMENT NOTE
"    Outpatient Physical Therapy Ortho Treatment Note   [facility]     Patient Name: Jennifer Arndt  : 1960  MRN: [id number]  Today's Date: 2021      Visit Date: 2021    Visit Dx:    ICD-10-CM ICD-9-CM   1. Status post total knee replacement, right  Z96.651 V43.65       Patient Active Problem List   Diagnosis   • Acute pyelonephritis   • Left ureteral stone   • Abnormal serum total protein level   • Primary osteoarthritis of right knee   • Pain        Past Medical History:   Diagnosis Date   • Anesthesia     PT STATES SENSITIVE TO AND B/P DROPS   • Arthritis     GENERALIZED   • Bruises easily    • CKD (chronic kidney disease)     Stage 3   • Depression    • Emphysema lung (HCC)     WAS TOLD SHE HAD THIS PER CT SCAN MAY 2018.... NO INHALERS   • Fibroids    • GERD (gastroesophageal reflux disease)    • H/O menorrhagia    • Hiatal hernia    • Hyperlipidemia    • Kidney calculi    • Kidney stone    • Recurrent UTI    • Right knee pain     \"BONE ON BONE\"   • Sjogren's disease (HCC)    • Spinal headache     WITH         Past Surgical History:   Procedure Laterality Date   • BREAST FIBROADENOMA SURGERY Left     4 cysts removed-all benign   • BREAST SURGERY     •  SECTION      x3   • CHOLECYSTECTOMY  2007    LAP   • COLONOSCOPY  2018   • CYSTOSCOPY N/A 2016    Procedure: CYSTOSCOPY;  Surgeon: Mikey Aguirre MD;  Location: [facility];  Service:    • HYSTERECTOMY      Dr. Foster-menorrhagia   • KIDNEY STONE SURGERY     • TOTAL KNEE ARTHROPLASTY Right 2021    Procedure: TOTAL KNEE ARTHROPLASTY AND ALL ASSOCIATED PROCEDURES;  Surgeon: John Chan MD;  Location: [facility] OR;  Service: Orthopedics;  Laterality: Right;   • URETEROSCOPY LASER LITHOTRIPSY WITH STENT INSERTION Left 2016    Procedure: lt URETEROSCOPY LASER LITHOTRIPSY WITH stone basket extraction w/STENT INSERTION;  Surgeon: Hasmukh Krueger MD;  Location: [facility] OR;  Service:    • " URETEROSCOPY LASER LITHOTRIPSY WITH STENT INSERTION Left 3/1/2019    Procedure: LEFT URETEROSCOPY LASER LITHOTRIPSY STONE BASKET EXTRACTION STENT;  Surgeon: Hasmukh Krueger MD;  Location: [facility];  Service: Urology   • URETEROSCOPY LASER LITHOTRIPSY WITH STENT INSERTION Left 3/15/2019    Procedure: LEFT URETEROSCOPY LASER LITHOTRIPSY WITH STENT INSERTION STONE BASKET EXTRACTION;  Surgeon: Hasmukh Krueger MD;  Location: [facility];  Service: Urology   • WISDOM TOOTH EXTRACTION                          PT Assessment/Plan     Row Name 12/16/21 1000          PT Assessment    Assessment Comments Pt is progressing well with flexion ROM , some tightness noted with extension. Pt with limited tolerance to current treatment sessiond due to symptoms reported  -KM            PT Plan    PT Plan Comments Pt to continue with HEP  -KM           User Key  (r) = Recorded By, (t) = Taken By, (c) = Cosigned By    Initials Name Provider Type    Jasmyne Carrera PTA Physical Therapy Assistant                   OP Exercises     Row Name 12/16/21 1000             Subjective Comments    Subjective Comments Pt states her knee is more sore and swollen since previous visit., States she has been using a CPM machine daily.  -KM              Exercise 1    Exercise Name 1 Heel Slides  -KM      Time 1 8 min  -KM              Exercise 2    Exercise Name 2 Wall Slides  -KM      Time 2 8 min  -KM              Exercise 3    Exercise Name 3 Bike  -KM              Exercise 4    Exercise Name 4 Manual Flexion  -KM      Reps 4 10  -KM      Time 4 10 sec hold each  -KM              Exercise 5    Exercise Name 5 HS Stretch  -KM      Reps 5 10  -KM      Time 5 10 sec hold each  -KM              Exercise 6    Exercise Name 6 Heel Prop  -KM      Time 6 5 min  -KM              Exercise 7    Exercise Name 7 Manual Extension  -KM      Reps 7 10  -KM      Time 7 10 sec hold each  -KM              Exercise 8    Exercise Name 8 QS  -KM      Reps 8  25  -KM      Time 8 5 sec hold each  -KM              Exercise 9    Exercise Name 9 SAQ  -KM              Exercise 10    Exercise Name 10 3-Way Hip  -KM      Reps 10 25 each  -KM              Exercise 11    Exercise Name 11 LAQ  -KM      Reps 11 25  -KM              Exercise 12    Exercise Name 12 TKE  -KM      Reps 12 25  -KM      Time 12 Gold  -KM            User Key  (r) = Recorded By, (t) = Taken By, (c) = Cosigned By    Initials Name Provider Type    Jasmyne Carrera PTA Physical Therapy Assistant                                                Time Calculation:   Start Time: 1000  Stop Time: 1058  Time Calculation (min): 58 min  Therapy Charges for Today     Code Description Service Date Service Provider Modifiers Qty    98366622892 HC PT MANUAL THERAPY EA 15 MIN 12/16/2021 Jasmyne Aguila PTA GP 1    84270507141 HC PT THER PROC EA 15 MIN 12/16/2021 Jasmyne Aguila PTA GP 1                    Jasmyne Aguila PTA  12/16/2021

## 2021-12-17 ENCOUNTER — TELEPHONE (OUTPATIENT)
Dept: ORTHOPEDIC SURGERY | Facility: CLINIC | Age: 61
End: 2021-12-17

## 2021-12-17 DIAGNOSIS — Z96.651 S/P TKR (TOTAL KNEE REPLACEMENT), RIGHT: ICD-10-CM

## 2021-12-17 RX ORDER — OXYCODONE HYDROCHLORIDE AND ACETAMINOPHEN 5; 325 MG/1; MG/1
1-2 TABLET ORAL EVERY 4 HOURS PRN
Qty: 42 TABLET | Refills: 0 | Status: SHIPPED | OUTPATIENT
Start: 2021-12-17 | End: 2022-01-19

## 2021-12-17 NOTE — TELEPHONE ENCOUNTER
[place] Key: [id number] - PA Case ID: [id number] - Rx #: [id number] help? Call us at [phone]  Status  Sent to Plan:  today  Drug  oxyCODONE-Acetaminophen 5-325MG tablets  Form  [employer] Electronic PA Form (2017 NCPDP)  Original Claim Info  75 MAX 7 DS PER  90 DAYS THEN PA. PA REQ [id number]  DRUG REQUIRES PRIOR AUTHORIZATION(PHARMACY HELP DESK [phone])    [facility]  oxycodone / acetaminophen  42 tablets 5mg/325mg  Logo of [facility]  $12.79-$13.89  BIN [id number]  [address] [facility]  [id number]  Member ID [id number]    Javi   Coupon • Last updated Dec 17

## 2021-12-17 NOTE — TELEPHONE ENCOUNTER
Patient calling for an RX refill request of pain medication she last filled Oxycodone-acetaminophen (PERCOCET) 5-325) Take 1-2 Tablets by mouth every 4 Hours PRN #42 last filled 12.08.2021.    Status post total knee replacement, right 12.07.2021.    Previous RX pended for your approval change, or denial.    Thanks.

## 2021-12-21 ENCOUNTER — HOSPITAL ENCOUNTER (OUTPATIENT)
Dept: PHYSICAL THERAPY | Facility: HOSPITAL | Age: 61
Setting detail: THERAPIES SERIES
Discharge: HOME | End: 2021-12-21

## 2021-12-21 DIAGNOSIS — Z96.651 STATUS POST TOTAL KNEE REPLACEMENT, RIGHT: Primary | ICD-10-CM

## 2021-12-21 PROCEDURE — 97140 MANUAL THERAPY 1/> REGIONS: CPT

## 2021-12-21 PROCEDURE — 97110 THERAPEUTIC EXERCISES: CPT

## 2021-12-21 NOTE — THERAPY TREATMENT NOTE
"    Outpatient Physical Therapy Ortho Treatment Note   Sandra Wiseman     Patient Name: Jennifer Arndt  : 1960  MRN: [id number]  Today's Date: 2021      Visit Date: 2021    Visit Dx:    ICD-10-CM ICD-9-CM   1. Status post total knee replacement, right  Z96.651 V43.65       Patient Active Problem List   Diagnosis   • Acute pyelonephritis   • Left ureteral stone   • Abnormal serum total protein level   • Primary osteoarthritis of right knee   • Pain        Past Medical History:   Diagnosis Date   • Anesthesia     PT STATES SENSITIVE TO AND B/P DROPS   • Arthritis     GENERALIZED   • Bruises easily    • CKD (chronic kidney disease)     Stage 3   • Depression    • Emphysema lung (HCC)     WAS TOLD SHE HAD THIS PER CT SCAN MAY 2018.... NO INHALERS   • Fibroids    • GERD (gastroesophageal reflux disease)    • H/O menorrhagia    • Hiatal hernia    • Hyperlipidemia    • Kidney calculi    • Kidney stone    • Recurrent UTI    • Right knee pain     \"BONE ON BONE\"   • Sjogren's disease (HCC)    • Spinal headache     WITH         Past Surgical History:   Procedure Laterality Date   • BREAST FIBROADENOMA SURGERY Left     4 cysts removed-all benign   • BREAST SURGERY     •  SECTION      x3   • CHOLECYSTECTOMY  2007    LAP   • COLONOSCOPY  2018   • CYSTOSCOPY N/A 2016    Procedure: CYSTOSCOPY;  Surgeon: Mikey Aguirre MD;  Location: [facility];  Service:    • HYSTERECTOMY      Dr. Foster-menorrhagia   • KIDNEY STONE SURGERY     • TOTAL KNEE ARTHROPLASTY Right 2021    Procedure: TOTAL KNEE ARTHROPLASTY AND ALL ASSOCIATED PROCEDURES;  Surgeon: John Chan MD;  Location: [facility];  Service: Orthopedics;  Laterality: Right;   • URETEROSCOPY LASER LITHOTRIPSY WITH STENT INSERTION Left 2016    Procedure: lt URETEROSCOPY LASER LITHOTRIPSY WITH stone basket extraction w/STENT INSERTION;  Surgeon: Hasmukh rKueger MD;  Location: [facility] OR;  Service:    • " URETEROSCOPY LASER LITHOTRIPSY WITH STENT INSERTION Left 3/1/2019    Procedure: LEFT URETEROSCOPY LASER LITHOTRIPSY STONE BASKET EXTRACTION STENT;  Surgeon: Hasmukh Krueger MD;  Location: [facility] OR;  Service: Urology   • URETEROSCOPY LASER LITHOTRIPSY WITH STENT INSERTION Left 3/15/2019    Procedure: LEFT URETEROSCOPY LASER LITHOTRIPSY WITH STENT INSERTION STONE BASKET EXTRACTION;  Surgeon: Hasmukh Krueger MD;  Location: [facility] OR;  Service: Urology   • WISDOM TOOTH EXTRACTION                          PT Assessment/Plan     Row Name 12/21/21 1037          PT Assessment    Assessment Comments Pt with minimal edema; measures good flexion AROM but limited with extension.  -KM            PT Plan    PT Plan Comments Continue to progress as tolerated  -KM           User Key  (r) = Recorded By, (t) = Taken By, (c) = Cosigned By    Initials Name Provider Type    Jasmyne Carrera, PTA Physical Therapy Assistant                   OP Exercises     Row Name 12/21/21 1037             Subjective Comments    Subjective Comments Pt states she continues to experience increased swelling and soreness. Pt to see MD tomorrow.  -KM              Exercise 1    Exercise Name 1 Heel Slides  -KM      Time 1 8 min  -KM              Exercise 2    Exercise Name 2 Wall Slides  -KM      Time 2 8 min  -KM              Exercise 3    Exercise Name 3 Bike  -KM              Exercise 4    Exercise Name 4 Manual Flexion  -KM      Reps 4 10  -KM      Time 4 10 sec hold each  -KM              Exercise 5    Exercise Name 5 HS Stretch  -KM      Reps 5 10  -KM      Time 5 10 sec hold each  -KM              Exercise 6    Exercise Name 6 Heel Prop  -KM      Time 6 5 min  -KM              Exercise 7    Exercise Name 7 Manual Extension  -KM      Reps 7 10  -KM      Time 7 10 sec hold each  -KM              Exercise 8    Exercise Name 8 QS  -KM      Reps 8 25  -KM      Time 8 5 sec hold each  -KM              Exercise 9    Exercise Name 9 SAQ  -KM               Exercise 10    Exercise Name 10 3-Way Hip  -KM      Reps 10 25 each  -KM              Exercise 11    Exercise Name 11 LAQ  -KM      Reps 11 25  -KM              Exercise 12    Exercise Name 12 TKE  -KM      Reps 12 25  -KM      Time 12 Gold  -KM              Exercise 13    Exercise Name 13 Heel Raises  -KM      Reps 13 25  -KM              Exercise 14    Exercise Name 14 Mini Squats  -KM      Reps 14 25  -KM            User Key  (r) = Recorded By, (t) = Taken By, (c) = Cosigned By    Initials Name Provider Type    Jasmyne Carrera PTA Physical Therapy Assistant                                                Time Calculation:   Start Time: 1037  Stop Time: 1140  Time Calculation (min): 63 min  Therapy Charges for Today     Code Description Service Date Service Provider Modifiers Qty    72524176802 HC PT MANUAL THERAPY EA 15 MIN 12/21/2021 Jasmyne Agiula PTA GP 1    21119673004 HC PT THER PROC EA 15 MIN 12/21/2021 Jasmyne Aguila PTA GP 1                    Jasmyne Aguila PTA  12/21/2021

## 2021-12-22 ENCOUNTER — OFFICE VISIT (OUTPATIENT)
Dept: ORTHOPEDIC SURGERY | Facility: CLINIC | Age: 61
End: 2021-12-22

## 2021-12-22 VITALS — HEIGHT: 68 IN | BODY MASS INDEX: 29.22 KG/M2 | WEIGHT: 192.8 LBS

## 2021-12-22 DIAGNOSIS — Z96.651 S/P TKR (TOTAL KNEE REPLACEMENT), RIGHT: Primary | ICD-10-CM

## 2021-12-22 PROCEDURE — 99024 POSTOP FOLLOW-UP VISIT: CPT | Performed by: NURSE PRACTITIONER

## 2021-12-22 RX ORDER — PREDNISONE 1 MG/1
TABLET ORAL
Qty: 21 TABLET | Refills: 0 | Status: SHIPPED | OUTPATIENT
Start: 2021-12-22 | End: 2022-01-19 | Stop reason: ALTCHOICE

## 2021-12-22 NOTE — PROGRESS NOTES
CC: s/p right total knee arthroplasty, DOS 12/07/2021  Interval History: Jennifer Arndt returns for 2 week postoperative visit.  She is doing well. Pain is controlled with pain medication and is  improving. She denies any wound problem, fevers, or chills. Patient is continuing to work with outpatient PT. Ambulating with cane. Continuing DVT prophylaxis with use of eliquis.     Physical Examination:    Right knee was examined   Incision clean and dry   ROM 0-108, 4/5 strength   Stable to varus and valgus stress   Flex/extend ankle and toes   Positive sensation right foot   No calf pain, negative Homans sign bilaterally   Moderate swelling right knee    Assessment/Plan:  Jennifer Arndt is recovering from surgery as expected.  We will continue outpatient therapy for range of motion, strengthening, and gait normalization.    She is to follow up in clinic in 4 weeks with xrays. Patient had all question answered today. Will continue DVT prophylaxis for an additional 2 weeks. Discussed with patient to avoid immersing incision for 4 weeks total after surgery.     Medications:  New Medications Ordered This Visit   Medications   • predniSONE (DELTASONE) 5 MG tablet     Sig: Take as directed     Dispense:  21 tablet     Refill:  0       FERMÍN Martinez

## 2021-12-23 ENCOUNTER — HOSPITAL ENCOUNTER (OUTPATIENT)
Dept: PHYSICAL THERAPY | Facility: HOSPITAL | Age: 61
Setting detail: THERAPIES SERIES
Discharge: HOME | End: 2021-12-23

## 2021-12-23 DIAGNOSIS — Z96.651 STATUS POST TOTAL KNEE REPLACEMENT, RIGHT: Primary | ICD-10-CM

## 2021-12-23 PROCEDURE — 97140 MANUAL THERAPY 1/> REGIONS: CPT

## 2021-12-23 PROCEDURE — 97110 THERAPEUTIC EXERCISES: CPT

## 2021-12-23 NOTE — THERAPY TREATMENT NOTE
"    Outpatient Physical Therapy Ortho Treatment Note   Sandra Wiseman     Patient Name: Jennifer Arndt  : 1960  MRN: [id number]  Today's Date: 2021      Visit Date: 2021    Visit Dx:    ICD-10-CM ICD-9-CM   1. Status post total knee replacement, right  Z96.651 V43.65       Patient Active Problem List   Diagnosis   • Acute pyelonephritis   • Left ureteral stone   • Abnormal serum total protein level   • Primary osteoarthritis of right knee   • Pain   • S/P TKR (total knee replacement), right, DOS         Past Medical History:   Diagnosis Date   • Anesthesia     PT STATES SENSITIVE TO AND B/P DROPS   • Arthritis     GENERALIZED   • Bruises easily    • CKD (chronic kidney disease)     Stage 3   • Depression    • Emphysema lung (HCC)     WAS TOLD SHE HAD THIS PER CT SCAN MAY 2018.... NO INHALERS   • Fibroids    • GERD (gastroesophageal reflux disease)    • H/O menorrhagia    • Hiatal hernia    • Hyperlipidemia    • Kidney calculi    • Kidney stone    • Recurrent UTI    • Right knee pain     \"BONE ON BONE\"   • Sjogren's disease (HCC)    • Spinal headache     WITH         Past Surgical History:   Procedure Laterality Date   • BREAST FIBROADENOMA SURGERY Left     4 cysts removed-all benign   • BREAST SURGERY     •  SECTION      x3   • CHOLECYSTECTOMY      LAP   • COLONOSCOPY  2018   • CYSTOSCOPY N/A 2016    Procedure: CYSTOSCOPY;  Surgeon: Mikey Aguirre MD;  Location: [facility];  Service:    • HYSTERECTOMY      Dr. Foster-menorrhagia   • KIDNEY STONE SURGERY     • TOTAL KNEE ARTHROPLASTY Right 2021    Procedure: TOTAL KNEE ARTHROPLASTY AND ALL ASSOCIATED PROCEDURES;  Surgeon: John Chan MD;  Location: [facility];  Service: Orthopedics;  Laterality: Right;   • URETEROSCOPY LASER LITHOTRIPSY WITH STENT INSERTION Left 2016    Procedure: lt URETEROSCOPY LASER LITHOTRIPSY WITH stone basket extraction w/STENT INSERTION;  Surgeon: Hasmukh Krueger MD;  " Location: [facility] OR;  Service:    • URETEROSCOPY LASER LITHOTRIPSY WITH STENT INSERTION Left 3/1/2019    Procedure: LEFT URETEROSCOPY LASER LITHOTRIPSY STONE BASKET EXTRACTION STENT;  Surgeon: Hasmukh Krueger MD;  Location: [facility] OR;  Service: Urology   • URETEROSCOPY LASER LITHOTRIPSY WITH STENT INSERTION Left 3/15/2019    Procedure: LEFT URETEROSCOPY LASER LITHOTRIPSY WITH STENT INSERTION STONE BASKET EXTRACTION;  Surgeon: Hasmukh Krueger MD;  Location: [facility] OR;  Service: Urology   • WISDOM TOOTH EXTRACTION                          PT Assessment/Plan     Row Name 12/23/21 1000          PT Assessment    Assessment Comments Pt with improved extension ROM post stretch. Pt tolerated progression of strengthening well.  -KM            PT Plan    PT Plan Comments Will continue to see pt 2x/week to progress ROM  -KM           User Key  (r) = Recorded By, (t) = Taken By, (c) = Cosigned By    Initials Name Provider Type    Jasmyne Carrera, LUIS Physical Therapy Assistant                   OP Exercises     Row Name 12/23/21 1000             Subjective Comments    Subjective Comments Pt states her f/u appointment went well. Was prescribed an steroid her swelling and to continue with therapy.  -KM              Exercise 1    Exercise Name 1 Heel Slides  -KM      Time 1 8 min  -KM              Exercise 2    Exercise Name 2 Wall Slides  -KM      Time 2 8 min  -KM              Exercise 3    Exercise Name 3 Bike  -KM              Exercise 4    Exercise Name 4 Manual Flexion  -KM      Reps 4 10  -KM      Time 4 10 sec hold each  -KM              Exercise 5    Exercise Name 5 HS Stretch  -KM      Reps 5 10  -KM      Time 5 10 sec hold each  -KM              Exercise 6    Exercise Name 6 Heel Prop  -KM      Time 6 5 min  -KM              Exercise 7    Exercise Name 7 Manual Extension  -KM      Reps 7 10  -KM      Time 7 10 sec hold each  -KM              Exercise 8    Exercise Name 8 QS  -KM      Reps 8 25   "-KM      Time 8 5 sec hold each  -KM              Exercise 9    Exercise Name 9 SAQ  -KM              Exercise 10    Exercise Name 10 3-Way Hip  -KM      Reps 10 25 each  -KM      Time 10 1#  -KM              Exercise 11    Exercise Name 11 LAQ  -KM      Reps 11 25  -KM      Time 11 1#  -KM              Exercise 12    Exercise Name 12 TKE  -KM      Reps 12 25  -KM      Time 12 Gold  -KM              Exercise 13    Exercise Name 13 Heel Raises  -KM      Reps 13 25  -KM              Exercise 14    Exercise Name 14 Mini Squats  -KM      Reps 14 25  -KM              Exercise 15    Exercise Name 15 6\" Fwd Step Ups  -KM      Reps 15 25  -KM            User Key  (r) = Recorded By, (t) = Taken By, (c) = Cosigned By    Initials Name Provider Type    Jasmyne Carrera PTA Physical Therapy Assistant                                                Time Calculation:   Start Time: 1000  Stop Time: 1110  Time Calculation (min): 70 min  Therapy Charges for Today     Code Description Service Date Service Provider Modifiers Qty    96095012144 HC PT MANUAL THERAPY EA 15 MIN 12/23/2021 Jasmyne Aguila PTA GP 1    97901117215 HC PT THER PROC EA 15 MIN 12/23/2021 Jasmyne Aguila PTA GP 1                    Jasmyne Aguila PTA  12/23/2021     "

## 2021-12-27 ENCOUNTER — APPOINTMENT (OUTPATIENT)
Dept: PHYSICAL THERAPY | Facility: HOSPITAL | Age: 61
End: 2021-12-27

## 2021-12-29 ENCOUNTER — APPOINTMENT (OUTPATIENT)
Dept: LAB | Facility: HOSPITAL | Age: 61
End: 2021-12-29

## 2021-12-29 ENCOUNTER — HOSPITAL ENCOUNTER (OUTPATIENT)
Dept: PHYSICAL THERAPY | Facility: HOSPITAL | Age: 61
Setting detail: THERAPIES SERIES
Discharge: HOME | End: 2021-12-29

## 2021-12-29 DIAGNOSIS — Z96.651 STATUS POST TOTAL KNEE REPLACEMENT, RIGHT: Primary | ICD-10-CM

## 2021-12-29 PROCEDURE — 97110 THERAPEUTIC EXERCISES: CPT

## 2021-12-29 PROCEDURE — 97140 MANUAL THERAPY 1/> REGIONS: CPT

## 2021-12-29 NOTE — THERAPY TREATMENT NOTE
"    Outpatient Physical Therapy Ortho Treatment Note   Sandra Wiseman     Patient Name: Jennifer Arndt  : 1960  MRN: [id number]  Today's Date: 2021      Visit Date: 2021    Visit Dx:    ICD-10-CM ICD-9-CM   1. Status post total knee replacement, right  Z96.651 V43.65       Patient Active Problem List   Diagnosis   • Acute pyelonephritis   • Left ureteral stone   • Abnormal serum total protein level   • Primary osteoarthritis of right knee   • Pain   • S/P TKR (total knee replacement), right, DOS         Past Medical History:   Diagnosis Date   • Anesthesia     PT STATES SENSITIVE TO AND B/P DROPS   • Arthritis     GENERALIZED   • Bruises easily    • CKD (chronic kidney disease)     Stage 3   • Depression    • Emphysema lung (HCC)     WAS TOLD SHE HAD THIS PER CT SCAN MAY 2018.... NO INHALERS   • Fibroids    • GERD (gastroesophageal reflux disease)    • H/O menorrhagia    • Hiatal hernia    • Hyperlipidemia    • Kidney calculi    • Kidney stone    • Recurrent UTI    • Right knee pain     \"BONE ON BONE\"   • Sjogren's disease (HCC)    • Spinal headache     WITH         Past Surgical History:   Procedure Laterality Date   • BREAST FIBROADENOMA SURGERY Left     4 cysts removed-all benign   • BREAST SURGERY     •  SECTION      x3   • CHOLECYSTECTOMY      LAP   • COLONOSCOPY  2018   • CYSTOSCOPY N/A 2016    Procedure: CYSTOSCOPY;  Surgeon: Mikey Aguirre MD;  Location: [facility];  Service:    • HYSTERECTOMY      Dr. Foster-menorrhagia   • KIDNEY STONE SURGERY     • TOTAL KNEE ARTHROPLASTY Right 2021    Procedure: TOTAL KNEE ARTHROPLASTY AND ALL ASSOCIATED PROCEDURES;  Surgeon: John Chan MD;  Location: [facility];  Service: Orthopedics;  Laterality: Right;   • URETEROSCOPY LASER LITHOTRIPSY WITH STENT INSERTION Left 2016    Procedure: lt URETEROSCOPY LASER LITHOTRIPSY WITH stone basket extraction w/STENT INSERTION;  Surgeon: Hasmukh Krueger MD;  " Location: [facility] OR;  Service:    • URETEROSCOPY LASER LITHOTRIPSY WITH STENT INSERTION Left 3/1/2019    Procedure: LEFT URETEROSCOPY LASER LITHOTRIPSY STONE BASKET EXTRACTION STENT;  Surgeon: Hasmukh Krueger MD;  Location: [facility] OR;  Service: Urology   • URETEROSCOPY LASER LITHOTRIPSY WITH STENT INSERTION Left 3/15/2019    Procedure: LEFT URETEROSCOPY LASER LITHOTRIPSY WITH STENT INSERTION STONE BASKET EXTRACTION;  Surgeon: Hasmukh Krueger MD;  Location: [facility] OR;  Service: Urology   • WISDOM TOOTH EXTRACTION                          PT Assessment/Plan     Row Name 12/29/21 1035          PT Assessment    Assessment Comments Pt ambluates with slight antalgic gait with use of SPC. Incoporated prone leg hang to progress extension.  -KM            PT Plan    PT Plan Comments Continue per POC  -KM           User Key  (r) = Recorded By, (t) = Taken By, (c) = Cosigned By    Initials Name Provider Type    Jasmyne Carrera PTA Physical Therapy Assistant                   OP Exercises     Row Name 12/29/21 1035             Subjective Comments    Subjective Comments Pt states  her knee is stiff, but doing better overall.  -KM              Exercise 1    Exercise Name 1 Heel Slides  -KM      Time 1 8 min  -KM              Exercise 2    Exercise Name 2 Wall Slides  -KM      Time 2 8 min  -KM              Exercise 3    Exercise Name 3 Bike  -KM              Exercise 4    Exercise Name 4 Manual Flexion  -KM      Reps 4 10  -KM      Time 4 10 sec hold each  -KM              Exercise 5    Exercise Name 5 HS Stretch  -KM      Reps 5 10  -KM      Time 5 10 sec hold each  -KM              Exercise 6    Exercise Name 6 Heel Prop  -KM      Time 6 5 min  -KM              Exercise 7    Exercise Name 7 Manual Extension  -KM      Reps 7 10  -KM      Time 7 10 sec hold each  -KM              Exercise 8    Exercise Name 8 QS  -KM      Reps 8 25  -KM      Time 8 5 sec hold each  -KM              Exercise 9    Exercise  "Name 9 SAQ  -KM              Exercise 10    Exercise Name 10 3-Way Hip  -KM      Reps 10 25 each  -KM      Time 10 1#  -KM              Exercise 11    Exercise Name 11 LAQ  -KM      Reps 11 25  -KM      Time 11 1#  -KM              Exercise 12    Exercise Name 12 TKE  -KM      Reps 12 25  -KM      Time 12 Gold  -KM              Exercise 13    Exercise Name 13 Heel Raises  -KM      Reps 13 25  -KM              Exercise 14    Exercise Name 14 Mini Squats  -KM      Reps 14 25  -KM              Exercise 15    Exercise Name 15 6\" Fwd Step Ups  -KM      Reps 15 25  -KM            User Key  (r) = Recorded By, (t) = Taken By, (c) = Cosigned By    Initials Name Provider Type    Jasmyne Carrera PTA Physical Therapy Assistant                                                Time Calculation:   Start Time: 1035  Stop Time: 1140  Time Calculation (min): 65 min  Therapy Charges for Today     Code Description Service Date Service Provider Modifiers Qty    74758724453 HC PT MANUAL THERAPY EA 15 MIN 12/29/2021 Jasmyne Aguila PTA GP 1    13178504329 HC PT THER PROC EA 15 MIN 12/29/2021 Jasmyne Aguila PTA GP 1                    Jasmyne Aguila PTA  12/29/2021     "

## 2021-12-31 ENCOUNTER — HOSPITAL ENCOUNTER (OUTPATIENT)
Dept: PHYSICAL THERAPY | Facility: HOSPITAL | Age: 61
Setting detail: THERAPIES SERIES
Discharge: HOME | End: 2021-12-31

## 2021-12-31 DIAGNOSIS — Z96.651 STATUS POST TOTAL KNEE REPLACEMENT, RIGHT: Primary | ICD-10-CM

## 2021-12-31 PROCEDURE — 97110 THERAPEUTIC EXERCISES: CPT

## 2021-12-31 PROCEDURE — 97140 MANUAL THERAPY 1/> REGIONS: CPT

## 2022-01-04 ENCOUNTER — HOSPITAL ENCOUNTER (OUTPATIENT)
Dept: PHYSICAL THERAPY | Facility: HOSPITAL | Age: 62
Setting detail: THERAPIES SERIES
Discharge: HOME | End: 2022-01-04

## 2022-01-04 DIAGNOSIS — Z96.651 STATUS POST TOTAL KNEE REPLACEMENT, RIGHT: Primary | ICD-10-CM

## 2022-01-04 PROCEDURE — 97110 THERAPEUTIC EXERCISES: CPT

## 2022-01-04 PROCEDURE — 97140 MANUAL THERAPY 1/> REGIONS: CPT

## 2022-01-04 NOTE — THERAPY TREATMENT NOTE
"    Outpatient Physical Therapy Ortho Treatment Note   Sandra Wiseman     Patient Name: Jennifer Arndt  : 1960  MRN: [id number]  Today's Date: 2022      Visit Date: 2022    Visit Dx:    ICD-10-CM ICD-9-CM   1. Status post total knee replacement, right  Z96.651 V43.65       Patient Active Problem List   Diagnosis   • Acute pyelonephritis   • Left ureteral stone   • Abnormal serum total protein level   • Primary osteoarthritis of right knee   • Pain   • S/P TKR (total knee replacement), right, DOS         Past Medical History:   Diagnosis Date   • Anesthesia     PT STATES SENSITIVE TO AND B/P DROPS   • Arthritis     GENERALIZED   • Bruises easily    • CKD (chronic kidney disease)     Stage 3   • Depression    • Emphysema lung (HCC)     WAS TOLD SHE HAD THIS PER CT SCAN MAY 2018.... NO INHALERS   • Fibroids    • GERD (gastroesophageal reflux disease)    • H/O menorrhagia    • Hiatal hernia    • Hyperlipidemia    • Kidney calculi    • Kidney stone    • Recurrent UTI    • Right knee pain     \"BONE ON BONE\"   • Sjogren's disease (HCC)    • Spinal headache     WITH         Past Surgical History:   Procedure Laterality Date   • BREAST FIBROADENOMA SURGERY Left     4 cysts removed-all benign   • BREAST SURGERY     •  SECTION      x3   • CHOLECYSTECTOMY      LAP   • COLONOSCOPY  2018   • CYSTOSCOPY N/A 2016    Procedure: CYSTOSCOPY;  Surgeon: Mikey Aguirre MD;  Location: [facility];  Service:    • HYSTERECTOMY      Dr. Foster-menorrhagia   • KIDNEY STONE SURGERY     • TOTAL KNEE ARTHROPLASTY Right 2021    Procedure: TOTAL KNEE ARTHROPLASTY AND ALL ASSOCIATED PROCEDURES;  Surgeon: John Chan MD;  Location: [facility];  Service: Orthopedics;  Laterality: Right;   • URETEROSCOPY LASER LITHOTRIPSY WITH STENT INSERTION Left 2016    Procedure: lt URETEROSCOPY LASER LITHOTRIPSY WITH stone basket extraction w/STENT INSERTION;  Surgeon: Hasmukh Krueger MD;  " Location: [facility] OR;  Service:    • URETEROSCOPY LASER LITHOTRIPSY WITH STENT INSERTION Left 3/1/2019    Procedure: LEFT URETEROSCOPY LASER LITHOTRIPSY STONE BASKET EXTRACTION STENT;  Surgeon: Hasmukh Krueger MD;  Location: [facility] OR;  Service: Urology   • URETEROSCOPY LASER LITHOTRIPSY WITH STENT INSERTION Left 3/15/2019    Procedure: LEFT URETEROSCOPY LASER LITHOTRIPSY WITH STENT INSERTION STONE BASKET EXTRACTION;  Surgeon: Hasmukh Krueger MD;  Location: [facility] OR;  Service: Urology   • WISDOM TOOTH EXTRACTION          PT Ortho     Row Name 01/04/22 1040       Right Lower Ext    Rt Knee Extension/Flexion AROM 0-3-121 post stretch  0-5-112 pre stretch  -KM          User Key  (r) = Recorded By, (t) = Taken By, (c) = Cosigned By    Initials Name Provider Type    Jasmyne Carrera PTA Physical Therapy Assistant                             PT Assessment/Plan     Row Name 01/04/22 1040          PT Assessment    Assessment Comments Pt ambulates with slight antalgic gait demonstrating decreased heel strike and knee extension. Pt limited in active knee extension but improves post stretch.  -KM            PT Plan    PT Plan Comments Continue per POC  -KM           User Key  (r) = Recorded By, (t) = Taken By, (c) = Cosigned By    Initials Name Provider Type    Jasmyne Carrera PTA Physical Therapy Assistant                   OP Exercises     Row Name 01/04/22 1040             Subjective Comments    Subjective Comments Pt states her knee was really swollen on Sunday after cleaning the house.  -KM              Exercise 1    Exercise Name 1 Heel Slides  -KM      Time 1 8 min  -KM              Exercise 2    Exercise Name 2 Wall Slides  -KM      Time 2 8 min  -KM              Exercise 3    Exercise Name 3 Bike  -KM              Exercise 4    Exercise Name 4 Manual Flexion  -KM      Reps 4 10  -KM      Time 4 10 sec hold each  -KM              Exercise 5    Exercise Name 5 HS Stretch  -KM      Reps 5 10   "-KM      Time 5 10 sec hold each  -KM              Exercise 6    Exercise Name 6 Heel Prop  -KM      Time 6 5 min- 1#  -KM      Additional Comments PLH x 5 min  -KM              Exercise 7    Exercise Name 7 Manual Extension  -KM      Reps 7 10  -KM      Time 7 10 sec hold each  -KM              Exercise 8    Exercise Name 8 QS (ankle)  -KM      Reps 8 25  -KM      Time 8 5 sec hold each  -KM              Exercise 9    Exercise Name 9 SAQ  -KM              Exercise 10    Exercise Name 10 3-Way Hip  -KM      Reps 10 25 each  -KM      Time 10 1#  -KM              Exercise 11    Exercise Name 11 LAQ  -KM      Reps 11 25  -KM      Time 11 1#  -KM              Exercise 12    Exercise Name 12 TKE  -KM      Reps 12 25  -KM      Time 12 Gold  -KM              Exercise 13    Exercise Name 13 Heel Raises  -KM      Reps 13 25  -KM              Exercise 14    Exercise Name 14 Mini Squats  -KM      Reps 14 25  -KM              Exercise 15    Exercise Name 15 6\" Fwd Step Ups  -KM      Reps 15 25  -KM            User Key  (r) = Recorded By, (t) = Taken By, (c) = Cosigned By    Initials Name Provider Type    Jasmyne Carrera PTA Physical Therapy Assistant                                                Time Calculation:   Start Time: 1040  Stop Time: 1149  Time Calculation (min): 69 min  Therapy Charges for Today     Code Description Service Date Service Provider Modifiers Qty    29807015436 HC PT MANUAL THERAPY EA 15 MIN 1/4/2022 Jasmyne Aguila PTA GP 1    38567465577 HC PT THER PROC EA 15 MIN 1/4/2022 Jasmyne Aguila PTA GP 1                    Jasmyne Aguila PTA  1/4/2022     "

## 2022-01-06 ENCOUNTER — APPOINTMENT (OUTPATIENT)
Dept: PHYSICAL THERAPY | Facility: HOSPITAL | Age: 62
End: 2022-01-06

## 2022-01-11 ENCOUNTER — HOSPITAL ENCOUNTER (OUTPATIENT)
Dept: PHYSICAL THERAPY | Facility: HOSPITAL | Age: 62
Setting detail: THERAPIES SERIES
Discharge: HOME | End: 2022-01-11

## 2022-01-11 DIAGNOSIS — Z96.651 STATUS POST TOTAL KNEE REPLACEMENT, RIGHT: Primary | ICD-10-CM

## 2022-01-11 PROCEDURE — 97140 MANUAL THERAPY 1/> REGIONS: CPT

## 2022-01-11 PROCEDURE — 97110 THERAPEUTIC EXERCISES: CPT

## 2022-01-11 NOTE — THERAPY TREATMENT NOTE
"    Outpatient Physical Therapy Ortho Treatment Note   Sandra Wiseman     Patient Name: Jennifer Arndt  : 1960  MRN: [id number]  Today's Date: 2022      Visit Date: 2022    Visit Dx:    ICD-10-CM ICD-9-CM   1. Status post total knee replacement, right  Z96.651 V43.65       Patient Active Problem List   Diagnosis   • Acute pyelonephritis   • Left ureteral stone   • Abnormal serum total protein level   • Primary osteoarthritis of right knee   • Pain   • S/P TKR (total knee replacement), right, DOS         Past Medical History:   Diagnosis Date   • Anesthesia     PT STATES SENSITIVE TO AND B/P DROPS   • Arthritis     GENERALIZED   • Bruises easily    • CKD (chronic kidney disease)     Stage 3   • Depression    • Emphysema lung (HCC)     WAS TOLD SHE HAD THIS PER CT SCAN MAY 2018.... NO INHALERS   • Fibroids    • GERD (gastroesophageal reflux disease)    • H/O menorrhagia    • Hiatal hernia    • Hyperlipidemia    • Kidney calculi    • Kidney stone    • Recurrent UTI    • Right knee pain     \"BONE ON BONE\"   • Sjogren's disease (HCC)    • Spinal headache     WITH         Past Surgical History:   Procedure Laterality Date   • BREAST FIBROADENOMA SURGERY Left     4 cysts removed-all benign   • BREAST SURGERY     •  SECTION      x3   • CHOLECYSTECTOMY      LAP   • COLONOSCOPY  2018   • CYSTOSCOPY N/A 2016    Procedure: CYSTOSCOPY;  Surgeon: Mikey Aguirre MD;  Location: [facility];  Service:    • HYSTERECTOMY      Dr. Foster-menorrhagia   • KIDNEY STONE SURGERY     • TOTAL KNEE ARTHROPLASTY Right 2021    Procedure: TOTAL KNEE ARTHROPLASTY AND ALL ASSOCIATED PROCEDURES;  Surgeon: John Chan MD;  Location: [facility];  Service: Orthopedics;  Laterality: Right;   • URETEROSCOPY LASER LITHOTRIPSY WITH STENT INSERTION Left 2016    Procedure: lt URETEROSCOPY LASER LITHOTRIPSY WITH stone basket extraction w/STENT INSERTION;  Surgeon: Hasmukh Krueger MD;  " Location: [facility] OR;  Service:    • URETEROSCOPY LASER LITHOTRIPSY WITH STENT INSERTION Left 3/1/2019    Procedure: LEFT URETEROSCOPY LASER LITHOTRIPSY STONE BASKET EXTRACTION STENT;  Surgeon: Hasmukh Krueger MD;  Location: [facility] OR;  Service: Urology   • URETEROSCOPY LASER LITHOTRIPSY WITH STENT INSERTION Left 3/15/2019    Procedure: LEFT URETEROSCOPY LASER LITHOTRIPSY WITH STENT INSERTION STONE BASKET EXTRACTION;  Surgeon: Hasmukh Krueger MD;  Location: [facility] OR;  Service: Urology   • WISDOM TOOTH EXTRACTION                          PT Assessment/Plan     Row Name 01/11/22 1010          PT Assessment    Assessment Comments Pt still limited with extension ROM but does improve post stretch. Progressing well with strength and function  -KM            PT Plan    PT Plan Comments Continue per POC  -KM           User Key  (r) = Recorded By, (t) = Taken By, (c) = Cosigned By    Initials Name Provider Type    Jasmyne Carrera PTA Physical Therapy Assistant                   OP Exercises     Row Name 01/11/22 1010             Subjective Comments    Subjective Comments Pt states her knee is doing well.  -KM              Exercise 1    Exercise Name 1 Heel Slides  -KM      Time 1 8 min  -KM              Exercise 2    Exercise Name 2 Wall Slides  -KM      Time 2 8 min  -KM              Exercise 3    Exercise Name 3 Bike  -KM              Exercise 4    Exercise Name 4 Manual Flexion  -KM      Reps 4 10  -KM      Time 4 10 sec hold each  -KM              Exercise 5    Exercise Name 5 HS Stretch  -KM      Reps 5 10  -KM      Time 5 10 sec hold each  -KM              Exercise 6    Exercise Name 6 Heel Prop  -KM      Time 6 5 min- 2#  -KM      Additional Comments PLH x 5 min 2#  -KM              Exercise 7    Exercise Name 7 Manual Extension  -KM      Reps 7 10  -KM      Time 7 10 sec hold each  -KM              Exercise 8    Exercise Name 8 QS (ankle)  -KM      Reps 8 25  -KM      Time 8 5 sec hold each   "-KM              Exercise 9    Exercise Name 9 SAQ  -KM              Exercise 10    Exercise Name 10 3-Way Hip  -KM      Reps 10 25 each  -KM      Time 10 2#  -KM              Exercise 11    Exercise Name 11 LAQ  -KM      Reps 11 25  -KM      Time 11 2#  -KM              Exercise 12    Exercise Name 12 TKE  -KM      Reps 12 25  -KM      Time 12 Gold  -KM              Exercise 13    Exercise Name 13 Heel Raises  -KM      Reps 13 25  -KM              Exercise 14    Exercise Name 14 Mini Squats  -KM      Reps 14 25  -KM              Exercise 15    Exercise Name 15 6\" Fwd Step Ups  -KM      Reps 15 25  -KM              Exercise 16    Exercise Name 16 6\" Lat Step Overs  -KM      Reps 16 25  -KM            User Key  (r) = Recorded By, (t) = Taken By, (c) = Cosigned By    Initials Name Provider Type    Jasmyne Carrera PTA Physical Therapy Assistant                                                Time Calculation:   Start Time: 1010  Stop Time: 1114  Time Calculation (min): 64 min  Therapy Charges for Today     Code Description Service Date Service Provider Modifiers Qty    26359614211 HC PT MANUAL THERAPY EA 15 MIN 1/11/2022 Jasmyne Aguila PTA GP 1    62039920642 HC PT THER PROC EA 15 MIN 1/11/2022 Jasmyne Aguila PTA GP 1                    Jasmyne Aguila PTA  1/11/2022     "

## 2022-01-12 ENCOUNTER — APPOINTMENT (OUTPATIENT)
Dept: LAB | Facility: HOSPITAL | Age: 62
End: 2022-01-12

## 2022-01-13 ENCOUNTER — HOSPITAL ENCOUNTER (OUTPATIENT)
Dept: PHYSICAL THERAPY | Facility: HOSPITAL | Age: 62
Setting detail: THERAPIES SERIES
Discharge: HOME | End: 2022-01-13

## 2022-01-13 DIAGNOSIS — Z96.651 STATUS POST TOTAL KNEE REPLACEMENT, RIGHT: Primary | ICD-10-CM

## 2022-01-13 PROCEDURE — 97110 THERAPEUTIC EXERCISES: CPT

## 2022-01-13 PROCEDURE — 97140 MANUAL THERAPY 1/> REGIONS: CPT

## 2022-01-13 NOTE — THERAPY TREATMENT NOTE
"    Outpatient Physical Therapy Ortho Treatment Note   Sandra Wiseman     Patient Name: Jennifer Arndt  : 1960  MRN: [id number]  Today's Date: 2022      Visit Date: 2022    Visit Dx:    ICD-10-CM ICD-9-CM   1. Status post total knee replacement, right  Z96.651 V43.65       Patient Active Problem List   Diagnosis   • Acute pyelonephritis   • Left ureteral stone   • Abnormal serum total protein level   • Primary osteoarthritis of right knee   • Pain   • S/P TKR (total knee replacement), right, DOS         Past Medical History:   Diagnosis Date   • Anesthesia     PT STATES SENSITIVE TO AND B/P DROPS   • Arthritis     GENERALIZED   • Bruises easily    • CKD (chronic kidney disease)     Stage 3   • Depression    • Emphysema lung (HCC)     WAS TOLD SHE HAD THIS PER CT SCAN MAY 2018.... NO INHALERS   • Fibroids    • GERD (gastroesophageal reflux disease)    • H/O menorrhagia    • Hiatal hernia    • Hyperlipidemia    • Kidney calculi    • Kidney stone    • Recurrent UTI    • Right knee pain     \"BONE ON BONE\"   • Sjogren's disease (HCC)    • Spinal headache     WITH         Past Surgical History:   Procedure Laterality Date   • BREAST FIBROADENOMA SURGERY Left     4 cysts removed-all benign   • BREAST SURGERY     •  SECTION      x3   • CHOLECYSTECTOMY      LAP   • COLONOSCOPY  2018   • CYSTOSCOPY N/A 2016    Procedure: CYSTOSCOPY;  Surgeon: Mikey Aguirre MD;  Location: [facility];  Service:    • HYSTERECTOMY      Dr. Foster-menorrhagia   • KIDNEY STONE SURGERY     • TOTAL KNEE ARTHROPLASTY Right 2021    Procedure: TOTAL KNEE ARTHROPLASTY AND ALL ASSOCIATED PROCEDURES;  Surgeon: John Chan MD;  Location: [facility];  Service: Orthopedics;  Laterality: Right;   • URETEROSCOPY LASER LITHOTRIPSY WITH STENT INSERTION Left 2016    Procedure: lt URETEROSCOPY LASER LITHOTRIPSY WITH stone basket extraction w/STENT INSERTION;  Surgeon: Hasmukh Krueger MD;  " Location: [facility] OR;  Service:    • URETEROSCOPY LASER LITHOTRIPSY WITH STENT INSERTION Left 3/1/2019    Procedure: LEFT URETEROSCOPY LASER LITHOTRIPSY STONE BASKET EXTRACTION STENT;  Surgeon: Hasmukh Krueger MD;  Location: [facility] OR;  Service: Urology   • URETEROSCOPY LASER LITHOTRIPSY WITH STENT INSERTION Left 3/15/2019    Procedure: LEFT URETEROSCOPY LASER LITHOTRIPSY WITH STENT INSERTION STONE BASKET EXTRACTION;  Surgeon: Hasmkuh Krueger MD;  Location: [facility] OR;  Service: Urology   • WISDOM TOOTH EXTRACTION                          PT Assessment/Plan     Row Name 01/13/22 0945          PT Assessment    Assessment Comments Despite symptoms reports, pt tolerated treatment session well. Continue to  push extension ROM  -KM            PT Plan    PT Plan Comments Continue per POC  -KM           User Key  (r) = Recorded By, (t) = Taken By, (c) = Cosigned By    Initials Name Provider Type    Jasmyne Carrera, PTA Physical Therapy Assistant                   OP Exercises     Row Name 01/13/22 0945             Subjective Comments    Subjective Comments Pt states she walk around [employer] x 30 minutes without and AD causing her knee to be really sore today.  -KM              Exercise 1    Exercise Name 1 Heel Slides  -KM      Time 1 8 min  -KM              Exercise 2    Exercise Name 2 Wall Slides  -KM      Time 2 8 min  -KM              Exercise 3    Exercise Name 3 Bike  -KM              Exercise 4    Exercise Name 4 Manual Flexion  -KM      Reps 4 10  -KM      Time 4 10 sec hold each  -KM              Exercise 5    Exercise Name 5 HS Stretch  -KM      Reps 5 10  -KM      Time 5 10 sec hold each  -KM              Exercise 6    Exercise Name 6 Heel Prop  -KM      Time 6 5 min- 2#  -KM      Additional Comments PLH x 5 min 2#  -KM              Exercise 7    Exercise Name 7 Manual Extension  -KM      Reps 7 10  -KM      Time 7 10 sec hold each  -KM              Exercise 8    Exercise Name 8 QS (ankle)   "-KM      Reps 8 25  -KM      Time 8 5 sec hold each  -KM              Exercise 9    Exercise Name 9 SAQ  -KM              Exercise 10    Exercise Name 10 3-Way Hip  -KM      Reps 10 25 each  -KM      Time 10 2#  -KM              Exercise 11    Exercise Name 11 LAQ  -KM      Reps 11 25  -KM      Time 11 2#  -KM              Exercise 12    Exercise Name 12 TKE  -KM      Reps 12 25  -KM      Time 12 Gold  -KM              Exercise 13    Exercise Name 13 Heel Raises  -KM      Reps 13 25  -KM              Exercise 14    Exercise Name 14 Mini Squats  -KM      Reps 14 25  -KM              Exercise 15    Exercise Name 15 6\" Fwd Step Ups  -KM      Reps 15 25  -KM              Exercise 16    Exercise Name 16 6\" Lat Step Overs  -KM      Reps 16 25  -KM            User Key  (r) = Recorded By, (t) = Taken By, (c) = Cosigned By    Initials Name Provider Type    Jasmyne Carrera PTA Physical Therapy Assistant                                                Time Calculation:   Start Time: 0945  Stop Time: 1042  Time Calculation (min): 57 min  Therapy Charges for Today     Code Description Service Date Service Provider Modifiers Qty    51689900600 HC PT MANUAL THERAPY EA 15 MIN 1/13/2022 aJsmyne Aguila PTA GP 1    69574099415 HC PT THER PROC EA 15 MIN 1/13/2022 Jasmyne Aguila PTA GP 1                    Jasmyne Aguila PTA  1/13/2022     "

## 2022-01-18 ENCOUNTER — HOSPITAL ENCOUNTER (OUTPATIENT)
Dept: PHYSICAL THERAPY | Facility: HOSPITAL | Age: 62
Setting detail: THERAPIES SERIES
Discharge: HOME | End: 2022-01-18

## 2022-01-18 DIAGNOSIS — Z96.651 STATUS POST TOTAL KNEE REPLACEMENT, RIGHT: Primary | ICD-10-CM

## 2022-01-18 PROCEDURE — 97110 THERAPEUTIC EXERCISES: CPT

## 2022-01-18 PROCEDURE — 97140 MANUAL THERAPY 1/> REGIONS: CPT

## 2022-01-18 NOTE — THERAPY TREATMENT NOTE
"    Outpatient Physical Therapy Ortho Treatment Note   Sandra Wiseman     Patient Name: Jennifer Arndt  : 1960  MRN: [id number]  Today's Date: 2022      Visit Date: 2022    Visit Dx:    ICD-10-CM ICD-9-CM   1. Status post total knee replacement, right  Z96.651 V43.65       Patient Active Problem List   Diagnosis   • Acute pyelonephritis   • Left ureteral stone   • Abnormal serum total protein level   • Primary osteoarthritis of right knee   • Pain   • S/P TKR (total knee replacement), right, DOS         Past Medical History:   Diagnosis Date   • Anesthesia     PT STATES SENSITIVE TO AND B/P DROPS   • Arthritis     GENERALIZED   • Bruises easily    • CKD (chronic kidney disease)     Stage 3   • Depression    • Emphysema lung (HCC)     WAS TOLD SHE HAD THIS PER CT SCAN MAY 2018.... NO INHALERS   • Fibroids    • GERD (gastroesophageal reflux disease)    • H/O menorrhagia    • Hiatal hernia    • Hyperlipidemia    • Kidney calculi    • Kidney stone    • Recurrent UTI    • Right knee pain     \"BONE ON BONE\"   • Sjogren's disease (HCC)    • Spinal headache     WITH         Past Surgical History:   Procedure Laterality Date   • BREAST FIBROADENOMA SURGERY Left     4 cysts removed-all benign   • BREAST SURGERY     •  SECTION      x3   • CHOLECYSTECTOMY      LAP   • COLONOSCOPY  2018   • CYSTOSCOPY N/A 2016    Procedure: CYSTOSCOPY;  Surgeon: Mikey Aguirre MD;  Location: [facility];  Service:    • HYSTERECTOMY      Dr. Foster-menorrhagia   • KIDNEY STONE SURGERY     • TOTAL KNEE ARTHROPLASTY Right 2021    Procedure: TOTAL KNEE ARTHROPLASTY AND ALL ASSOCIATED PROCEDURES;  Surgeon: John Chan MD;  Location: [facility];  Service: Orthopedics;  Laterality: Right;   • URETEROSCOPY LASER LITHOTRIPSY WITH STENT INSERTION Left 2016    Procedure: lt URETEROSCOPY LASER LITHOTRIPSY WITH stone basket extraction w/STENT INSERTION;  Surgeon: Hasmukh Krueger MD;  " Location: [facility] OR;  Service:    • URETEROSCOPY LASER LITHOTRIPSY WITH STENT INSERTION Left 3/1/2019    Procedure: LEFT URETEROSCOPY LASER LITHOTRIPSY STONE BASKET EXTRACTION STENT;  Surgeon: Hasmukh Krueger MD;  Location: [facility] OR;  Service: Urology   • URETEROSCOPY LASER LITHOTRIPSY WITH STENT INSERTION Left 3/15/2019    Procedure: LEFT URETEROSCOPY LASER LITHOTRIPSY WITH STENT INSERTION STONE BASKET EXTRACTION;  Surgeon: Hasmukh Krueger MD;  Location: [facility] OR;  Service: Urology   • WISDOM TOOTH EXTRACTION          PT Ortho     Row Name 01/18/22 0950       Right Lower Ext    Rt Knee Extension/Flexion AROM 0-2-123 post stretch  -KM          User Key  (r) = Recorded By, (t) = Taken By, (c) = Cosigned By    Initials Name Provider Type    Jasmyne Carrera PTA Physical Therapy Assistant                             PT Assessment/Plan     Row Name 01/18/22 0935          PT Assessment    Assessment Comments Pt is making good progress toward short term goals. Pt measures improved AROM post stretch; good tolerance to prescribed exercises  -KM            PT Plan    PT Plan Comments Continue POC per MD  -KM           User Key  (r) = Recorded By, (t) = Taken By, (c) = Cosigned By    Initials Name Provider Type    Jasmyne Carrera PTA Physical Therapy Assistant                   OP Exercises     Row Name 01/18/22 0948             Subjective Comments    Subjective Comments Pt states her knee is doing well and feels she is walking better.  -KM              Exercise 1    Exercise Name 1 Heel Slides  -KM      Time 1 8 min  -KM              Exercise 2    Exercise Name 2 Wall Slides  -KM      Time 2 8 min  -KM              Exercise 3    Exercise Name 3 Bike  -KM              Exercise 4    Exercise Name 4 Manual Flexion  -KM      Reps 4 10  -KM      Time 4 10 sec hold each  -KM              Exercise 5    Exercise Name 5 HS Stretch  -KM      Reps 5 10  -KM      Time 5 10 sec hold each  -KM               "Exercise 6    Exercise Name 6 Heel Prop  -KM      Time 6 5 min- 2#  -KM              Exercise 7    Exercise Name 7 Manual Extension  -KM      Reps 7 10  -KM      Time 7 10 sec hold each  -KM              Exercise 8    Exercise Name 8 QS (ankle)  -KM      Reps 8 25  -KM      Time 8 5 sec hold each  -KM              Exercise 9    Exercise Name 9 SAQ  -KM              Exercise 10    Exercise Name 10 3-Way Hip  -KM      Reps 10 25 each  -KM      Time 10 2#  -KM              Exercise 11    Exercise Name 11 LAQ  -KM      Reps 11 25  -KM      Time 11 2#  -KM              Exercise 12    Exercise Name 12 TKE  -KM      Reps 12 25  -KM      Time 12 Gold  -KM              Exercise 13    Exercise Name 13 Heel Raises  -KM      Reps 13 25  -KM              Exercise 14    Exercise Name 14 Mini Squats  -KM      Reps 14 25  -KM              Exercise 15    Exercise Name 15 6\" Fwd Step Ups  -KM      Reps 15 25  -KM              Exercise 16    Exercise Name 16 6\" Lat Step Overs  -KM      Reps 16 25  -KM            User Key  (r) = Recorded By, (t) = Taken By, (c) = Cosigned By    Initials Name Provider Type    Jasmyne Carrera, PTA Physical Therapy Assistant                              PT OP Goals     Row Name 01/18/22 0950          PT Short Term Goals    STG Date to Achieve 12/31/21  -KM     STG 1 Patient to demonstrate compliance with her initial HEP for flexibility, ROM and strengthening.  -KM     STG 1 Progress Ongoing  -KM     STG 2 Patient to report right knee pain on VAS of 5-6/10 at worst with activity.  -KM     STG 2 Progress Met  -KM     STG 3 Patient to demonstrate improved right knee and RLE strength to 4/5 in all planes.  -KM     STG 4 Patient to demonstrate improved right knee ROM to 0-1-120 degrees.  -KM     STG 4 Progress Ongoing  -KM     STG 5 Patient to ambulate short distances without the use of an AD and with an improved gait pattern.  -KM     STG 5 Progress Ongoing  -KM            Long Term Goals    LTG Date to " Achieve 01/21/22  -KM     LTG 1 Patient to demonstrate compliance with her advanced HEP for flexibility, ROM and strengthening.  -KM     LTG 2 Patient to report right knee pain on VAS of 2-3/10 at worst with activity.  -KM     LTG 3 Patient to demonstrate improved right knee and RLE strength to 4+/5 in all planes.  -KM     LTG 4 Patient to demonstrate improved right knee ROM to 0-125 degrees.  -KM     LTG 5 Patient to ambulate community distances without an AD and with a normal gait pattern.  -KM     LTG 6 Patient to report overall improved function on LEFS to 55/80.  -KM           User Key  (r) = Recorded By, (t) = Taken By, (c) = Cosigned By    Initials Name Provider Type    Jasmyne Carrera PTA Physical Therapy Assistant                               Time Calculation:   Start Time: 0950  Stop Time: 1053  Time Calculation (min): 63 min  Therapy Charges for Today     Code Description Service Date Service Provider Modifiers Qty    24271013532 HC PT MANUAL THERAPY EA 15 MIN 1/18/2022 Jasmyne Aguila PTA GP 1    85037342459 HC PT THER PROC EA 15 MIN 1/18/2022 Jasmyne Aguila PTA GP 1                    Jasmyne Aguila PTA  1/18/2022

## 2022-01-19 ENCOUNTER — OFFICE VISIT (OUTPATIENT)
Dept: ORTHOPEDIC SURGERY | Facility: CLINIC | Age: 62
End: 2022-01-19

## 2022-01-19 ENCOUNTER — LAB (OUTPATIENT)
Dept: LAB | Facility: HOSPITAL | Age: 62
End: 2022-01-19

## 2022-01-19 ENCOUNTER — TRANSCRIBE ORDERS (OUTPATIENT)
Dept: ADMINISTRATIVE | Facility: HOSPITAL | Age: 62
End: 2022-01-19

## 2022-01-19 VITALS — WEIGHT: 192 LBS | BODY MASS INDEX: 29.1 KG/M2 | HEIGHT: 68 IN

## 2022-01-19 DIAGNOSIS — N18.32 CHRONIC KIDNEY DISEASE (CKD) STAGE G3B/A1, MODERATELY DECREASED GLOMERULAR FILTRATION RATE (GFR) BETWEEN 30-44 ML/MIN/1.73 SQUARE METER AND ALBUMINURIA CREATININE RATIO LESS THAN 30 MG/G (CMS/H*: Primary | ICD-10-CM

## 2022-01-19 DIAGNOSIS — N18.32 CHRONIC KIDNEY DISEASE (CKD) STAGE G3B/A1, MODERATELY DECREASED GLOMERULAR FILTRATION RATE (GFR) BETWEEN 30-44 ML/MIN/1.73 SQUARE METER AND ALBUMINURIA CREATININE RATIO LESS THAN 30 MG/G (CMS/H*: ICD-10-CM

## 2022-01-19 DIAGNOSIS — Z96.651 S/P TKR (TOTAL KNEE REPLACEMENT), RIGHT: Primary | ICD-10-CM

## 2022-01-19 LAB
ANION GAP SERPL CALCULATED.3IONS-SCNC: 10.6 MMOL/L (ref 5–15)
BACTERIA UR QL AUTO: ABNORMAL /HPF
BASOPHILS # BLD AUTO: 0.06 10*3/MM3 (ref 0–0.2)
BASOPHILS NFR BLD AUTO: 1 % (ref 0–1.5)
BILIRUB UR QL STRIP: NEGATIVE
BUN SERPL-MCNC: 19 MG/DL (ref 8–23)
BUN/CREAT SERPL: 15.7 (ref 7–25)
CALCIUM SPEC-SCNC: 9.7 MG/DL (ref 8.6–10.5)
CHLORIDE SERPL-SCNC: 108 MMOL/L (ref 98–107)
CLARITY UR: ABNORMAL
CO2 SERPL-SCNC: 18.4 MMOL/L (ref 22–29)
COLOR UR: YELLOW
CREAT SERPL-MCNC: 1.21 MG/DL (ref 0.57–1)
DEPRECATED RDW RBC AUTO: 46.4 FL (ref 37–54)
EOSINOPHIL # BLD AUTO: 0.18 10*3/MM3 (ref 0–0.4)
EOSINOPHIL NFR BLD AUTO: 2.9 % (ref 0.3–6.2)
ERYTHROCYTE [DISTWIDTH] IN BLOOD BY AUTOMATED COUNT: 13.3 % (ref 12.3–15.4)
GFR SERPL CREATININE-BSD FRML MDRD: 45 ML/MIN/1.73
GLUCOSE SERPL-MCNC: 92 MG/DL (ref 65–99)
GLUCOSE UR STRIP-MCNC: NEGATIVE MG/DL
HCT VFR BLD AUTO: 40.9 % (ref 34–46.6)
HGB BLD-MCNC: 13.2 G/DL (ref 12–15.9)
HGB UR QL STRIP.AUTO: ABNORMAL
HYALINE CASTS UR QL AUTO: ABNORMAL /LPF
IMM GRANULOCYTES # BLD AUTO: 0.01 10*3/MM3 (ref 0–0.05)
IMM GRANULOCYTES NFR BLD AUTO: 0.2 % (ref 0–0.5)
KETONES UR QL STRIP: NEGATIVE
LEUKOCYTE ESTERASE UR QL STRIP.AUTO: ABNORMAL
LYMPHOCYTES # BLD AUTO: 2.1 10*3/MM3 (ref 0.7–3.1)
LYMPHOCYTES NFR BLD AUTO: 34.4 % (ref 19.6–45.3)
MCH RBC QN AUTO: 30.8 PG (ref 26.6–33)
MCHC RBC AUTO-ENTMCNC: 32.3 G/DL (ref 31.5–35.7)
MCV RBC AUTO: 95.3 FL (ref 79–97)
MONOCYTES # BLD AUTO: 0.71 10*3/MM3 (ref 0.1–0.9)
MONOCYTES NFR BLD AUTO: 11.6 % (ref 5–12)
NEUTROPHILS NFR BLD AUTO: 3.05 10*3/MM3 (ref 1.7–7)
NEUTROPHILS NFR BLD AUTO: 49.9 % (ref 42.7–76)
NITRITE UR QL STRIP: NEGATIVE
NRBC BLD AUTO-RTO: 0 /100 WBC (ref 0–0.2)
PH UR STRIP.AUTO: 7 [PH] (ref 5–8)
PLATELET # BLD AUTO: 348 10*3/MM3 (ref 140–450)
PMV BLD AUTO: 10.6 FL (ref 6–12)
POTASSIUM SERPL-SCNC: 4.5 MMOL/L (ref 3.5–5.2)
PROT UR QL STRIP: ABNORMAL
RBC # BLD AUTO: 4.29 10*6/MM3 (ref 3.77–5.28)
RBC # UR STRIP: ABNORMAL /HPF
REF LAB TEST METHOD: ABNORMAL
SODIUM SERPL-SCNC: 137 MMOL/L (ref 136–145)
SP GR UR STRIP: 1.02 (ref 1–1.03)
SQUAMOUS #/AREA URNS HPF: ABNORMAL /HPF
UROBILINOGEN UR QL STRIP: ABNORMAL
WBC # UR STRIP: ABNORMAL /HPF
WBC NRBC COR # BLD: 6.11 10*3/MM3 (ref 3.4–10.8)

## 2022-01-19 PROCEDURE — 85025 COMPLETE CBC W/AUTO DIFF WBC: CPT

## 2022-01-19 PROCEDURE — 87086 URINE CULTURE/COLONY COUNT: CPT

## 2022-01-19 PROCEDURE — 36415 COLL VENOUS BLD VENIPUNCTURE: CPT

## 2022-01-19 PROCEDURE — 81001 URINALYSIS AUTO W/SCOPE: CPT

## 2022-01-19 PROCEDURE — 73562 X-RAY EXAM OF KNEE 3: CPT | Performed by: ORTHOPAEDIC SURGERY

## 2022-01-19 PROCEDURE — 80048 BASIC METABOLIC PNL TOTAL CA: CPT

## 2022-01-19 PROCEDURE — 99024 POSTOP FOLLOW-UP VISIT: CPT | Performed by: ORTHOPAEDIC SURGERY

## 2022-01-19 RX ORDER — OXYCODONE HYDROCHLORIDE AND ACETAMINOPHEN 5; 325 MG/1; MG/1
1 TABLET ORAL EVERY 4 HOURS PRN
Qty: 42 TABLET | Refills: 0 | Status: SHIPPED | OUTPATIENT
Start: 2022-01-19

## 2022-01-19 RX ORDER — PREDNISONE 10 MG/1
TABLET ORAL
Qty: 39 TABLET | Refills: 0 | Status: SHIPPED | OUTPATIENT
Start: 2022-01-19 | End: 2022-02-14

## 2022-01-19 RX ORDER — CYCLOBENZAPRINE HCL 5 MG
5 TABLET ORAL 3 TIMES DAILY PRN
Qty: 45 TABLET | Refills: 0 | Status: SHIPPED | OUTPATIENT
Start: 2022-01-19

## 2022-01-19 NOTE — PROGRESS NOTES
The patient has consented to being recorded using [employer].    CC: s/p right total knee arthroplasty, DOS 12/7/2021  Interval History: Jennifer Arndt returns for 6 week postoperative visit.  She is doing well. Pain is controlled with pain medication and is  improving. She denies any wound problem, fevers, or chills. Patient is continuing to work with PT. Ambulating without cane. However, she is experiencing mild residual stiffness. She denies use of a knee sleeve for stability. Her pain is exacerbated at night. She states only getting 2 to 3 hours of sleep, at which time she will need to get up and ambulate around. She inquires a refill on her pain medication. Additionally, she reports previous use of a 5-day Medrol Dosepack, with improvement.     Physical Examination: Right knee was examined   Incision well healed   Moderate soft tissue swelling noted.   ROM 2 to 120,  4/5 strength    Flexion and Extension   Stable to varus and valgus stress   Stable opening on varus and valgus stress at 2 and 30.   Flex/extend ankle and toes   Positive sensation right foot   No calf pain, negative Homans sign bilaterally    Radiographic review: Radiographs of the right knee 3 views, AP, lateral and patella axial views, compared to immediate postop films, indication s/p TKA,  shows that the implant is in good position. There is no evidence of implant loosening or osteolysis, no dislocation or periprosthetic fractures. Overall alignment acceptable at this time.     Assessment/Plan:    1. Discussed treatment options at length with patient at today's visit.   2. I will prescribe a prednisone taper and Flexeril at night to improve with residual stiffness.  3. Continue working aggressively with physical therapy, particularly with her stretching exercises.  4. We discussed using prone hangs to improve with her mobility.   5. I encouraged the patient to use a knee sleeve for stability.   6. Follow-up in 4 weeks for  reassessment.    Medications:  New Medications Ordered This Visit   Medications   • predniSONE (DELTASONE) 10 MG tablet     Si mg po daily x 3 days, then 40 mg po daily x 3 days, then 20 mg po daily x 3 days, then 10 mg po daily x 3 days     Dispense:  39 tablet     Refill:  0   • cyclobenzaprine (FLEXERIL) 5 MG tablet     Sig: Take 1 tablet by mouth 3 (Three) Times a Day As Needed for Muscle Spasms.     Dispense:  45 tablet     Refill:  0   • oxyCODONE-acetaminophen (PERCOCET) 5-325 MG per tablet     Sig: Take 1 tablet by mouth Every 4 (Four) Hours As Needed for Moderate Pain  or Severe Pain .     Dispense:  42 tablet     Refill:  0       John Chan MD     Scribed for John Chan MD by Tatyana Zepeda.  2022  12:48 EST

## 2022-01-20 ENCOUNTER — HOSPITAL ENCOUNTER (OUTPATIENT)
Dept: PHYSICAL THERAPY | Facility: HOSPITAL | Age: 62
Setting detail: THERAPIES SERIES
Discharge: HOME | End: 2022-01-20

## 2022-01-20 DIAGNOSIS — Z96.651 STATUS POST TOTAL KNEE REPLACEMENT, RIGHT: Primary | ICD-10-CM

## 2022-01-20 LAB — BACTERIA SPEC AEROBE CULT: ABNORMAL

## 2022-01-20 PROCEDURE — 97140 MANUAL THERAPY 1/> REGIONS: CPT

## 2022-01-20 PROCEDURE — 97110 THERAPEUTIC EXERCISES: CPT

## 2022-01-20 NOTE — THERAPY TREATMENT NOTE
"    Outpatient Physical Therapy Ortho Treatment Note   Sandra Wiseman     Patient Name: Jennifer Arndt  : 1960  MRN: [id number]  Today's Date: 2022      Visit Date: 2022    Visit Dx:    ICD-10-CM ICD-9-CM   1. Status post total knee replacement, right  Z96.651 V43.65       Patient Active Problem List   Diagnosis   • Acute pyelonephritis   • Left ureteral stone   • Abnormal serum total protein level   • Primary osteoarthritis of right knee   • Pain   • S/P TKR (total knee replacement), right, DOS         Past Medical History:   Diagnosis Date   • Anesthesia     PT STATES SENSITIVE TO AND B/P DROPS   • Arthritis     GENERALIZED   • Bruises easily    • CKD (chronic kidney disease)     Stage 3   • Depression    • Emphysema lung (HCC)     WAS TOLD SHE HAD THIS PER CT SCAN MAY 2018.... NO INHALERS   • Fibroids    • GERD (gastroesophageal reflux disease)    • H/O menorrhagia    • Hiatal hernia    • Hyperlipidemia    • Kidney calculi    • Kidney stone    • Recurrent UTI    • Right knee pain     \"BONE ON BONE\"   • Sjogren's disease (HCC)    • Spinal headache     WITH         Past Surgical History:   Procedure Laterality Date   • BREAST FIBROADENOMA SURGERY Left     4 cysts removed-all benign   • BREAST SURGERY     •  SECTION      x3   • CHOLECYSTECTOMY      LAP   • COLONOSCOPY  2018   • CYSTOSCOPY N/A 2016    Procedure: CYSTOSCOPY;  Surgeon: Mikey Aguirre MD;  Location: [facility];  Service:    • HYSTERECTOMY      Dr. Foster-menorrhagia   • KIDNEY STONE SURGERY     • TOTAL KNEE ARTHROPLASTY Right 2021    Procedure: TOTAL KNEE ARTHROPLASTY AND ALL ASSOCIATED PROCEDURES;  Surgeon: John Chan MD;  Location: [facility];  Service: Orthopedics;  Laterality: Right;   • URETEROSCOPY LASER LITHOTRIPSY WITH STENT INSERTION Left 2016    Procedure: lt URETEROSCOPY LASER LITHOTRIPSY WITH stone basket extraction w/STENT INSERTION;  Surgeon: Hasmukh Krueger MD;  " Location: [facility] OR;  Service:    • URETEROSCOPY LASER LITHOTRIPSY WITH STENT INSERTION Left 3/1/2019    Procedure: LEFT URETEROSCOPY LASER LITHOTRIPSY STONE BASKET EXTRACTION STENT;  Surgeon: Hasmukh Krueger MD;  Location: [facility] OR;  Service: Urology   • URETEROSCOPY LASER LITHOTRIPSY WITH STENT INSERTION Left 3/15/2019    Procedure: LEFT URETEROSCOPY LASER LITHOTRIPSY WITH STENT INSERTION STONE BASKET EXTRACTION;  Surgeon: Hasmukh Krueger MD;  Location: [facility] OR;  Service: Urology   • WISDOM TOOTH EXTRACTION          PT Ortho     Row Name 01/20/22 0900       Subjective Comments    Subjective Comments Pt reporting that her knee is more tight and sore from increased time up on feet for appts and grocery shopping; pt reports she had her follow up with MD who wants her to continue PT; states she was started on a steroid to help with residual edema (R) knee  -       Subjective Pain    Able to rate subjective pain? yes  -    Pre-Treatment Pain Level 5  -    Post-Treatment Pain Level 2  -          User Key  (r) = Recorded By, (t) = Taken By, (c) = Cosigned By    Initials Name Provider Type     Darío Ruiz PTA Physical Therapy Assistant                             PT Assessment/Plan     Row Name 01/20/22 1347          PT Assessment    Assessment Comments Trial of increased reps vs increased weight and pt tolerated with fatigue but reporting decreased pain after session  -            PT Plan    PT Plan Comments Continue per POC, progressing as tolerated  -           User Key  (r) = Recorded By, (t) = Taken By, (c) = Cosigned By    Initials Name Provider Type     Darío Ruiz PTA Physical Therapy Assistant                   OP Exercises     Row Name 01/20/22 1357 01/20/22 0900          Subjective Comments    Subjective Comments -- Pt reporting that her knee is more tight and sore from increased time up on feet for appts and grocery shopping; pt reports she had her follow up  "with MD who wants her to continue PT; states she was started on a steroid to help with residual edema (R) knee  -            Subjective Pain    Able to rate subjective pain? -- yes  -     Pre-Treatment Pain Level -- 5  -     Post-Treatment Pain Level -- 2  -            Total Minutes    96476 - PT Therapeutic Exercise Minutes 30  -MH --     67674 - PT Manual Therapy Minutes 15  -MH --            Exercise 1    Exercise Name 1 -- Heel Slides  -     Time 1 -- 8 min  -            Exercise 2    Exercise Name 2 -- Wall Slides  -     Time 2 -- 8 min  -            Exercise 3    Exercise Name 3 -- Airdyne - seat 3  -     Cueing 3 -- Verbal  -     Time 3 -- 5 min  -            Exercise 4    Exercise Name 4 -- Manual Flexion  -     Reps 4 -- 10  -     Time 4 -- 10 sec hold each  -            Exercise 5    Exercise Name 5 -- HS Stretch  -     Reps 5 -- 10  -     Time 5 -- 10 sec hold each  -            Exercise 6    Exercise Name 6 -- Heel Prop  -     Time 6 -- 5 min- 2#  -     Additional Comments -- PLH x 5 min 2#  -            Exercise 7    Exercise Name 7 -- Manual Extension  -     Reps 7 -- 10  -     Time 7 -- 10 sec hold each  -            Exercise 8    Exercise Name 8 -- QS (ankle)  -     Reps 8 -- 25  -     Time 8 -- 5 sec hold each  -            Exercise 9    Exercise Name 9 -- SAQ  -            Exercise 10    Exercise Name 10 -- 3-Way Hip  -     Reps 10 -- 30 each  -     Time 10 -- 2#  -            Exercise 11    Exercise Name 11 -- LAQ  -     Reps 11 -- 30  -     Time 11 -- 2#  -            Exercise 12    Exercise Name 12 -- TKE  -     Reps 12 -- 25  -     Time 12 -- Gold  -            Exercise 13    Exercise Name 13 -- Heel Raises  -     Reps 13 -- 25  -            Exercise 14    Exercise Name 14 -- Mini Squats  -     Reps 14 -- 25  -            Exercise 15    Exercise Name 15 -- 6\" Fwd Step Ups  -     Reps 15 -- 25  -            " "Exercise 16    Exercise Name 16 -- 6\" Lat Step Overs  -MH     Reps 16 -- 25  -           User Key  (r) = Recorded By, (t) = Taken By, (c) = Cosigned By    Initials Name Provider Type    Darío Cervantes PTA Physical Therapy Assistant                         Manual Rx (last 36 hours)     Manual Treatments     Row Name 01/20/22 1357             Total Minutes    56321 - PT Manual Therapy Minutes 15  -MH            User Key  (r) = Recorded By, (t) = Taken By, (c) = Cosigned By    Initials Name Provider Type    Darío Cervantes PTA Physical Therapy Assistant                    Therapy Education  Given: HEP, Symptoms/condition management  Program: Reinforced  How Provided: Verbal, Demonstration  Provided to: Patient  Level of Understanding: Teach back education performed, Verbalized, Demonstrated              Time Calculation:   Start Time: 0955  Stop Time: 1110  Time Calculation (min): 75 min  Timed Charges  31213 - PT Therapeutic Exercise Minutes: 30  83595 - PT Manual Therapy Minutes: 15  Total Minutes  Timed Charges Total Minutes: 45   Total Minutes: 45  Therapy Charges for Today     Code Description Service Date Service Provider Modifiers Qty    12081033001 HC PT THER PROC EA 15 MIN 1/20/2022 Darío Ruiz PTA GP 2    56184242076 HC PT MANUAL THERAPY EA 15 MIN 1/20/2022 Darío Ruiz PTA GP 1                    Darío Ruiz PTA  1/20/2022     "

## 2022-01-25 ENCOUNTER — HOSPITAL ENCOUNTER (OUTPATIENT)
Dept: PHYSICAL THERAPY | Facility: HOSPITAL | Age: 62
Setting detail: THERAPIES SERIES
Discharge: HOME | End: 2022-01-25

## 2022-01-25 DIAGNOSIS — Z96.651 STATUS POST TOTAL KNEE REPLACEMENT, RIGHT: Primary | ICD-10-CM

## 2022-01-25 PROCEDURE — 97110 THERAPEUTIC EXERCISES: CPT

## 2022-01-25 PROCEDURE — 97140 MANUAL THERAPY 1/> REGIONS: CPT

## 2022-01-25 NOTE — THERAPY TREATMENT NOTE
"    Outpatient Physical Therapy Ortho Treatment Note   Sandra Wiseman     Patient Name: Jennifer Arndt  : 1960  MRN: [id number]  Today's Date: 2022      Visit Date: 2022    Visit Dx:    ICD-10-CM ICD-9-CM   1. Status post total knee replacement, right  Z96.651 V43.65       Patient Active Problem List   Diagnosis   • Acute pyelonephritis   • Left ureteral stone   • Abnormal serum total protein level   • Primary osteoarthritis of right knee   • Pain   • S/P TKR (total knee replacement), right, DOS         Past Medical History:   Diagnosis Date   • Anesthesia     PT STATES SENSITIVE TO AND B/P DROPS   • Arthritis     GENERALIZED   • Bruises easily    • CKD (chronic kidney disease)     Stage 3   • Depression    • Emphysema lung (HCC)     WAS TOLD SHE HAD THIS PER CT SCAN MAY 2018.... NO INHALERS   • Fibroids    • GERD (gastroesophageal reflux disease)    • H/O menorrhagia    • Hiatal hernia    • Hyperlipidemia    • Kidney calculi    • Kidney stone    • Recurrent UTI    • Right knee pain     \"BONE ON BONE\"   • Sjogren's disease (HCC)    • Spinal headache     WITH         Past Surgical History:   Procedure Laterality Date   • BREAST FIBROADENOMA SURGERY Left     4 cysts removed-all benign   • BREAST SURGERY     •  SECTION      x3   • CHOLECYSTECTOMY      LAP   • COLONOSCOPY  2018   • CYSTOSCOPY N/A 2016    Procedure: CYSTOSCOPY;  Surgeon: Mikey Aguirre MD;  Location: [facility];  Service:    • HYSTERECTOMY      Dr. Foster-menorrhagia   • KIDNEY STONE SURGERY     • TOTAL KNEE ARTHROPLASTY Right 2021    Procedure: TOTAL KNEE ARTHROPLASTY AND ALL ASSOCIATED PROCEDURES;  Surgeon: John Chan MD;  Location: [facility];  Service: Orthopedics;  Laterality: Right;   • URETEROSCOPY LASER LITHOTRIPSY WITH STENT INSERTION Left 2016    Procedure: lt URETEROSCOPY LASER LITHOTRIPSY WITH stone basket extraction w/STENT INSERTION;  Surgeon: Hasmukh Krueger MD;  " Location: [facility] OR;  Service:    • URETEROSCOPY LASER LITHOTRIPSY WITH STENT INSERTION Left 3/1/2019    Procedure: LEFT URETEROSCOPY LASER LITHOTRIPSY STONE BASKET EXTRACTION STENT;  Surgeon: Hasmukh Krueger MD;  Location: [facility] OR;  Service: Urology   • URETEROSCOPY LASER LITHOTRIPSY WITH STENT INSERTION Left 3/15/2019    Procedure: LEFT URETEROSCOPY LASER LITHOTRIPSY WITH STENT INSERTION STONE BASKET EXTRACTION;  Surgeon: Hasmukh Krueger MD;  Location: [facility] OR;  Service: Urology   • WISDOM TOOTH EXTRACTION                          PT Assessment/Plan     Row Name 01/25/22 1005          PT Assessment    Assessment Comments Pt presents with decreased edema; improved passive range noted.  -KM            PT Plan    PT Plan Comments Continue per POC  -KM           User Key  (r) = Recorded By, (t) = Taken By, (c) = Cosigned By    Initials Name Provider Type    Jasmyne Carrera PTA Physical Therapy Assistant                   OP Exercises     Row Name 01/25/22 1005             Subjective Comments    Subjective Comments Pt states her knee is doing better after the steroids with decreased swelling.  -KM              Exercise 1    Exercise Name 1 Heel Slides  -KM      Time 1 5 min  -KM              Exercise 2    Exercise Name 2 Wall Slides  -KM      Time 2 5 min  -KM              Exercise 3    Exercise Name 3 Airdyne - seat 3  -KM      Cueing 3 Verbal  -KM      Time 3 5 min  -KM              Exercise 4    Exercise Name 4 Manual Flexion  -KM      Reps 4 10  -KM      Time 4 10 sec hold each  -KM              Exercise 5    Exercise Name 5 HS Stretch  -KM      Reps 5 10  -KM      Time 5 10 sec hold each  -KM              Exercise 6    Exercise Name 6 Heel Prop  -KM      Time 6 5 min- 3#  -KM      Additional Comments PLH x 5 min 3#  -KM              Exercise 7    Exercise Name 7 Manual Extension  -KM      Reps 7 10  -KM      Time 7 10 sec hold each  -KM              Exercise 8    Exercise Name 8 QS  "(ankle)  -KM      Reps 8 25  -KM      Time 8 5 sec hold each  -KM              Exercise 9    Exercise Name 9 SAQ  -KM              Exercise 10    Exercise Name 10 3-Way Hip  -KM      Reps 10 30 each  -KM      Time 10 2#  -KM              Exercise 11    Exercise Name 11 LAQ  -KM      Reps 11 30  -KM      Time 11 2#  -KM              Exercise 12    Exercise Name 12 TKE  -KM      Reps 12 25  -KM      Time 12 Gold  -KM              Exercise 13    Exercise Name 13 Heel Raises  -KM      Reps 13 25  -KM              Exercise 14    Exercise Name 14 Mini Squats  -KM      Reps 14 25  -KM              Exercise 15    Exercise Name 15 6\" Fwd Step Ups  -KM      Reps 15 25  -KM              Exercise 16    Exercise Name 16 6\" Lat Step Overs  -KM      Reps 16 25  -KM            User Key  (r) = Recorded By, (t) = Taken By, (c) = Cosigned By    Initials Name Provider Type    Jasmyne Carrera PTA Physical Therapy Assistant                                                Time Calculation:   Start Time: 1005  Stop Time: 1132  Time Calculation (min): 87 min  Therapy Charges for Today     Code Description Service Date Service Provider Modifiers Qty    94261555085 HC PT MANUAL THERAPY EA 15 MIN 1/25/2022 Jasmyne Aguila PTA GP 1    40743619884 HC PT THER PROC EA 15 MIN 1/25/2022 Jasmyne Aguila PTA GP 1                    Jasmyne Aguila PTA  1/25/2022     "

## 2022-01-27 ENCOUNTER — HOSPITAL ENCOUNTER (OUTPATIENT)
Dept: PHYSICAL THERAPY | Facility: HOSPITAL | Age: 62
Setting detail: THERAPIES SERIES
Discharge: HOME | End: 2022-01-27

## 2022-01-27 DIAGNOSIS — Z96.651 STATUS POST TOTAL KNEE REPLACEMENT, RIGHT: Primary | ICD-10-CM

## 2022-01-27 PROCEDURE — 97110 THERAPEUTIC EXERCISES: CPT

## 2022-01-27 NOTE — THERAPY RE-EVALUATION
"    Outpatient Physical Therapy Ortho Re-Evaluation   Sandra Wiseman     Patient Name: Jennifer Arndt  : 1960  MRN: [id number]  Today's Date: 2022      Visit Date: 2022    Patient Active Problem List   Diagnosis   • Acute pyelonephritis   • Left ureteral stone   • Abnormal serum total protein level   • Primary osteoarthritis of right knee   • Pain   • S/P TKR (total knee replacement), right, DOS         Past Medical History:   Diagnosis Date   • Anesthesia     PT STATES SENSITIVE TO AND B/P DROPS   • Arthritis     GENERALIZED   • Bruises easily    • CKD (chronic kidney disease)     Stage 3   • Depression    • Emphysema lung (HCC)     WAS TOLD SHE HAD THIS PER CT SCAN MAY 2018.... NO INHALERS   • Fibroids    • GERD (gastroesophageal reflux disease)    • H/O menorrhagia    • Hiatal hernia    • Hyperlipidemia    • Kidney calculi    • Kidney stone    • Recurrent UTI    • Right knee pain     \"BONE ON BONE\"   • Sjogren's disease (HCC)    • Spinal headache     WITH         Past Surgical History:   Procedure Laterality Date   • BREAST FIBROADENOMA SURGERY Left     4 cysts removed-all benign   • BREAST SURGERY     •  SECTION      x3   • CHOLECYSTECTOMY  2007    LAP   • COLONOSCOPY  2018   • CYSTOSCOPY N/A 2016    Procedure: CYSTOSCOPY;  Surgeon: Mikey Aguirre MD;  Location: [facility];  Service:    • HYSTERECTOMY      Dr. Foster-menorrhagia   • KIDNEY STONE SURGERY     • TOTAL KNEE ARTHROPLASTY Right 2021    Procedure: TOTAL KNEE ARTHROPLASTY AND ALL ASSOCIATED PROCEDURES;  Surgeon: John Chan MD;  Location: [facility] OR;  Service: Orthopedics;  Laterality: Right;   • URETEROSCOPY LASER LITHOTRIPSY WITH STENT INSERTION Left 2016    Procedure: lt URETEROSCOPY LASER LITHOTRIPSY WITH stone basket extraction w/STENT INSERTION;  Surgeon: Hasmukh Krueger MD;  Location: [facility] OR;  Service:    • URETEROSCOPY LASER LITHOTRIPSY WITH STENT INSERTION Left " 3/1/2019    Procedure: LEFT URETEROSCOPY LASER LITHOTRIPSY STONE BASKET EXTRACTION STENT;  Surgeon: Hasmukh Krueger MD;  Location: [facility] MAIN OR;  Service: Urology   • URETEROSCOPY LASER LITHOTRIPSY WITH STENT INSERTION Left 3/15/2019    Procedure: LEFT URETEROSCOPY LASER LITHOTRIPSY WITH STENT INSERTION STONE BASKET EXTRACTION;  Surgeon: Hasmukh Krueger MD;  Location: [facility] MAIN OR;  Service: Urology   • WISDOM TOOTH EXTRACTION         Visit Dx:     ICD-10-CM ICD-9-CM   1. Status post total knee replacement, right  Z96.651 V43.65              PT Ortho     Row Name 01/27/22 1000       Precautions and Contraindications    Precautions/Limitations no known precautions/limitations  -AS       Subjective Pain    Able to rate subjective pain? --  -AS       Posture/Observations    Posture- WNL Posture is WNL  -AS    Observations Incision healing  -AS       Patellar Accessory Motions    Superior glide Right:; WNL  -AS    Inferior glide Right:; WNL  -AS    Medial glide Right:; WNL  -AS    Lateral glide Right:; WNL  -AS       Right Lower Ext    Rt Knee Extension/Flexion AROM 0-125 degrees  -AS       MMT Right Lower Ext    Rt Hip Flexion MMT, Gross Movement (4/5) good  -AS    Rt Hip Extension MMT, Gross Movement (4/5) good  -AS    Rt Hip ABduction MMT, Gross Movement (4/5) good  -AS    Rt Knee Extension MMT, Gross Movement (4/5) good  -AS    Rt Knee Flexion MMT, Gross Movement (4/5) good  -AS       Sensation    Sensation WNL? WNL  -AS    Light Touch No apparent deficits  -AS       Lower Extremity Flexibility    Hamstrings Left:; Mildly limited  -AS       Gait/Stairs (Locomotion)    Comment (Gait/Stairs) Patient ambulates without the use of an AD. She has limited heel strike with shortened step and stride length on her right side.  -AS          User Key  (r) = Recorded By, (t) = Taken By, (c) = Cosigned By    Initials Name Provider Type    AS Robert Lang, PT Physical Therapist                                    PT OP Goals     Row Name 01/27/22 1000          PT Short Term Goals    STG 1 Patient to demonstrate compliance with her initial HEP for flexibility, ROM and strengthening.  -AS     STG 1 Progress Met  -AS     STG 2 Patient to report right knee pain on VAS of 5-6/10 at worst with activity.  -AS     STG 2 Progress Met  -AS     STG 3 Patient to demonstrate improved right knee and RLE strength to 4/5 in all planes.  -AS     STG 3 Progress Met  -AS     STG 4 Patient to demonstrate improved right knee ROM to 0-1-120 degrees.  -AS     STG 4 Progress Partially Met; Ongoing; Progressing  -AS     STG 5 Patient to ambulate short distances without the use of an AD and with an improved gait pattern.  -AS     STG 5 Progress Met  -AS            Long Term Goals    LTG 1 Patient to demonstrate compliance with her advanced HEP for flexibility, ROM and strengthening.  -AS     LTG 1 Progress Met  -AS     LTG 2 Patient to report right knee pain on VAS of 2-3/10 at worst with activity.  -AS     LTG 2 Progress Ongoing; Progressing  -AS     LTG 3 Patient to demonstrate improved right knee and RLE strength to 4+/5 in all planes.  -AS     LTG 3 Progress Ongoing  -AS     LTG 4 Patient to demonstrate improved right knee ROM to 0-125 degrees.  -AS     LTG 4 Progress Partially Met; Ongoing  -AS     LTG 5 Patient to ambulate community distances without an AD and with a normal gait pattern.  -AS     LTG 5 Progress Partially Met; Ongoing  -AS     LTG 6 Patient to report overall improved function on LEFS to 55/80.  -AS     LTG 6 Progress Met  -AS           User Key  (r) = Recorded By, (t) = Taken By, (c) = Cosigned By    Initials Name Provider Type    AS Robert Lang, PT Physical Therapist                 PT Assessment/Plan     Row Name 01/27/22 1000          PT Assessment    Functional Limitations Impaired gait; Limitation in home management; Limitations in community activities; Performance in leisure activities; Performance in sport  "activities; Performance in work activities  -AS     Impairments Gait; Impaired flexibility; Muscle strength; Pain; Range of motion  -AS     Assessment Comments Patient continues to do well with PT in regards to knee flexion ROM, strength, pain, gait, and function. Patient does continue to struggle with full knee extension but she feels this has improved recently.  -AS     Please refer to paper survey for additional self-reported information Yes  -AS     Rehab Potential Good  -AS     Patient/caregiver participated in establishment of treatment plan and goals Yes  -AS     Patient would benefit from skilled therapy intervention Yes  -AS            PT Plan    PT Frequency 1x/week; 2x/week  -AS     Predicted Duration of Therapy Intervention (PT) 4-6 weeks  -AS     Planned CPT's? PT RE-EVAL: 58177; PT THER PROC EA 15 MIN: 52536; PT THER ACT EA 15 MIN: 47874; PT MANUAL THERAPY EA 15 MIN: 42420; PT NEUROMUSC RE-EDUCATION EA 15 MIN: 64362; PT GAIT TRAINING EA 15 MIN: 99585  -AS     PT Plan Comments Continue with current treatment plan.  -AS           User Key  (r) = Recorded By, (t) = Taken By, (c) = Cosigned By    Initials Name Provider Type    AS Robert Lang, PT Physical Therapist                   OP Exercises     Row Name 01/27/22 1000             Subjective Comments    Subjective Comments Patient states her knee is feeling \"really good today\".  -AS              Subjective Pain    Able to rate subjective pain? --  -AS              Exercise 1    Exercise Name 1 Heel Slides  -AS      Time 1 5 min  -AS              Exercise 2    Exercise Name 2 Wall Slides  -AS      Time 2 5 min  -AS              Exercise 3    Exercise Name 3 Airdyne - seat 3  -AS      Cueing 3 Verbal  -AS      Time 3 5 min  -AS              Exercise 4    Exercise Name 4 Manual Flexion  -AS      Reps 4 10  -AS      Time 4 10 sec hold each  -AS              Exercise 5    Exercise Name 5 HS Stretch  -AS      Reps 5 10  -AS      Time 5 10 sec hold " "each  -AS              Exercise 6    Exercise Name 6 Heel Prop  -AS      Time 6 5 min- 3#  -AS      Additional Comments PLH x 5 min 3#  -AS              Exercise 7    Exercise Name 7 Manual Extension  -AS      Reps 7 10  -AS      Time 7 10 sec hold each  -AS              Exercise 8    Exercise Name 8 QS (ankle)  -AS      Reps 8 25  -AS      Time 8 5 sec hold each  -AS              Exercise 9    Exercise Name 9 SAQ  -AS              Exercise 10    Exercise Name 10 3-Way Hip  -AS      Reps 10 30 each  -AS      Time 10 2#  -AS              Exercise 11    Exercise Name 11 LAQ  -AS      Reps 11 30  -AS      Time 11 2#  -AS              Exercise 12    Exercise Name 12 TKE  -AS      Reps 12 25  -AS      Time 12 Gold  -AS              Exercise 13    Exercise Name 13 Heel Raises  -AS      Reps 13 25  -AS              Exercise 14    Exercise Name 14 Mini Squats  -AS      Reps 14 25  -AS              Exercise 15    Exercise Name 15 6\" Fwd Step Ups  -AS      Reps 15 25  -AS              Exercise 16    Exercise Name 16 6\" Lat Step Overs  -AS      Reps 16 25  -AS            User Key  (r) = Recorded By, (t) = Taken By, (c) = Cosigned By    Initials Name Provider Type    AS Robert Lang, PT Physical Therapist                                        Time Calculation:     Start Time: 1002  Stop Time: 1100  Time Calculation (min): 58 min     Therapy Charges for Today     Code Description Service Date Service Provider Modifiers Qty    42558448392  PT THER PROC EA 15 MIN 1/27/2022 Robert Lang, PT GP 2                    Robert Lang, PT  1/27/2022      "

## 2022-02-01 ENCOUNTER — HOSPITAL ENCOUNTER (OUTPATIENT)
Dept: PHYSICAL THERAPY | Facility: HOSPITAL | Age: 62
Setting detail: THERAPIES SERIES
Discharge: HOME | End: 2022-02-01

## 2022-02-01 DIAGNOSIS — Z96.651 STATUS POST TOTAL KNEE REPLACEMENT, RIGHT: Primary | ICD-10-CM

## 2022-02-01 PROCEDURE — 97110 THERAPEUTIC EXERCISES: CPT

## 2022-02-01 PROCEDURE — 97140 MANUAL THERAPY 1/> REGIONS: CPT

## 2022-02-01 NOTE — THERAPY TREATMENT NOTE
"    Outpatient Physical Therapy Ortho Treatment Note   Sandra Wiseman     Patient Name: Jennifer Arndt  : 1960  MRN: [id number]  Today's Date: 2022      Visit Date: 2022    Visit Dx:  No diagnosis found.    Patient Active Problem List   Diagnosis   • Acute pyelonephritis   • Left ureteral stone   • Abnormal serum total protein level   • Primary osteoarthritis of right knee   • Pain   • S/P TKR (total knee replacement), right, DOS         Past Medical History:   Diagnosis Date   • Anesthesia     PT STATES SENSITIVE TO AND B/P DROPS   • Arthritis     GENERALIZED   • Bruises easily    • CKD (chronic kidney disease)     Stage 3   • Depression    • Emphysema lung (HCC)     WAS TOLD SHE HAD THIS PER CT SCAN MAY 2018.... NO INHALERS   • Fibroids    • GERD (gastroesophageal reflux disease)    • H/O menorrhagia    • Hiatal hernia    • Hyperlipidemia    • Kidney calculi    • Kidney stone    • Recurrent UTI    • Right knee pain     \"BONE ON BONE\"   • Sjogren's disease (HCC)    • Spinal headache     WITH         Past Surgical History:   Procedure Laterality Date   • BREAST FIBROADENOMA SURGERY Left     4 cysts removed-all benign   • BREAST SURGERY     •  SECTION      x3   • CHOLECYSTECTOMY  2007    LAP   • COLONOSCOPY  2018   • CYSTOSCOPY N/A 2016    Procedure: CYSTOSCOPY;  Surgeon: Mikey Aguirre MD;  Location: [facility];  Service:    • HYSTERECTOMY      Dr. Foster-menorrhagia   • KIDNEY STONE SURGERY     • TOTAL KNEE ARTHROPLASTY Right 2021    Procedure: TOTAL KNEE ARTHROPLASTY AND ALL ASSOCIATED PROCEDURES;  Surgeon: John Chan MD;  Location: [facility] OR;  Service: Orthopedics;  Laterality: Right;   • URETEROSCOPY LASER LITHOTRIPSY WITH STENT INSERTION Left 2016    Procedure: lt URETEROSCOPY LASER LITHOTRIPSY WITH stone basket extraction w/STENT INSERTION;  Surgeon: Hasmukh Krueger MD;  Location: [facility] OR;  Service:    • URETEROSCOPY LASER LITHOTRIPSY " WITH STENT INSERTION Left 3/1/2019    Procedure: LEFT URETEROSCOPY LASER LITHOTRIPSY STONE BASKET EXTRACTION STENT;  Surgeon: Hasmukh Krueger MD;  Location: [facility] OR;  Service: Urology   • URETEROSCOPY LASER LITHOTRIPSY WITH STENT INSERTION Left 3/15/2019    Procedure: LEFT URETEROSCOPY LASER LITHOTRIPSY WITH STENT INSERTION STONE BASKET EXTRACTION;  Surgeon: Hasmukh Krueger MD;  Location: [facility] OR;  Service: Urology   • WISDOM TOOTH EXTRACTION                          PT Assessment/Plan     Row Name 02/01/22 1015          PT Assessment    Assessment Comments Pt still slightly limited with extension ROM despite additonal stretching. Good tolerance to prescribed exercises  -KM            PT Plan    PT Plan Comments Continue per POC  -KM           User Key  (r) = Recorded By, (t) = Taken By, (c) = Cosigned By    Initials Name Provider Type    Jasmyne Carrera, PTA Physical Therapy Assistant                   OP Exercises     Row Name 02/01/22 1015             Subjective Comments    Subjective Comments Pt states her knee feels more stiff today, states she completed additional walking over the weekend.  -KM              Exercise 1    Exercise Name 1 Heel Slides  -KM      Time 1 5 min  -KM              Exercise 2    Exercise Name 2 Wall Slides  -KM      Time 2 5 min  -KM              Exercise 3    Exercise Name 3 Airdyne - seat 3  -KM      Cueing 3 Verbal  -KM      Time 3 5 min  -KM              Exercise 4    Exercise Name 4 Manual Flexion  -KM      Reps 4 10  -KM      Time 4 10 sec hold each  -KM              Exercise 5    Exercise Name 5 HS Stretch  -KM      Reps 5 10  -KM      Time 5 10 sec hold each  -KM              Exercise 6    Exercise Name 6 Heel Prop  -KM      Time 6 5 min- 4#  -KM      Additional Comments PLH x 5 min 4#  -KM              Exercise 7    Exercise Name 7 Manual Extension  -KM      Reps 7 10  -KM      Time 7 10 sec hold each  -KM              Exercise 8    Exercise Name 8 QS  "(ankle)  -KM      Reps 8 25  -KM      Time 8 5 sec hold each  -KM              Exercise 9    Exercise Name 9 SAQ  -KM              Exercise 10    Exercise Name 10 3-Way Hip  -KM      Reps 10 30 each  -KM      Time 10 2#  -KM              Exercise 11    Exercise Name 11 LAQ  -KM      Reps 11 30  -KM      Time 11 2#  -KM              Exercise 12    Exercise Name 12 TKE  -KM      Reps 12 25  -KM      Time 12 Gold  -KM              Exercise 13    Exercise Name 13 Heel Raises  -KM      Reps 13 25  -KM              Exercise 14    Exercise Name 14 Mini Squats  -KM      Reps 14 25  -KM              Exercise 15    Exercise Name 15 6\" Fwd Step Ups  -KM      Reps 15 25  -KM              Exercise 16    Exercise Name 16 6\" Lat Step Overs  -KM      Reps 16 25  -KM            User Key  (r) = Recorded By, (t) = Taken By, (c) = Cosigned By    Initials Name Provider Type    Jasmyne Carrera PTA Physical Therapy Assistant                                                Time Calculation:   Start Time: 1015  Stop Time: 1125  Time Calculation (min): 70 min  Therapy Charges for Today     Code Description Service Date Service Provider Modifiers Qty    07233277264 HC PT MANUAL THERAPY EA 15 MIN 2/1/2022 Jasmyne Aguila PTA GP 1    27528208884 HC PT THER PROC EA 15 MIN 2/1/2022 Jasmyne Aguila PTA GP 1                    Jasmyne Aguila PTA  2/1/2022     "

## 2022-02-01 NOTE — THERAPY TREATMENT NOTE
"    Outpatient Physical Therapy Ortho Treatment Note   Sandra Wiseman     Patient Name: Jennifer Arndt  : 1960  MRN: [id number]  Today's Date: 2022      Visit Date: 2022    Visit Dx:    ICD-10-CM ICD-9-CM   1. Status post total knee replacement, right  Z96.651 V43.65       Patient Active Problem List   Diagnosis   • Acute pyelonephritis   • Left ureteral stone   • Abnormal serum total protein level   • Primary osteoarthritis of right knee   • Pain   • S/P TKR (total knee replacement), right, DOS         Past Medical History:   Diagnosis Date   • Anesthesia     PT STATES SENSITIVE TO AND B/P DROPS   • Arthritis     GENERALIZED   • Bruises easily    • CKD (chronic kidney disease)     Stage 3   • Depression    • Emphysema lung (HCC)     WAS TOLD SHE HAD THIS PER CT SCAN MAY 2018.... NO INHALERS   • Fibroids    • GERD (gastroesophageal reflux disease)    • H/O menorrhagia    • Hiatal hernia    • Hyperlipidemia    • Kidney calculi    • Kidney stone    • Recurrent UTI    • Right knee pain     \"BONE ON BONE\"   • Sjogren's disease (HCC)    • Spinal headache     WITH         Past Surgical History:   Procedure Laterality Date   • BREAST FIBROADENOMA SURGERY Left     4 cysts removed-all benign   • BREAST SURGERY     •  SECTION      x3   • CHOLECYSTECTOMY      LAP   • COLONOSCOPY  2018   • CYSTOSCOPY N/A 2016    Procedure: CYSTOSCOPY;  Surgeon: Mikey Aguirre MD;  Location: [facility];  Service:    • HYSTERECTOMY      Dr. Foster-menorrhagia   • KIDNEY STONE SURGERY     • TOTAL KNEE ARTHROPLASTY Right 2021    Procedure: TOTAL KNEE ARTHROPLASTY AND ALL ASSOCIATED PROCEDURES;  Surgeon: John Chan MD;  Location: [facility];  Service: Orthopedics;  Laterality: Right;   • URETEROSCOPY LASER LITHOTRIPSY WITH STENT INSERTION Left 2016    Procedure: lt URETEROSCOPY LASER LITHOTRIPSY WITH stone basket extraction w/STENT INSERTION;  Surgeon: Hasmukh Krueger MD;  " Location: [facility] OR;  Service:    • URETEROSCOPY LASER LITHOTRIPSY WITH STENT INSERTION Left 3/1/2019    Procedure: LEFT URETEROSCOPY LASER LITHOTRIPSY STONE BASKET EXTRACTION STENT;  Surgeon: Hasmukh Kruegre MD;  Location: [facility] OR;  Service: Urology   • URETEROSCOPY LASER LITHOTRIPSY WITH STENT INSERTION Left 3/15/2019    Procedure: LEFT URETEROSCOPY LASER LITHOTRIPSY WITH STENT INSERTION STONE BASKET EXTRACTION;  Surgeon: Hasmukh Krueger MD;  Location: [facility] OR;  Service: Urology   • WISDOM TOOTH EXTRACTION                          PT Assessment/Plan     Row Name 02/01/22 1015          PT Assessment    Assessment Comments Pt still slightly limited with extension ROM despite additonal stretching. Good tolerance to prescribed exercises  -KM            PT Plan    PT Plan Comments Continue per POC  -KM           User Key  (r) = Recorded By, (t) = Taken By, (c) = Cosigned By    Initials Name Provider Type    Jasmyne Carrera, LUIS Physical Therapy Assistant                   OP Exercises     Row Name 02/01/22 1015             Subjective Comments    Subjective Comments Pt states her knee feels more stiff today, states she completed additional walking over the weekend.  -KM              Exercise 1    Exercise Name 1 Heel Slides  -KM      Time 1 5 min  -KM              Exercise 2    Exercise Name 2 Wall Slides  -KM      Time 2 5 min  -KM              Exercise 3    Exercise Name 3 Airdyne - seat 3  -KM      Cueing 3 Verbal  -KM      Time 3 5 min  -KM              Exercise 4    Exercise Name 4 Manual Flexion  -KM      Reps 4 10  -KM      Time 4 10 sec hold each  -KM              Exercise 5    Exercise Name 5 HS Stretch  -KM      Reps 5 10  -KM      Time 5 10 sec hold each  -KM              Exercise 6    Exercise Name 6 Heel Prop  -KM      Time 6 5 min- 4#  -KM      Additional Comments PLH x 5 min 4#  -KM              Exercise 7    Exercise Name 7 Manual Extension  -KM      Reps 7 10  -KM      Time 7  "10 sec hold each  -KM              Exercise 8    Exercise Name 8 QS (ankle)  -KM      Reps 8 25  -KM      Time 8 5 sec hold each  -KM              Exercise 9    Exercise Name 9 SAQ  -KM              Exercise 10    Exercise Name 10 3-Way Hip  -KM      Reps 10 30 each  -KM      Time 10 2#  -KM              Exercise 11    Exercise Name 11 LAQ  -KM      Reps 11 30  -KM      Time 11 2#  -KM              Exercise 12    Exercise Name 12 TKE  -KM      Reps 12 25  -KM      Time 12 Gold  -KM              Exercise 13    Exercise Name 13 Heel Raises  -KM      Reps 13 25  -KM              Exercise 14    Exercise Name 14 Mini Squats  -KM      Reps 14 25  -KM              Exercise 15    Exercise Name 15 6\" Fwd Step Ups  -KM      Reps 15 25  -KM              Exercise 16    Exercise Name 16 6\" Lat Step Overs  -KM      Reps 16 25  -KM            User Key  (r) = Recorded By, (t) = Taken By, (c) = Cosigned By    Initials Name Provider Type    Jasmyne Carrera PTA Physical Therapy Assistant                                                Time Calculation:   Start Time: 1015  Stop Time: 1125  Time Calculation (min): 70 min  Therapy Charges for Today     Code Description Service Date Service Provider Modifiers Qty    24047432118 HC PT MANUAL THERAPY EA 15 MIN 2/1/2022 Jasmyne Aguila PTA GP 1    52879241990 HC PT THER PROC EA 15 MIN 2/1/2022 Jasmyne Aguila PTA GP 1                    Jasmyne Aguila PTA  2/1/2022     "

## 2022-02-07 ENCOUNTER — HOSPITAL ENCOUNTER (OUTPATIENT)
Dept: PHYSICAL THERAPY | Facility: HOSPITAL | Age: 62
Setting detail: THERAPIES SERIES
Discharge: HOME | End: 2022-02-07

## 2022-02-07 DIAGNOSIS — Z96.651 STATUS POST TOTAL KNEE REPLACEMENT, RIGHT: Primary | ICD-10-CM

## 2022-02-07 PROCEDURE — 97110 THERAPEUTIC EXERCISES: CPT

## 2022-02-07 PROCEDURE — 97140 MANUAL THERAPY 1/> REGIONS: CPT

## 2022-02-08 NOTE — THERAPY TREATMENT NOTE
"    Outpatient Physical Therapy Ortho Treatment Note   Sandra Wiseman     Patient Name: Jennifer Arndt  : 1960  MRN: [id number]  Today's Date: 2022      Visit Date: 2022    Visit Dx:    ICD-10-CM ICD-9-CM   1. Status post total knee replacement, right  Z96.651 V43.65       Patient Active Problem List   Diagnosis   • Acute pyelonephritis   • Left ureteral stone   • Abnormal serum total protein level   • Primary osteoarthritis of right knee   • Pain   • S/P TKR (total knee replacement), right, DOS         Past Medical History:   Diagnosis Date   • Anesthesia     PT STATES SENSITIVE TO AND B/P DROPS   • Arthritis     GENERALIZED   • Bruises easily    • CKD (chronic kidney disease)     Stage 3   • Depression    • Emphysema lung (HCC)     WAS TOLD SHE HAD THIS PER CT SCAN MAY 2018.... NO INHALERS   • Fibroids    • GERD (gastroesophageal reflux disease)    • H/O menorrhagia    • Hiatal hernia    • Hyperlipidemia    • Kidney calculi    • Kidney stone    • Recurrent UTI    • Right knee pain     \"BONE ON BONE\"   • Sjogren's disease (HCC)    • Spinal headache     WITH         Past Surgical History:   Procedure Laterality Date   • BREAST FIBROADENOMA SURGERY Left     4 cysts removed-all benign   • BREAST SURGERY     •  SECTION      x3   • CHOLECYSTECTOMY      LAP   • COLONOSCOPY  2018   • CYSTOSCOPY N/A 2016    Procedure: CYSTOSCOPY;  Surgeon: Mikey Aguirre MD;  Location: [facility];  Service:    • HYSTERECTOMY      Dr. Foster-menorrhagia   • KIDNEY STONE SURGERY     • TOTAL KNEE ARTHROPLASTY Right 2021    Procedure: TOTAL KNEE ARTHROPLASTY AND ALL ASSOCIATED PROCEDURES;  Surgeon: John Chan MD;  Location: [facility];  Service: Orthopedics;  Laterality: Right;   • URETEROSCOPY LASER LITHOTRIPSY WITH STENT INSERTION Left 2016    Procedure: lt URETEROSCOPY LASER LITHOTRIPSY WITH stone basket extraction w/STENT INSERTION;  Surgeon: Hasmukh Krueger MD;  " Location: [facility] OR;  Service:    • URETEROSCOPY LASER LITHOTRIPSY WITH STENT INSERTION Left 3/1/2019    Procedure: LEFT URETEROSCOPY LASER LITHOTRIPSY STONE BASKET EXTRACTION STENT;  Surgeon: Hasmukh Krueger MD;  Location: [facility] OR;  Service: Urology   • URETEROSCOPY LASER LITHOTRIPSY WITH STENT INSERTION Left 3/15/2019    Procedure: LEFT URETEROSCOPY LASER LITHOTRIPSY WITH STENT INSERTION STONE BASKET EXTRACTION;  Surgeon: Hasmukh Krueger MD;  Location: [facility] OR;  Service: Urology   • WISDOM TOOTH EXTRACTION                          PT Assessment/Plan     Row Name 02/07/22 1005          PT Assessment    Assessment Comments Pt still limited some in extension ROM. Encouraged the importance of completed HEP daily.  -KM            PT Plan    PT Plan Comments Continue per POC  -KM           User Key  (r) = Recorded By, (t) = Taken By, (c) = Cosigned By    Initials Name Provider Type    Jasmyne Carrera, LUIS Physical Therapy Assistant                   OP Exercises     Row Name 02/07/22 1005             Subjective Comments    Subjective Comments Pt states he notices increased swelling and stiffness in the morning and evening.  -KM              Exercise 1    Exercise Name 1 Heel Slides  -KM      Time 1 5 min  -KM              Exercise 2    Exercise Name 2 Wall Slides  -KM      Time 2 5 min  -KM              Exercise 3    Exercise Name 3 Airdyne - seat 3  -KM      Cueing 3 Verbal  -KM      Time 3 5 min  -KM              Exercise 4    Exercise Name 4 Manual Flexion  -KM      Reps 4 10  -KM      Time 4 10 sec hold each  -KM              Exercise 5    Exercise Name 5 HS Stretch  -KM      Reps 5 10  -KM      Time 5 10 sec hold each  -KM              Exercise 6    Exercise Name 6 Heel Prop  -KM      Time 6 5 min- 5#  -KM      Additional Comments PLH x 5 min 5#  -KM              Exercise 7    Exercise Name 7 Manual Extension  -KM      Reps 7 10  -KM      Time 7 10 sec hold each  -KM              Exercise  "8    Exercise Name 8 QS (ankle)  -KM      Reps 8 25  -KM      Time 8 5 sec hold each  -KM              Exercise 9    Exercise Name 9 SAQ  -KM              Exercise 10    Exercise Name 10 3-Way Hip  -KM      Reps 10 30 each  -KM      Time 10 2#  -KM              Exercise 11    Exercise Name 11 LAQ  -KM      Reps 11 30  -KM      Time 11 2#  -KM              Exercise 12    Exercise Name 12 TKE  -KM      Reps 12 25  -KM      Time 12 Gold  -KM              Exercise 13    Exercise Name 13 Heel Raises  -KM      Reps 13 25  -KM              Exercise 14    Exercise Name 14 Mini Squats  -KM      Reps 14 25  -KM              Exercise 15    Exercise Name 15 6\" Fwd Step Ups  -KM      Reps 15 25  -KM              Exercise 16    Exercise Name 16 6\" Lat Step Overs  -KM      Reps 16 25  -KM            User Key  (r) = Recorded By, (t) = Taken By, (c) = Cosigned By    Initials Name Provider Type    Jasmyne Carrera PTA Physical Therapy Assistant                                                Time Calculation:   Start Time: 1005  Stop Time: 1115  Time Calculation (min): 70 min  Therapy Charges for Today     Code Description Service Date Service Provider Modifiers Qty    05672226868 HC PT MANUAL THERAPY EA 15 MIN 2/7/2022 Jasmyne Aguila PTA GP 1    06944004869 HC PT THER PROC EA 15 MIN 2/7/2022 Jasmyne Aguila PTA GP 1                    Jasmyne Aguila PTA  2/8/2022     "

## 2022-02-14 ENCOUNTER — OFFICE VISIT (OUTPATIENT)
Dept: ORTHOPEDIC SURGERY | Facility: CLINIC | Age: 62
End: 2022-02-14

## 2022-02-14 VITALS — HEIGHT: 68 IN | BODY MASS INDEX: 29.1 KG/M2 | WEIGHT: 192 LBS

## 2022-02-14 DIAGNOSIS — Z96.651 S/P TKR (TOTAL KNEE REPLACEMENT), RIGHT: Primary | ICD-10-CM

## 2022-02-14 PROCEDURE — 99024 POSTOP FOLLOW-UP VISIT: CPT | Performed by: ORTHOPAEDIC SURGERY

## 2022-02-14 NOTE — PROGRESS NOTES
"Subjective:     Patient ID: Jennifer Arndt is a 61 y.o. female.    Chief Complaint:    History of Present Illness  Jennifer Arndt {presents/returns:[id number]} to clinic today for evaluation of ***     Social History     Occupational History     Employer: RETIRED     Comment: [employer]   Tobacco Use   • Smoking status: Former Smoker     Packs/day: 0.50     Years: 43.00     Pack years: 21.50     Types: Cigarettes     Quit date: 2017     Years since quittin.4   • Smokeless tobacco: Never Used   Vaping Use   • Vaping Use: Never used   Substance and Sexual Activity   • Alcohol use: No   • Drug use: No   • Sexual activity: Defer     Partners: Female      Past Medical History:   Diagnosis Date   • Anesthesia     PT STATES SENSITIVE TO AND B/P DROPS   • Arthritis     GENERALIZED   • Bruises easily    • CKD (chronic kidney disease)     Stage 3   • Depression    • Emphysema lung (HCC)     WAS TOLD SHE HAD THIS PER CT SCAN MAY 2018.... NO INHALERS   • Fibroids    • GERD (gastroesophageal reflux disease)    • H/O menorrhagia    • Hiatal hernia    • Hyperlipidemia    • Kidney calculi    • Kidney stone    • Recurrent UTI    • Right knee pain     \"BONE ON BONE\"   • Sjogren's disease (HCC)    • Spinal headache     WITH      Past Surgical History:   Procedure Laterality Date   • BREAST FIBROADENOMA SURGERY Left     4 cysts removed-all benign   • BREAST SURGERY     •  SECTION      x3   • CHOLECYSTECTOMY  2007    LAP   • COLONOSCOPY  2018   • CYSTOSCOPY N/A 2016    Procedure: CYSTOSCOPY;  Surgeon: Mikey Aguirre MD;  Location: [facility] OR;  Service:    • HYSTERECTOMY      Dr. Foster-menorrhagia   • KIDNEY STONE SURGERY     • TOTAL KNEE ARTHROPLASTY Right 2021    Procedure: TOTAL KNEE ARTHROPLASTY AND ALL ASSOCIATED PROCEDURES;  Surgeon: John Chan MD;  Location: [facility] OR;  Service: Orthopedics;  Laterality: Right;   • URETEROSCOPY LASER LITHOTRIPSY WITH STENT INSERTION Left " "12/2/2016    Procedure: lt URETEROSCOPY LASER LITHOTRIPSY WITH stone basket extraction w/STENT INSERTION;  Surgeon: Hasmukh Krueger MD;  Location: [facility] OR;  Service:    • URETEROSCOPY LASER LITHOTRIPSY WITH STENT INSERTION Left 3/1/2019    Procedure: LEFT URETEROSCOPY LASER LITHOTRIPSY STONE BASKET EXTRACTION STENT;  Surgeon: Hasmukh Krueger MD;  Location: [facility] OR;  Service: Urology   • URETEROSCOPY LASER LITHOTRIPSY WITH STENT INSERTION Left 3/15/2019    Procedure: LEFT URETEROSCOPY LASER LITHOTRIPSY WITH STENT INSERTION STONE BASKET EXTRACTION;  Surgeon: Hasmukh Krueger MD;  Location: [facility] OR;  Service: Urology   • WISDOM TOOTH EXTRACTION         Family History   Problem Relation Age of Onset   • Heart disease Mother    • Arthritis Mother    • Osteoporosis Mother    • Hypertension Mother    • Hyperlipidemia Mother    • Cancer Father    • Hodgkin's lymphoma Father    • Heart disease Maternal Grandmother    • Heart disease Maternal Grandfather    • Heart disease Paternal Grandmother    • Kidney disease Paternal Grandmother    • Heart disease Paternal Grandfather    • Kidney disease Paternal Grandfather    • Malig Hyperthermia Neg Hx          Review of Systems        Objective:  Vitals:    02/14/22 1136   Weight: 87.1 kg (192 lb)   Height: 172.7 cm (68\")         02/14/22  1136   Weight: 87.1 kg (192 lb)     Body mass index is 29.19 kg/m².  ***      Ortho Exam     ***  Imaging:  ***  Assessment:      No diagnosis found.       Plan:          1. Discussed treatment options at length with patient at today's visit. ***  2. Follow up: ***      Jennifer Arndt *** was in agreement with plan and had all questions answered.     Orders:  No orders of the defined types were placed in this encounter.      Medications:  No orders of the defined types were placed in this encounter.      Followup:  No follow-ups on file.    There are no diagnoses linked to this encounter.      Dictated utilizing Dragon " dictation

## 2022-02-14 NOTE — PROGRESS NOTES
CC: s/p right total knee arthroplasty, DOS 12/7/2021  Interval History: Jennifer Arndt returns for 10 week postoperative visit.    Patient states she is doing well overall, and notes improvement in her symptoms. She reports she is still attending physical therapy. The patient notes that she did better when she was on steroids since there was no swelling. Now that she is off, the swelling is coming back again. She is utilizing a knee sleeve when she is out exercising. She denies taking any pain medication for the last 3 weeks. The patient notes she is taking Tylenol for pain.    The patient is employed as a nurse. She would like to consider going back but work 8 hour shifts instead of 12 hours for a few days a week.     Physical Examination: Right knee was examined   Incision well healed   ROM 0 to 125 degrees, 4+/5 strength   Stable to varus and valgus stress   Flex/extend ankle and toes   Positive sensation right foot   No calf pain, negative Homans sign bilaterally    Assessment/Plan:  1.     Discussed treatment options at length with patient at today's visit.  2.     Overall, the patient is doing well. She is making good progress in regards to her motion, strength, and function of her knee. At this point in time, she would like to continue home exercises. We will allow her to return to work with 8-hour shifts for 2 to 3 shifts per week and see how she progresses.  3.     I will follow up with her in 4 months with repeat x-rays of right knee at that time.     Medications:  No orders of the defined types were placed in this encounter.      John Chan MD    Transcribed from ambient dictation for John Chan MD by Michela Abraham.  02/15/22   11:07 EST    Patient verbalized consent to the visit recording.  I have personally performed the services described in this document as transcribed by the above individual, and it is both accurate and complete.  John Chan MD  2/24/2022  22:33  EST

## 2022-02-15 ENCOUNTER — HOSPITAL ENCOUNTER (OUTPATIENT)
Dept: PHYSICAL THERAPY | Facility: HOSPITAL | Age: 62
Setting detail: THERAPIES SERIES
Discharge: HOME | End: 2022-02-15

## 2022-02-15 DIAGNOSIS — Z96.651 STATUS POST TOTAL KNEE REPLACEMENT, RIGHT: Primary | ICD-10-CM

## 2022-02-15 PROCEDURE — 97140 MANUAL THERAPY 1/> REGIONS: CPT

## 2022-02-15 PROCEDURE — 97110 THERAPEUTIC EXERCISES: CPT

## 2022-02-15 NOTE — THERAPY TREATMENT NOTE
"    Outpatient Physical Therapy Ortho Treatment Note   Sandra Wiseman     Patient Name: Jennifer Arndt  : 1960  MRN: [id number]  Today's Date: 2/15/2022      Visit Date: 02/15/2022    Visit Dx:    ICD-10-CM ICD-9-CM   1. Status post total knee replacement, right  Z96.651 V43.65       Patient Active Problem List   Diagnosis   • Acute pyelonephritis   • Left ureteral stone   • Abnormal serum total protein level   • Primary osteoarthritis of right knee   • Pain   • S/P TKR (total knee replacement), right, DOS         Past Medical History:   Diagnosis Date   • Anesthesia     PT STATES SENSITIVE TO AND B/P DROPS   • Arthritis     GENERALIZED   • Bruises easily    • CKD (chronic kidney disease)     Stage 3   • Depression    • Emphysema lung (HCC)     WAS TOLD SHE HAD THIS PER CT SCAN MAY 2018.... NO INHALERS   • Fibroids    • GERD (gastroesophageal reflux disease)    • H/O menorrhagia    • Hiatal hernia    • Hyperlipidemia    • Kidney calculi    • Kidney stone    • Recurrent UTI    • Right knee pain     \"BONE ON BONE\"   • Sjogren's disease (HCC)    • Spinal headache     WITH         Past Surgical History:   Procedure Laterality Date   • BREAST FIBROADENOMA SURGERY Left     4 cysts removed-all benign   • BREAST SURGERY     •  SECTION      x3   • CHOLECYSTECTOMY      LAP   • COLONOSCOPY  2018   • CYSTOSCOPY N/A 2016    Procedure: CYSTOSCOPY;  Surgeon: Mikey Aguirre MD;  Location: [facility];  Service:    • HYSTERECTOMY      Dr. Foster-menorrhagia   • KIDNEY STONE SURGERY     • TOTAL KNEE ARTHROPLASTY Right 2021    Procedure: TOTAL KNEE ARTHROPLASTY AND ALL ASSOCIATED PROCEDURES;  Surgeon: John Chan MD;  Location: [facility];  Service: Orthopedics;  Laterality: Right;   • URETEROSCOPY LASER LITHOTRIPSY WITH STENT INSERTION Left 2016    Procedure: lt URETEROSCOPY LASER LITHOTRIPSY WITH stone basket extraction w/STENT INSERTION;  Surgeon: Hasmukh Krueger MD;  " Location: [facility] OR;  Service:    • URETEROSCOPY LASER LITHOTRIPSY WITH STENT INSERTION Left 3/1/2019    Procedure: LEFT URETEROSCOPY LASER LITHOTRIPSY STONE BASKET EXTRACTION STENT;  Surgeon: Hasmukh Krueger MD;  Location: [facility] OR;  Service: Urology   • URETEROSCOPY LASER LITHOTRIPSY WITH STENT INSERTION Left 3/15/2019    Procedure: LEFT URETEROSCOPY LASER LITHOTRIPSY WITH STENT INSERTION STONE BASKET EXTRACTION;  Surgeon: Hasmukh Krueger MD;  Location: [facility] OR;  Service: Urology   • WISDOM TOOTH EXTRACTION                          PT Assessment/Plan     Row Name 02/15/22 1010          PT Assessment    Assessment Comments Pt has made good progress toward goals; pt demonstrates improved gait and overall strength and function.  -KM            PT Plan    PT Plan Comments With pts improved status, plan to transition to HEP.  -KM           User Key  (r) = Recorded By, (t) = Taken By, (c) = Cosigned By    Initials Name Provider Type    Jasmyne Carrera PTA Physical Therapy Assistant                   OP Exercises     Row Name 02/15/22 1010             Subjective Comments    Subjective Comments Pt states her f/u appointment went well and MD was pleased with her progress. States today can be her last visit and to continue with HEP  -KM              Exercise 1    Exercise Name 1 Heel Slides  -KM      Time 1 5 min  -KM              Exercise 2    Exercise Name 2 Wall Slides  -KM      Time 2 5 min  -KM              Exercise 3    Exercise Name 3 Airdyne - seat 3  -KM      Cueing 3 Verbal  -KM      Time 3 5 min  -KM              Exercise 4    Exercise Name 4 Manual Flexion  -KM      Reps 4 10  -KM      Time 4 10 sec hold each  -KM              Exercise 5    Exercise Name 5 HS Stretch  -KM      Reps 5 10  -KM      Time 5 10 sec hold each  -KM              Exercise 6    Exercise Name 6 Heel Prop  -KM      Time 6 5 min- 5#  -KM      Additional Comments PLH x 5 min 5#  -KM              Exercise 7     "Exercise Name 7 Manual Extension  -KM      Reps 7 10  -KM      Time 7 10 sec hold each  -KM              Exercise 8    Exercise Name 8 QS (ankle)  -KM      Reps 8 25  -KM      Time 8 5 sec hold each  -KM              Exercise 9    Exercise Name 9 SAQ  -KM              Exercise 10    Exercise Name 10 3-Way Hip  -KM      Reps 10 30 each  -KM      Time 10 2#  -KM              Exercise 11    Exercise Name 11 LAQ  -KM      Reps 11 30  -KM      Time 11 2#  -KM              Exercise 12    Exercise Name 12 TKE  -KM      Reps 12 25  -KM      Time 12 Gold  -KM              Exercise 13    Exercise Name 13 Heel Raises  -KM      Reps 13 25  -KM              Exercise 14    Exercise Name 14 Mini Squats  -KM      Reps 14 25  -KM              Exercise 15    Exercise Name 15 6\" Fwd Step Ups  -KM      Reps 15 25  -KM              Exercise 16    Exercise Name 16 6\" Lat Step Overs  -KM      Reps 16 25  -KM            User Key  (r) = Recorded By, (t) = Taken By, (c) = Cosigned By    Initials Name Provider Type    Jasmyne Carrera PTA Physical Therapy Assistant                                                Time Calculation:   Start Time: 1010  Stop Time: 1058  Time Calculation (min): 48 min  Therapy Charges for Today     Code Description Service Date Service Provider Modifiers Qty    36545750928 HC PT MANUAL THERAPY EA 15 MIN 2/15/2022 Jasmyne Aguila PTA GP 1    59487364828 HC PT THER PROC EA 15 MIN 2/15/2022 Jasmyne Aguila PTA GP 1                    Jasmyne Aguila PTA  2/15/2022     "

## 2022-08-08 ENCOUNTER — OFFICE VISIT (OUTPATIENT)
Dept: ORTHOPEDIC SURGERY | Facility: CLINIC | Age: 62
End: 2022-08-08

## 2022-08-08 VITALS — BODY MASS INDEX: 29.1 KG/M2 | WEIGHT: 192 LBS | HEIGHT: 68 IN

## 2022-08-08 DIAGNOSIS — M17.11 PRIMARY OSTEOARTHRITIS OF RIGHT KNEE: Primary | ICD-10-CM

## 2022-08-08 DIAGNOSIS — Z96.651 S/P TKR (TOTAL KNEE REPLACEMENT), RIGHT: ICD-10-CM

## 2022-08-08 PROCEDURE — 73562 X-RAY EXAM OF KNEE 3: CPT | Performed by: ORTHOPAEDIC SURGERY

## 2022-08-08 PROCEDURE — 99212 OFFICE O/P EST SF 10 MIN: CPT | Performed by: ORTHOPAEDIC SURGERY

## 2022-08-08 NOTE — PROGRESS NOTES
"Subjective:     Patient ID: Jennifer Arndt is a 62 y.o. female.    Chief Complaint:  Status post right total knee arthroplasty-2021    History of Present Illness  Jennifer Arndt returns to clinic today for evaluation of ight knee pain. She is 8 months status post right total knee arthroplasty. She states overall she is doing fairly well. She does get a little bit of irritation and swelling after working 12-hour shifts, but otherwise feels like she is doing fairly well at this point in time. She denies any numbness or tingling or any giving way episodes.     Social History     Occupational History     Employer: RETIRED     Comment: [employer]   Tobacco Use   • Smoking status: Former Smoker     Packs/day: 0.50     Years: 43.00     Pack years: 21.50     Types: Cigarettes     Quit date: 2017     Years since quittin.9   • Smokeless tobacco: Never Used   Vaping Use   • Vaping Use: Never used   Substance and Sexual Activity   • Alcohol use: No   • Drug use: No   • Sexual activity: Defer     Partners: Female      Past Medical History:   Diagnosis Date   • Anesthesia     PT STATES SENSITIVE TO AND B/P DROPS   • Arthritis     GENERALIZED   • Bruises easily    • CKD (chronic kidney disease)     Stage 3   • Depression    • Emphysema lung (HCC)     WAS TOLD SHE HAD THIS PER CT SCAN MAY 2018.... NO INHALERS   • Fibroids    • GERD (gastroesophageal reflux disease)    • H/O menorrhagia    • Hiatal hernia    • Hyperlipidemia    • Kidney calculi    • Kidney stone    • Recurrent UTI    • Right knee pain     \"BONE ON BONE\"   • Sjogren's disease (HCC)    • Spinal headache     WITH      Past Surgical History:   Procedure Laterality Date   • BREAST FIBROADENOMA SURGERY Left     4 cysts removed-all benign   • BREAST SURGERY     •  SECTION      x3   • CHOLECYSTECTOMY  2007    LAP   • COLONOSCOPY  2018   • CYSTOSCOPY N/A 2016    Procedure: CYSTOSCOPY;  Surgeon: Mikey Aguirre MD;  " Location: [facility] OR;  Service:    • HYSTERECTOMY  2002    Dr. Foster-menorrhagia   • KIDNEY STONE SURGERY     • TOTAL KNEE ARTHROPLASTY Right 12/7/2021    Procedure: TOTAL KNEE ARTHROPLASTY AND ALL ASSOCIATED PROCEDURES;  Surgeon: John Chan MD;  Location: [facility] OR;  Service: Orthopedics;  Laterality: Right;   • URETEROSCOPY LASER LITHOTRIPSY WITH STENT INSERTION Left 12/2/2016    Procedure: lt URETEROSCOPY LASER LITHOTRIPSY WITH stone basket extraction w/STENT INSERTION;  Surgeon: Hamsukh Krueger MD;  Location: [facility] MAIN OR;  Service:    • URETEROSCOPY LASER LITHOTRIPSY WITH STENT INSERTION Left 3/1/2019    Procedure: LEFT URETEROSCOPY LASER LITHOTRIPSY STONE BASKET EXTRACTION STENT;  Surgeon: Hasmukh Krueger MD;  Location: [facility] MAIN OR;  Service: Urology   • URETEROSCOPY LASER LITHOTRIPSY WITH STENT INSERTION Left 3/15/2019    Procedure: LEFT URETEROSCOPY LASER LITHOTRIPSY WITH STENT INSERTION STONE BASKET EXTRACTION;  Surgeon: Hasmukh Krueger MD;  Location: [facility] MAIN OR;  Service: Urology   • WISDOM TOOTH EXTRACTION         Family History   Problem Relation Age of Onset   • Heart disease Mother    • Arthritis Mother    • Osteoporosis Mother    • Hypertension Mother    • Hyperlipidemia Mother    • Cancer Father    • Hodgkin's lymphoma Father    • Heart disease Maternal Grandmother    • Heart disease Maternal Grandfather    • Heart disease Paternal Grandmother    • Kidney disease Paternal Grandmother    • Heart disease Paternal Grandfather    • Kidney disease Paternal Grandfather    • Malig Hyperthermia Neg Hx          Review of Systems   Constitutional: Negative for chills, diaphoresis, fever and unexpected weight change.   HENT: Negative for hearing loss, nosebleeds, sneezing and sore throat.    Eyes: Negative for pain and visual disturbance.   Respiratory: Negative for cough, shortness of breath and wheezing.    Cardiovascular: Negative for chest pain and palpitations.   Gastrointestinal:  "Negative for abdominal pain, diarrhea, nausea and vomiting.   Endocrine: Negative for cold intolerance, heat intolerance and polydipsia.   Genitourinary: Negative for difficulty urinating, dyspareunia and hematuria.   Musculoskeletal: Positive for arthralgias and myalgias. Negative for joint swelling.   Skin: Negative for rash and wound.   Allergic/Immunologic: Negative for environmental allergies.   Neurological: Negative for dizziness, syncope and numbness.   Hematological: Does not bruise/bleed easily.   Psychiatric/Behavioral: Negative for dysphoric mood and sleep disturbance. The patient is not nervous/anxious.            Objective:  Vitals:    08/08/22 1124   Weight: 87.1 kg (192 lb)   Height: 172.7 cm (68\")         08/08/22  1124   Weight: 87.1 kg (192 lb)     Body mass index is 29.19 kg/m².  General: No acute distress.  Resp: normal respiratory effort  Skin: no rashes or wounds; normal turgor  Psych: mood and affect appropriate; recent and remote memory intact          Ortho Exam     Right Knee-  Midline incision well healed.  ROM 0 to 125 degrees  4+/5 on flexion  4+/5 on extension  Effusion- None noted  Posterior drawer- Good endpoint at 90 degrees  Stable to varus and valgus stress at 0 and 30    Imaging:  Right Knee X-Ray  Indication: s/p total knee     AP, Lateral, and Redfield views    Findings:  Total knee arthroplasty components are in stable position with acceptable overall alignment, no evidence of periprosthetic fracture, loosening, osteolysis, or reactive bone formation.    Compared to prior postoperative x-rays    Assessment:        1. Primary osteoarthritis of right knee    2. S/P TKR (total knee replacement), right, DOS            Plan:          1. Discussed treatment options at length with patient at today's visit.  2. The patient is doing very well at this point in time following her knee replacement. I recommend she continue working on home exercises for strength and range of motion. " Consider use of knee sleeve, anti-inflammatories, ice, and motion exercises to try to help accommodate for her rebound swelling from her long shifts at work.  3. Follow up:  Since she is doing well at this point, we will cancel her appointment in 11/2022 and plan for a follow-up 1 year from now with repeat x-rays.      Jennifer Arndt was in agreement with plan and had all questions answered.     Orders:  Orders Placed This Encounter   Procedures   • XR Knee 3 View Right       Medications:  No orders of the defined types were placed in this encounter.      Followup:  Return in about 1 year (around 8/8/2023) for xrays needed at follow up.    Diagnoses and all orders for this visit:    1. Primary osteoarthritis of right knee (Primary)    2. S/P TKR (total knee replacement), right, DOS   -     XR Knee 3 View Right          Dictated utilizing Dragon dictation     Transcribed from ambient dictation for John Chan MD by Ayala Pena.  08/09/22   04:37 EDT    Patient verbalized consent to the visit recording.  I have personally performed the services described in this document as transcribed by the above individual, and it is both accurate and complete.  John Chan MD  8/16/2022  10:45 EDT

## 2023-01-04 ENCOUNTER — LAB (OUTPATIENT)
Dept: LAB | Facility: HOSPITAL | Age: 63
End: 2023-01-04
Payer: COMMERCIAL

## 2023-01-04 ENCOUNTER — TRANSCRIBE ORDERS (OUTPATIENT)
Dept: ADMINISTRATIVE | Facility: HOSPITAL | Age: 63
End: 2023-01-04
Payer: COMMERCIAL

## 2023-01-04 DIAGNOSIS — N18.31 CHRONIC KIDNEY DISEASE (CKD) STAGE G3A/A1, MODERATELY DECREASED GLOMERULAR FILTRATION RATE (GFR) BETWEEN 45-59 ML/MIN/1.73 SQUARE METER AND ALBUMINURIA CREATININE RATIO LESS THAN 30 MG/G (CMS/H*: Primary | ICD-10-CM

## 2023-01-04 DIAGNOSIS — N18.31 CHRONIC KIDNEY DISEASE (CKD) STAGE G3A/A1, MODERATELY DECREASED GLOMERULAR FILTRATION RATE (GFR) BETWEEN 45-59 ML/MIN/1.73 SQUARE METER AND ALBUMINURIA CREATININE RATIO LESS THAN 30 MG/G (CMS/H*: ICD-10-CM

## 2023-01-04 LAB
ANION GAP SERPL CALCULATED.3IONS-SCNC: 8.3 MMOL/L (ref 5–15)
BACTERIA UR QL AUTO: ABNORMAL /HPF
BASOPHILS # BLD AUTO: 0.06 10*3/MM3 (ref 0–0.2)
BASOPHILS NFR BLD AUTO: 0.8 % (ref 0–1.5)
BILIRUB UR QL STRIP: NEGATIVE
BUN SERPL-MCNC: 19 MG/DL (ref 8–23)
BUN/CREAT SERPL: 17.3 (ref 7–25)
CALCIUM SPEC-SCNC: 9.5 MG/DL (ref 8.6–10.5)
CHLORIDE SERPL-SCNC: 111 MMOL/L (ref 98–107)
CLARITY UR: ABNORMAL
CO2 SERPL-SCNC: 18.7 MMOL/L (ref 22–29)
COLOR UR: YELLOW
CREAT SERPL-MCNC: 1.1 MG/DL (ref 0.57–1)
DEPRECATED RDW RBC AUTO: 43.6 FL (ref 37–54)
EGFRCR SERPLBLD CKD-EPI 2021: 56.2 ML/MIN/1.73
EOSINOPHIL # BLD AUTO: 0.16 10*3/MM3 (ref 0–0.4)
EOSINOPHIL NFR BLD AUTO: 2.1 % (ref 0.3–6.2)
ERYTHROCYTE [DISTWIDTH] IN BLOOD BY AUTOMATED COUNT: 12.7 % (ref 12.3–15.4)
GLUCOSE SERPL-MCNC: 82 MG/DL (ref 65–99)
GLUCOSE UR STRIP-MCNC: NEGATIVE MG/DL
HCT VFR BLD AUTO: 40.9 % (ref 34–46.6)
HGB BLD-MCNC: 13.5 G/DL (ref 12–15.9)
HGB UR QL STRIP.AUTO: ABNORMAL
HYALINE CASTS UR QL AUTO: ABNORMAL /LPF
IMM GRANULOCYTES # BLD AUTO: 0.02 10*3/MM3 (ref 0–0.05)
IMM GRANULOCYTES NFR BLD AUTO: 0.3 % (ref 0–0.5)
KETONES UR QL STRIP: NEGATIVE
LEUKOCYTE ESTERASE UR QL STRIP.AUTO: ABNORMAL
LYMPHOCYTES # BLD AUTO: 1.78 10*3/MM3 (ref 0.7–3.1)
LYMPHOCYTES NFR BLD AUTO: 23.9 % (ref 19.6–45.3)
MCH RBC QN AUTO: 30.8 PG (ref 26.6–33)
MCHC RBC AUTO-ENTMCNC: 33 G/DL (ref 31.5–35.7)
MCV RBC AUTO: 93.2 FL (ref 79–97)
MONOCYTES # BLD AUTO: 0.87 10*3/MM3 (ref 0.1–0.9)
MONOCYTES NFR BLD AUTO: 11.7 % (ref 5–12)
NEUTROPHILS NFR BLD AUTO: 4.56 10*3/MM3 (ref 1.7–7)
NEUTROPHILS NFR BLD AUTO: 61.2 % (ref 42.7–76)
NITRITE UR QL STRIP: NEGATIVE
NRBC BLD AUTO-RTO: 0 /100 WBC (ref 0–0.2)
PH UR STRIP.AUTO: 7 [PH] (ref 5–8)
PLATELET # BLD AUTO: 249 10*3/MM3 (ref 140–450)
PMV BLD AUTO: 11.1 FL (ref 6–12)
POTASSIUM SERPL-SCNC: 3.8 MMOL/L (ref 3.5–5.2)
PROT UR QL STRIP: ABNORMAL
RBC # BLD AUTO: 4.39 10*6/MM3 (ref 3.77–5.28)
RBC # UR STRIP: ABNORMAL /HPF
REF LAB TEST METHOD: ABNORMAL
SODIUM SERPL-SCNC: 138 MMOL/L (ref 136–145)
SP GR UR STRIP: 1.01 (ref 1–1.03)
SQUAMOUS #/AREA URNS HPF: ABNORMAL /HPF
UROBILINOGEN UR QL STRIP: ABNORMAL
WBC # UR STRIP: ABNORMAL /HPF
WBC NRBC COR # BLD: 7.45 10*3/MM3 (ref 3.4–10.8)

## 2023-01-04 PROCEDURE — 36415 COLL VENOUS BLD VENIPUNCTURE: CPT

## 2023-01-04 PROCEDURE — 80048 BASIC METABOLIC PNL TOTAL CA: CPT

## 2023-01-04 PROCEDURE — 85025 COMPLETE CBC W/AUTO DIFF WBC: CPT

## 2023-01-04 PROCEDURE — 81001 URINALYSIS AUTO W/SCOPE: CPT

## 2023-01-09 ENCOUNTER — TRANSCRIBE ORDERS (OUTPATIENT)
Dept: ADMINISTRATIVE | Facility: HOSPITAL | Age: 63
End: 2023-01-09
Payer: COMMERCIAL

## 2023-01-09 DIAGNOSIS — N18.31 CHRONIC KIDNEY DISEASE (CKD) STAGE G3A/A1, MODERATELY DECREASED GLOMERULAR FILTRATION RATE (GFR) BETWEEN 45-59 ML/MIN/1.73 SQUARE METER AND ALBUMINURIA CREATININE RATIO LESS THAN 30 MG/G (CMS/H*: Primary | ICD-10-CM

## 2023-05-14 ENCOUNTER — HOSPITAL ENCOUNTER (EMERGENCY)
Facility: HOSPITAL | Age: 63
Discharge: HOME | End: 2023-05-14
Attending: EMERGENCY MEDICINE | Admitting: EMERGENCY MEDICINE
Payer: COMMERCIAL

## 2023-05-14 ENCOUNTER — APPOINTMENT (OUTPATIENT)
Dept: CT IMAGING | Facility: HOSPITAL | Age: 63
End: 2023-05-14
Payer: COMMERCIAL

## 2023-05-14 VITALS
HEART RATE: 87 BPM | RESPIRATION RATE: 18 BRPM | SYSTOLIC BLOOD PRESSURE: 93 MMHG | HEIGHT: 68 IN | DIASTOLIC BLOOD PRESSURE: 58 MMHG | OXYGEN SATURATION: 96 % | BODY MASS INDEX: 27.58 KG/M2 | TEMPERATURE: 97.7 F | WEIGHT: 182 LBS

## 2023-05-14 DIAGNOSIS — R10.9 FLANK PAIN: ICD-10-CM

## 2023-05-14 DIAGNOSIS — N20.1 URETEROLITHIASIS: Primary | ICD-10-CM

## 2023-05-14 DIAGNOSIS — N30.01 ACUTE CYSTITIS WITH HEMATURIA: ICD-10-CM

## 2023-05-14 LAB
ALBUMIN SERPL-MCNC: 3.9 G/DL (ref 3.5–5.2)
ALBUMIN/GLOB SERPL: 0.8 G/DL
ALP SERPL-CCNC: 129 U/L (ref 39–117)
ALT SERPL W P-5'-P-CCNC: 55 U/L (ref 1–33)
ANION GAP SERPL CALCULATED.3IONS-SCNC: 12.6 MMOL/L (ref 5–15)
AST SERPL-CCNC: 53 U/L (ref 1–32)
BACTERIA UR QL AUTO: ABNORMAL /HPF
BASOPHILS # BLD AUTO: 0.03 10*3/MM3 (ref 0–0.2)
BASOPHILS NFR BLD AUTO: 0.2 % (ref 0–1.5)
BILIRUB SERPL-MCNC: 0.6 MG/DL (ref 0–1.2)
BILIRUB UR QL STRIP: NEGATIVE
BUN SERPL-MCNC: 20 MG/DL (ref 8–23)
BUN/CREAT SERPL: 14.6 (ref 7–25)
CALCIUM SPEC-SCNC: 9.6 MG/DL (ref 8.6–10.5)
CHLORIDE SERPL-SCNC: 108 MMOL/L (ref 98–107)
CLARITY UR: CLEAR
CO2 SERPL-SCNC: 16.4 MMOL/L (ref 22–29)
COLOR UR: YELLOW
CREAT SERPL-MCNC: 1.37 MG/DL (ref 0.57–1)
D-LACTATE SERPL-SCNC: 1.1 MMOL/L (ref 0.5–2)
DEPRECATED RDW RBC AUTO: 52.5 FL (ref 37–54)
EGFRCR SERPLBLD CKD-EPI 2021: 43.2 ML/MIN/1.73
EOSINOPHIL # BLD AUTO: 0.04 10*3/MM3 (ref 0–0.4)
EOSINOPHIL NFR BLD AUTO: 0.3 % (ref 0.3–6.2)
ERYTHROCYTE [DISTWIDTH] IN BLOOD BY AUTOMATED COUNT: 14.6 % (ref 12.3–15.4)
GLOBULIN UR ELPH-MCNC: 5.1 GM/DL
GLUCOSE SERPL-MCNC: 137 MG/DL (ref 65–99)
GLUCOSE UR STRIP-MCNC: NEGATIVE MG/DL
HCT VFR BLD AUTO: 42.8 % (ref 34–46.6)
HGB BLD-MCNC: 13.7 G/DL (ref 12–15.9)
HGB UR QL STRIP.AUTO: ABNORMAL
HOLD SPECIMEN: NORMAL
HOLD SPECIMEN: NORMAL
HYALINE CASTS UR QL AUTO: ABNORMAL /LPF
IMM GRANULOCYTES # BLD AUTO: 0.02 10*3/MM3 (ref 0–0.05)
IMM GRANULOCYTES NFR BLD AUTO: 0.2 % (ref 0–0.5)
KETONES UR QL STRIP: NEGATIVE
LEUKOCYTE ESTERASE UR QL STRIP.AUTO: ABNORMAL
LIPASE SERPL-CCNC: 42 U/L (ref 13–60)
LYMPHOCYTES # BLD AUTO: 0.85 10*3/MM3 (ref 0.7–3.1)
LYMPHOCYTES NFR BLD AUTO: 6.5 % (ref 19.6–45.3)
MCH RBC QN AUTO: 31.1 PG (ref 26.6–33)
MCHC RBC AUTO-ENTMCNC: 32 G/DL (ref 31.5–35.7)
MCV RBC AUTO: 97.3 FL (ref 79–97)
MONOCYTES # BLD AUTO: 0.99 10*3/MM3 (ref 0.1–0.9)
MONOCYTES NFR BLD AUTO: 7.6 % (ref 5–12)
NEUTROPHILS NFR BLD AUTO: 11.1 10*3/MM3 (ref 1.7–7)
NEUTROPHILS NFR BLD AUTO: 85.2 % (ref 42.7–76)
NITRITE UR QL STRIP: NEGATIVE
NRBC BLD AUTO-RTO: 0 /100 WBC (ref 0–0.2)
PH UR STRIP.AUTO: 7 [PH] (ref 4.5–8)
PLATELET # BLD AUTO: 223 10*3/MM3 (ref 140–450)
PMV BLD AUTO: 10.1 FL (ref 6–12)
POTASSIUM SERPL-SCNC: 3.8 MMOL/L (ref 3.5–5.2)
PROT SERPL-MCNC: 9 G/DL (ref 6–8.5)
PROT UR QL STRIP: ABNORMAL
RBC # BLD AUTO: 4.4 10*6/MM3 (ref 3.77–5.28)
RBC # UR STRIP: ABNORMAL /HPF
REF LAB TEST METHOD: ABNORMAL
SODIUM SERPL-SCNC: 137 MMOL/L (ref 136–145)
SP GR UR STRIP: 1.01 (ref 1–1.03)
SQUAMOUS #/AREA URNS HPF: ABNORMAL /HPF
UROBILINOGEN UR QL STRIP: ABNORMAL
WBC # UR STRIP: ABNORMAL /HPF
WBC NRBC COR # BLD: 13.03 10*3/MM3 (ref 3.4–10.8)
WHOLE BLOOD HOLD COAG: NORMAL
WHOLE BLOOD HOLD SPECIMEN: NORMAL

## 2023-05-14 PROCEDURE — 99283 EMERGENCY DEPT VISIT LOW MDM: CPT

## 2023-05-14 PROCEDURE — 25010000002 LORAZEPAM PER 2 MG: Performed by: EMERGENCY MEDICINE

## 2023-05-14 PROCEDURE — 83605 ASSAY OF LACTIC ACID: CPT | Performed by: EMERGENCY MEDICINE

## 2023-05-14 PROCEDURE — 81001 URINALYSIS AUTO W/SCOPE: CPT | Performed by: EMERGENCY MEDICINE

## 2023-05-14 PROCEDURE — 83690 ASSAY OF LIPASE: CPT | Performed by: EMERGENCY MEDICINE

## 2023-05-14 PROCEDURE — 85025 COMPLETE CBC W/AUTO DIFF WBC: CPT | Performed by: EMERGENCY MEDICINE

## 2023-05-14 PROCEDURE — 25010000002 METHYLPREDNISOLONE PER 125 MG: Performed by: EMERGENCY MEDICINE

## 2023-05-14 PROCEDURE — 80053 COMPREHEN METABOLIC PANEL: CPT | Performed by: EMERGENCY MEDICINE

## 2023-05-14 PROCEDURE — 96375 TX/PRO/DX INJ NEW DRUG ADDON: CPT

## 2023-05-14 PROCEDURE — 96374 THER/PROPH/DIAG INJ IV PUSH: CPT

## 2023-05-14 PROCEDURE — 74176 CT ABD & PELVIS W/O CONTRAST: CPT

## 2023-05-14 RX ORDER — CEPHALEXIN 500 MG/1
1000 CAPSULE ORAL ONCE
Status: COMPLETED | OUTPATIENT
Start: 2023-05-14 | End: 2023-05-14

## 2023-05-14 RX ORDER — HYDROCODONE BITARTRATE AND ACETAMINOPHEN 5; 325 MG/1; MG/1
2 TABLET ORAL ONCE
Status: COMPLETED | OUTPATIENT
Start: 2023-05-14 | End: 2023-05-14

## 2023-05-14 RX ORDER — METHYLPREDNISOLONE SODIUM SUCCINATE 125 MG/2ML
125 INJECTION, POWDER, LYOPHILIZED, FOR SOLUTION INTRAMUSCULAR; INTRAVENOUS ONCE
Status: COMPLETED | OUTPATIENT
Start: 2023-05-14 | End: 2023-05-14

## 2023-05-14 RX ORDER — LORAZEPAM 2 MG/ML
0.5 INJECTION INTRAMUSCULAR ONCE
Status: COMPLETED | OUTPATIENT
Start: 2023-05-14 | End: 2023-05-14

## 2023-05-14 RX ORDER — TAMSULOSIN HYDROCHLORIDE 0.4 MG/1
1 CAPSULE ORAL DAILY
Qty: 14 CAPSULE | Refills: 0 | Status: SHIPPED | OUTPATIENT
Start: 2023-05-14

## 2023-05-14 RX ORDER — SODIUM CHLORIDE 0.9 % (FLUSH) 0.9 %
10 SYRINGE (ML) INJECTION AS NEEDED
Status: DISCONTINUED | OUTPATIENT
Start: 2023-05-14 | End: 2023-05-14 | Stop reason: HOSPADM

## 2023-05-14 RX ORDER — HYDROCODONE BITARTRATE AND ACETAMINOPHEN 7.5; 325 MG/1; MG/1
1 TABLET ORAL EVERY 6 HOURS PRN
Qty: 12 TABLET | Refills: 0 | Status: SHIPPED | OUTPATIENT
Start: 2023-05-14

## 2023-05-14 RX ORDER — CEPHALEXIN 500 MG/1
1000 CAPSULE ORAL EVERY 12 HOURS
Qty: 40 CAPSULE | Refills: 0 | Status: SHIPPED | OUTPATIENT
Start: 2023-05-14 | End: 2023-05-24

## 2023-05-14 RX ADMIN — CEPHALEXIN 1000 MG: 500 CAPSULE ORAL at 21:33

## 2023-05-14 RX ADMIN — HYDROCODONE BITARTRATE AND ACETAMINOPHEN 2 TABLET: 5; 325 TABLET ORAL at 21:45

## 2023-05-14 RX ADMIN — METHYLPREDNISOLONE SODIUM SUCCINATE 125 MG: 125 INJECTION INTRAMUSCULAR; INTRAVENOUS at 18:27

## 2023-05-14 RX ADMIN — LORAZEPAM 0.5 MG: 2 INJECTION INTRAMUSCULAR; INTRAVENOUS at 18:28

## 2023-05-14 NOTE — ED TRIAGE NOTES
Pt to ED via PV. Pt c/o left flank pain that started yesterday. Pt also states nausea. Denies urinary symptoms. Pt hx kidney stones. Urologist MD Evans

## 2023-05-14 NOTE — Clinical Note
[place]  [facility] EMERGENCY DEPARTMENT  [address]  [place] [address]  Phone: [phone]    Jennifer Arndt was seen and treated in our emergency department on 5/14/2023.  She may return to work on 05/18/2023.         Thank you for choosing [facility].    Rico Howard MD

## 2023-05-14 NOTE — Clinical Note
[place]  [facility] EMERGENCY DEPARTMENT  [address]  [place] [address]  Phone: [phone]    Jennifer Arndt was seen and treated in our emergency department on 5/14/2023.  She may return to work on 05/18/2023.         Thank you for choosing [facility].    Rcio Howard MD

## 2023-05-14 NOTE — ED PROVIDER NOTES
"Subjective   History of Present Illness  Patient presents to the ER complaining of left flank pain that started 1 day ago.  Patient says she has had nausea but no vomiting or diarrhea.  Patient is alert tolerate p.o. food and fluids.  He has no appetite.  Patient has a long history of kidney stones.  Patient denies any recent travel, sick contacts, bad food exposure, or trauma.  No therapy taken prior to arrival.        Review of Systems   All other systems reviewed and are negative.      Past Medical History:   Diagnosis Date   • Anesthesia     PT STATES SENSITIVE TO AND B/P DROPS   • Arthritis     GENERALIZED   • Bruises easily    • CKD (chronic kidney disease)     Stage 3   • Depression    • Emphysema lung     WAS TOLD SHE HAD THIS PER CT SCAN MAY 2018.... NO INHALERS   • Fibroids    • GERD (gastroesophageal reflux disease)    • H/O menorrhagia    • Hiatal hernia    • Hyperlipidemia    • Kidney calculi    • Kidney stone    • Recurrent UTI    • Right knee pain     \"BONE ON BONE\"   • Sjogren's disease    • Spinal headache     WITH        Allergies   Allergen Reactions   • Morphine And Related Anaphylaxis     FELT LIKE THROAT SWELLED, SOME SWALLOWING TROUBLE       • Nickel Swelling       Past Surgical History:   Procedure Laterality Date   • BREAST FIBROADENOMA SURGERY Left     4 cysts removed-all benign   • BREAST SURGERY     •  SECTION      x3   • CHOLECYSTECTOMY  2007    LAP   • COLONOSCOPY  2018   • CYSTOSCOPY N/A 2016    Procedure: CYSTOSCOPY;  Surgeon: Mikey Aguirre MD;  Location[facility] OR;  Service:    • HYSTERECTOMY      Dr. Foster-menorrhagia   • KIDNEY STONE SURGERY     • TOTAL KNEE ARTHROPLASTY Right 2021    Procedure: TOTAL KNEE ARTHROPLASTY AND ALL ASSOCIATED PROCEDURES;  Surgeon: John Chan MD;  Location: [facility];  Service: Orthopedics;  Laterality: Right;   • URETEROSCOPY LASER LITHOTRIPSY WITH STENT INSERTION Left 2016    Procedure: lt " URETEROSCOPY LASER LITHOTRIPSY WITH stone basket extraction w/STENT INSERTION;  Surgeon: Hasmukh Krueger MD;  Location: [facility] MAIN OR;  Service:    • URETEROSCOPY LASER LITHOTRIPSY WITH STENT INSERTION Left 3/1/2019    Procedure: LEFT URETEROSCOPY LASER LITHOTRIPSY STONE BASKET EXTRACTION STENT;  Surgeon: Hasmukh Krueger MD;  Location: [facility] MAIN OR;  Service: Urology   • URETEROSCOPY LASER LITHOTRIPSY WITH STENT INSERTION Left 3/15/2019    Procedure: LEFT URETEROSCOPY LASER LITHOTRIPSY WITH STENT INSERTION STONE BASKET EXTRACTION;  Surgeon: Hasmukh Krueger MD;  Location: [facility] MAIN OR;  Service: Urology   • WISDOM TOOTH EXTRACTION         Family History   Problem Relation Age of Onset   • Heart disease Mother    • Arthritis Mother    • Osteoporosis Mother    • Hypertension Mother    • Hyperlipidemia Mother    • Cancer Father    • Hodgkin's lymphoma Father    • Heart disease Maternal Grandmother    • Heart disease Maternal Grandfather    • Heart disease Paternal Grandmother    • Kidney disease Paternal Grandmother    • Heart disease Paternal Grandfather    • Kidney disease Paternal Grandfather    • Malig Hyperthermia Neg Hx        Social History     Socioeconomic History   • Marital status:      Spouse name: Pratik   • Number of children: 3   Tobacco Use   • Smoking status: Former     Packs/day: 0.50     Years: 43.00     Pack years: 21.50     Types: Cigarettes     Quit date: 2017     Years since quittin.7   • Smokeless tobacco: Never   Vaping Use   • Vaping Use: Never used   Substance and Sexual Activity   • Alcohol use: No   • Drug use: No   • Sexual activity: Defer     Partners: Female           Objective   Physical Exam  Vitals and nursing note reviewed.   HENT:      Head: Normocephalic.      Right Ear: External ear normal.      Left Ear: External ear normal.      Nose: Nose normal.      Mouth/Throat:      Mouth: Mucous membranes are moist.      Pharynx: Oropharynx is clear.   Eyes:       Conjunctiva/sclera: Conjunctivae normal.   Cardiovascular:      Rate and Rhythm: Normal rate and regular rhythm.      Heart sounds: Normal heart sounds.   Pulmonary:      Effort: Pulmonary effort is normal.      Breath sounds: Normal breath sounds.   Abdominal:      General: Abdomen is flat. Bowel sounds are normal. There is no distension.      Palpations: Abdomen is soft.      Tenderness: There is no abdominal tenderness. There is no right CVA tenderness, left CVA tenderness or guarding.   Musculoskeletal:         General: No swelling or tenderness.      Cervical back: Normal range of motion and neck supple.   Skin:     General: Skin is warm and dry.      Capillary Refill: Capillary refill takes 2 to 3 seconds.   Neurological:      Mental Status: She is alert and oriented to person, place, and time.   Psychiatric:         Mood and Affect: Mood normal.         Behavior: Behavior normal.         Procedures           ED Course                                           Medical Decision Making  ddx kidney stone, UTI, pyelonephritis, enteritis, colitis    CT Abdomen Pelvis Without Contrast    Result Date: 5/14/2023  7 mm proximal left ureteral stone with moderate left hydronephrosis  Nonobstructing bilateral renal stones  Otherwise normal  This report was finalized on 5/14/2023 9:21 PM by Dr. Antelmo Mcmullen MD.      Labs Reviewed  COMPREHENSIVE METABOLIC PANEL - Abnormal; Notable for the following components:     Glucose                       137 (*)                Creatinine                    1.37 (*)               Chloride                      108 (*)                CO2                           16.4 (*)               Total Protein                 9.0 (*)                ALT (SGPT)                    55 (*)                 AST (SGOT)                    53 (*)                 Alkaline Phosphatase          129 (*)                eGFR                          43.2 (*)            All other components within normal limits          Narrative: GFR Normal >60                  Chronic Kidney Disease <60                  Kidney Failure <15                    URINALYSIS W/ MICROSCOPIC IF INDICATED (NO CULTURE) - Abnormal; Notable for the following components:     Blood, UA                     Small (1+) (*)               Protein, UA                     (*)                  Leuk Esterase, UA               (*)               All other components within normal limits  CBC WITH AUTO DIFFERENTIAL - Abnormal; Notable for the following components:     WBC                           13.03 (*)               MCV                           97.3 (*)               Neutrophil %                  85.2 (*)               Lymphocyte %                  6.5 (*)                Neutrophils, Absolute         11.10 (*)               Monocytes, Absolute           0.99 (*)            All other components within normal limits  URINALYSIS, MICROSCOPIC ONLY - Abnormal; Notable for the following components:     RBC, UA                       3-5 (*)                WBC, UA                       13-20 (*)               Bacteria, UA                  Trace (*)            All other components within normal limits  LIPASE - Normal  LACTIC ACID, PLASMA - Normal  RAINBOW DRAW         Narrative: The following orders were created for panel order [place] Draw.                  Procedure                               Abnormality         Status                                     ---------                               -----------         ------                                     Green Top (Gel)([id number])                                  Final result                               Lavender Top([id number])                                     Final result                               Gold Top - SST([id number])                                   Final result                               Light Blue Top([id number])                                   Final result                                                  Please view results for these tests on the individual orders.  CBC AND DIFFERENTIAL         Narrative: The following orders were created for panel order CBC & Differential.                  Procedure                               Abnormality         Status                                     ---------                               -----------         ------                                     CBC Auto Differential([id number])        Abnormal            Final result                                                 Please view results for these tests on the individual orders.  GREEN TOP  LAVENDER TOP  GOLD TOP - SST  LIGHT BLUE TOP    Pt seen again prior to d/c.  Labs/Imaging reviewed and are remarkable for kidney stone and uti; patient also has a metabolic acidosis that she is aware of and her urologist is trying to keep her that way to prevent more stones from forming.  [facility] and can call tomorrow for follow-up appointment.  Symptoms improved and pt feels much better; vitals stable and pt. in NAD. Non-toxic. Comfortable. Ambulating without difficulty.  Tolerating po.  Relaxed breathing.  All questions personally answered at the bedside and all d/c instructions personally reviewed with pt.  Discussed the importance of close outpt. f/u and pt. understands this and agrees to do so.  Pt agrees to return to ED immediately for any new, persistent, or worsening symptoms.    EMR Dragon/Transcription disclaimer:  Much of this encounter note is an electronic transcription/translation of spoken language to printed text, aka voice recognition.  The electronic translation of spoken language may permit erroneous or at times nonsensical words or phrases to be inadvertently transcribed; although I have reviewed the note for such errors, some may still exist so please interpret based on surrounding text content.    Acute cystitis with hematuria: acute illness or injury  Flank pain:  acute illness or injury  Ureterolithiasis: acute illness or injury  Amount and/or Complexity of Data Reviewed  Labs: ordered.  Radiology: ordered.      Risk  Prescription drug management.          Final diagnoses:   Ureterolithiasis   Flank pain   Acute cystitis with hematuria       ED Disposition  ED Disposition     ED Disposition   Discharge    Condition   Stable    Comment   --             [facility]  [address] [facility] [address]  [phone]  In 3 days           Medication List      New Prescriptions    cephalexin 500 MG capsule  Commonly known as: KEFLEX  Take 2 capsules by mouth Every 12 (Twelve) Hours for 10 days.     tamsulosin 0.4 MG capsule 24 hr capsule  Commonly known as: FLOMAX  Take 1 capsule by mouth Daily.           Where to Get Your Medications      These medications were sent to [id number] - [place], [place] - [address] AT [employer] [address] - [phone]  - [phone]   [address], [place] [address]    Phone: [phone]   · cephalexin 500 MG capsule  · tamsulosin 0.4 MG capsule 24 hr capsule          Rico Howard MD  05/14/23 1908

## 2023-05-23 ENCOUNTER — TRANSCRIBE ORDERS (OUTPATIENT)
Dept: ADMINISTRATIVE | Facility: HOSPITAL | Age: 63
End: 2023-05-23
Payer: COMMERCIAL

## 2023-05-23 DIAGNOSIS — N20.0 RENAL CALCULI: Primary | ICD-10-CM

## 2023-07-25 ENCOUNTER — HOSPITAL ENCOUNTER (OUTPATIENT)
Dept: GENERAL RADIOLOGY | Facility: HOSPITAL | Age: 63
Discharge: HOME | End: 2023-07-25
Payer: COMMERCIAL

## 2023-07-25 ENCOUNTER — HOSPITAL ENCOUNTER (OUTPATIENT)
Dept: ULTRASOUND IMAGING | Facility: HOSPITAL | Age: 63
Discharge: HOME | End: 2023-07-25
Payer: COMMERCIAL

## 2023-07-25 DIAGNOSIS — N20.0 RENAL CALCULI: ICD-10-CM

## 2023-07-25 PROCEDURE — 76775 US EXAM ABDO BACK WALL LIM: CPT

## 2023-07-25 PROCEDURE — 74018 RADEX ABDOMEN 1 VIEW: CPT

## 2024-01-05 ENCOUNTER — LAB (OUTPATIENT)
Dept: LAB | Facility: HOSPITAL | Age: 64
End: 2024-01-05
Payer: MEDICARE

## 2024-01-05 DIAGNOSIS — N18.31 CHRONIC KIDNEY DISEASE (CKD) STAGE G3A/A1, MODERATELY DECREASED GLOMERULAR FILTRATION RATE (GFR) BETWEEN 45-59 ML/MIN/1.73 SQUARE METER AND ALBUMINURIA CREATININE RATIO LESS THAN 30 MG/G (CMS/H*: ICD-10-CM

## 2024-01-05 LAB
ANION GAP SERPL CALCULATED.3IONS-SCNC: 11.2 MMOL/L (ref 5–15)
BACTERIA UR QL AUTO: ABNORMAL /HPF
BASOPHILS # BLD AUTO: 0.05 10*3/MM3 (ref 0–0.2)
BASOPHILS NFR BLD AUTO: 0.8 % (ref 0–1.5)
BILIRUB UR QL STRIP: NEGATIVE
BUN SERPL-MCNC: 21 MG/DL (ref 8–23)
BUN/CREAT SERPL: 13.9 (ref 7–25)
CALCIUM SPEC-SCNC: 9.9 MG/DL (ref 8.6–10.5)
CHLORIDE SERPL-SCNC: 106 MMOL/L (ref 98–107)
CLARITY UR: ABNORMAL
CO2 SERPL-SCNC: 19.8 MMOL/L (ref 22–29)
COLOR UR: YELLOW
CREAT SERPL-MCNC: 1.51 MG/DL (ref 0.57–1)
DEPRECATED RDW RBC AUTO: 46.5 FL (ref 37–54)
EGFRCR SERPLBLD CKD-EPI 2021: 38.2 ML/MIN/1.73
EOSINOPHIL # BLD AUTO: 0.23 10*3/MM3 (ref 0–0.4)
EOSINOPHIL NFR BLD AUTO: 3.6 % (ref 0.3–6.2)
ERYTHROCYTE [DISTWIDTH] IN BLOOD BY AUTOMATED COUNT: 13.1 % (ref 12.3–15.4)
GLUCOSE SERPL-MCNC: 82 MG/DL (ref 65–99)
GLUCOSE UR STRIP-MCNC: NEGATIVE MG/DL
HCT VFR BLD AUTO: 40.8 % (ref 34–46.6)
HGB BLD-MCNC: 13.3 G/DL (ref 12–15.9)
HGB UR QL STRIP.AUTO: ABNORMAL
HOLD SPECIMEN: NORMAL
HYALINE CASTS UR QL AUTO: ABNORMAL /LPF
IMM GRANULOCYTES # BLD AUTO: 0.01 10*3/MM3 (ref 0–0.05)
IMM GRANULOCYTES NFR BLD AUTO: 0.2 % (ref 0–0.5)
KETONES UR QL STRIP: NEGATIVE
LEUKOCYTE ESTERASE UR QL STRIP.AUTO: ABNORMAL
LYMPHOCYTES # BLD AUTO: 1.82 10*3/MM3 (ref 0.7–3.1)
LYMPHOCYTES NFR BLD AUTO: 28.7 % (ref 19.6–45.3)
MCH RBC QN AUTO: 31.1 PG (ref 26.6–33)
MCHC RBC AUTO-ENTMCNC: 32.6 G/DL (ref 31.5–35.7)
MCV RBC AUTO: 95.6 FL (ref 79–97)
MONOCYTES # BLD AUTO: 0.92 10*3/MM3 (ref 0.1–0.9)
MONOCYTES NFR BLD AUTO: 14.5 % (ref 5–12)
NEUTROPHILS NFR BLD AUTO: 3.32 10*3/MM3 (ref 1.7–7)
NEUTROPHILS NFR BLD AUTO: 52.2 % (ref 42.7–76)
NITRITE UR QL STRIP: NEGATIVE
NRBC BLD AUTO-RTO: 0 /100 WBC (ref 0–0.2)
PH UR STRIP.AUTO: 7.5 [PH] (ref 5–8)
PLATELET # BLD AUTO: 253 10*3/MM3 (ref 140–450)
PMV BLD AUTO: 11.2 FL (ref 6–12)
POTASSIUM SERPL-SCNC: 4.5 MMOL/L (ref 3.5–5.2)
PROT UR QL STRIP: ABNORMAL
RBC # BLD AUTO: 4.27 10*6/MM3 (ref 3.77–5.28)
RBC # UR STRIP: ABNORMAL /HPF
REF LAB TEST METHOD: ABNORMAL
SODIUM SERPL-SCNC: 137 MMOL/L (ref 136–145)
SP GR UR STRIP: 1.02 (ref 1–1.03)
SQUAMOUS #/AREA URNS HPF: ABNORMAL /HPF
UROBILINOGEN UR QL STRIP: ABNORMAL
WBC # UR STRIP: ABNORMAL /HPF
WBC NRBC COR # BLD AUTO: 6.35 10*3/MM3 (ref 3.4–10.8)

## 2024-01-05 PROCEDURE — 36415 COLL VENOUS BLD VENIPUNCTURE: CPT

## 2024-01-05 PROCEDURE — 80048 BASIC METABOLIC PNL TOTAL CA: CPT

## 2024-01-05 PROCEDURE — 85025 COMPLETE CBC W/AUTO DIFF WBC: CPT

## 2024-01-05 PROCEDURE — 81001 URINALYSIS AUTO W/SCOPE: CPT | Performed by: INTERNAL MEDICINE

## 2024-01-05 PROCEDURE — 87088 URINE BACTERIA CULTURE: CPT | Performed by: INTERNAL MEDICINE

## 2024-01-05 PROCEDURE — 87086 URINE CULTURE/COLONY COUNT: CPT | Performed by: INTERNAL MEDICINE

## 2024-01-07 LAB — BACTERIA SPEC AEROBE CULT: ABNORMAL

## 2024-01-25 ENCOUNTER — LAB (OUTPATIENT)
Dept: LAB | Facility: HOSPITAL | Age: 64
End: 2024-01-25
Payer: MEDICARE

## 2024-01-25 ENCOUNTER — TRANSCRIBE ORDERS (OUTPATIENT)
Dept: ADMINISTRATIVE | Facility: HOSPITAL | Age: 64
End: 2024-01-25
Payer: MEDICARE

## 2024-01-25 DIAGNOSIS — N17.8 ACUTE RENAL FAILURE WITH PATHOLOGICAL LESION IN KIDNEY: ICD-10-CM

## 2024-01-25 DIAGNOSIS — N17.8 ACUTE RENAL FAILURE WITH PATHOLOGICAL LESION IN KIDNEY: Primary | ICD-10-CM

## 2024-01-25 LAB
ANION GAP SERPL CALCULATED.3IONS-SCNC: 8.9 MMOL/L (ref 5–15)
BUN SERPL-MCNC: 21 MG/DL (ref 8–23)
BUN/CREAT SERPL: 14.8 (ref 7–25)
CALCIUM SPEC-SCNC: 9.7 MG/DL (ref 8.6–10.5)
CHLORIDE SERPL-SCNC: 109 MMOL/L (ref 98–107)
CO2 SERPL-SCNC: 20.1 MMOL/L (ref 22–29)
CREAT SERPL-MCNC: 1.42 MG/DL (ref 0.57–1)
EGFRCR SERPLBLD CKD-EPI 2021: 41.1 ML/MIN/1.73
GLUCOSE SERPL-MCNC: 83 MG/DL (ref 65–99)
POTASSIUM SERPL-SCNC: 4.1 MMOL/L (ref 3.5–5.2)
SODIUM SERPL-SCNC: 138 MMOL/L (ref 136–145)

## 2024-01-25 PROCEDURE — 36415 COLL VENOUS BLD VENIPUNCTURE: CPT

## 2024-01-25 PROCEDURE — 80048 BASIC METABOLIC PNL TOTAL CA: CPT

## 2025-03-10 ENCOUNTER — TRANSCRIBE ORDERS (OUTPATIENT)
Dept: ADMINISTRATIVE | Facility: HOSPITAL | Age: 65
End: 2025-03-10
Payer: MEDICARE

## 2025-03-10 ENCOUNTER — LAB (OUTPATIENT)
Dept: LAB | Facility: HOSPITAL | Age: 65
End: 2025-03-10
Payer: MEDICARE

## 2025-03-10 DIAGNOSIS — N18.31 CHRONIC KIDNEY DISEASE (CKD) STAGE G3A/A1, MODERATELY DECREASED GLOMERULAR FILTRATION RATE (GFR) BETWEEN 45-59 ML/MIN/1.73 SQUARE METER AND ALBUMINURIA CREATININE RATIO LESS THAN 30 MG/G (CMS/H*: Primary | ICD-10-CM

## 2025-03-10 DIAGNOSIS — N18.31 CHRONIC KIDNEY DISEASE (CKD) STAGE G3A/A1, MODERATELY DECREASED GLOMERULAR FILTRATION RATE (GFR) BETWEEN 45-59 ML/MIN/1.73 SQUARE METER AND ALBUMINURIA CREATININE RATIO LESS THAN 30 MG/G (CMS/H*: ICD-10-CM

## 2025-03-10 LAB
ANION GAP SERPL CALCULATED.3IONS-SCNC: 9.4 MMOL/L (ref 5–15)
BASOPHILS # BLD AUTO: 0.06 10*3/MM3 (ref 0–0.2)
BASOPHILS NFR BLD AUTO: 1.3 % (ref 0–1.5)
BUN SERPL-MCNC: 26 MG/DL (ref 8–23)
BUN/CREAT SERPL: 20.3 (ref 7–25)
CALCIUM SPEC-SCNC: 10 MG/DL (ref 8.6–10.5)
CHLORIDE SERPL-SCNC: 105 MMOL/L (ref 98–107)
CO2 SERPL-SCNC: 21.6 MMOL/L (ref 22–29)
CREAT SERPL-MCNC: 1.28 MG/DL (ref 0.57–1)
DEPRECATED RDW RBC AUTO: 44.9 FL (ref 37–54)
EGFRCR SERPLBLD CKD-EPI 2021: 46.3 ML/MIN/1.73
EOSINOPHIL # BLD AUTO: 0.26 10*3/MM3 (ref 0–0.4)
EOSINOPHIL NFR BLD AUTO: 5.7 % (ref 0.3–6.2)
ERYTHROCYTE [DISTWIDTH] IN BLOOD BY AUTOMATED COUNT: 13 % (ref 12.3–15.4)
GLUCOSE SERPL-MCNC: 94 MG/DL (ref 65–99)
HCT VFR BLD AUTO: 41 % (ref 34–46.6)
HGB BLD-MCNC: 13.2 G/DL (ref 12–15.9)
IMM GRANULOCYTES # BLD AUTO: 0.01 10*3/MM3 (ref 0–0.05)
IMM GRANULOCYTES NFR BLD AUTO: 0.2 % (ref 0–0.5)
LYMPHOCYTES # BLD AUTO: 1.64 10*3/MM3 (ref 0.7–3.1)
LYMPHOCYTES NFR BLD AUTO: 35.9 % (ref 19.6–45.3)
MCH RBC QN AUTO: 30.4 PG (ref 26.6–33)
MCHC RBC AUTO-ENTMCNC: 32.2 G/DL (ref 31.5–35.7)
MCV RBC AUTO: 94.5 FL (ref 79–97)
MONOCYTES # BLD AUTO: 0.68 10*3/MM3 (ref 0.1–0.9)
MONOCYTES NFR BLD AUTO: 14.9 % (ref 5–12)
NEUTROPHILS NFR BLD AUTO: 1.92 10*3/MM3 (ref 1.7–7)
NEUTROPHILS NFR BLD AUTO: 42 % (ref 42.7–76)
NRBC BLD AUTO-RTO: 0 /100 WBC (ref 0–0.2)
PLATELET # BLD AUTO: 215 10*3/MM3 (ref 140–450)
PMV BLD AUTO: 11.1 FL (ref 6–12)
POTASSIUM SERPL-SCNC: 4.6 MMOL/L (ref 3.5–5.2)
RBC # BLD AUTO: 4.34 10*6/MM3 (ref 3.77–5.28)
SODIUM SERPL-SCNC: 136 MMOL/L (ref 136–145)
WBC NRBC COR # BLD AUTO: 4.57 10*3/MM3 (ref 3.4–10.8)

## 2025-03-10 PROCEDURE — 80048 BASIC METABOLIC PNL TOTAL CA: CPT

## 2025-03-10 PROCEDURE — 87086 URINE CULTURE/COLONY COUNT: CPT

## 2025-03-10 PROCEDURE — 81001 URINALYSIS AUTO W/SCOPE: CPT

## 2025-03-10 PROCEDURE — 36415 COLL VENOUS BLD VENIPUNCTURE: CPT

## 2025-03-10 PROCEDURE — 85025 COMPLETE CBC W/AUTO DIFF WBC: CPT

## 2025-03-11 LAB
HOLD SPECIMEN: NORMAL
SPECIMEN STATUS: NORMAL

## 2025-03-15 LAB
APPEARANCE UR: CLEAR
BACTERIA #/AREA URNS HPF: NORMAL /[HPF]
BACTERIA UR CULT: ABNORMAL
BACTERIA UR CULT: ABNORMAL
BILIRUB UR QL STRIP: NEGATIVE
CASTS URNS QL MICRO: NORMAL /LPF
COLOR UR: YELLOW
EPI CELLS #/AREA URNS HPF: NORMAL /HPF (ref 0–10)
GLUCOSE UR QL STRIP: NEGATIVE
HGB UR QL STRIP: NEGATIVE
KETONES UR QL STRIP: NEGATIVE
LEUKOCYTE ESTERASE UR QL STRIP: ABNORMAL
MICRO URNS: ABNORMAL
NITRITE UR QL STRIP: NEGATIVE
OTHER ANTIBIOTIC SUSC ISLT: ABNORMAL
PH UR STRIP: 7.5 [PH] (ref 5–7.5)
PROT UR QL STRIP: NEGATIVE
RBC #/AREA URNS HPF: NORMAL /HPF (ref 0–2)
SP GR UR STRIP: 1.01 (ref 1–1.03)
URINALYSIS REFLEX: ABNORMAL
UROBILINOGEN UR STRIP-MCNC: 0.2 MG/DL (ref 0.2–1)
WBC #/AREA URNS HPF: NORMAL /HPF (ref 0–5)

## (undated) DEVICE — SCRW HEX PERSONA FML 2.5X25MM PK/2
Type: IMPLANTABLE DEVICE | Site: KNEE | Status: NON-FUNCTIONAL
Removed: 2021-12-07

## (undated) DEVICE — INTENDED USE FOR SURGICAL MARKING ON INTACT SKIN, ALSO PROVIDES A PERMANENT METHOD OF IDENTIFYING OBJECTS IN THE OPERATING ROOM: Brand: WRITESITE® REGULAR TIP SKIN MARKER

## (undated) DEVICE — FRAZIER SURGICAL SUCTION INSTRUMENT: Brand: ARGYLE

## (undated) DEVICE — GOWN,NON-REINFORCED,SIRUS,SET IN SLV,XL: Brand: MEDLINE

## (undated) DEVICE — TRAP FLD MINIVAC MEGADYNE 100ML

## (undated) DEVICE — PK URETSCP 40

## (undated) DEVICE — SPNG GZ WOVN 4X4IN 12PLY 10/BX STRL

## (undated) DEVICE — PUMP PAIN AUTOFUSER AUTO SELCT NOBOLUS 1TO14ML/HR 550ML DISP

## (undated) DEVICE — TIDISHIELD UROLOGY DRAIN BAGS FROSTY VINYL FITS SIEMENS UROSKOP ACCESS 20 PER CASE: Brand: TIDISHIELD

## (undated) DEVICE — PAT KN PERSONA VE CRS/LNK CMT 8.5X32MM: Type: IMPLANTABLE DEVICE | Site: KNEE | Status: NON-FUNCTIONAL

## (undated) DEVICE — FOAM BUMP ROUND LARGE: Brand: MEDLINE INDUSTRIES, INC.

## (undated) DEVICE — GLV SURG SENSICARE PI PF LF 7 GRN STRL

## (undated) DEVICE — PENCL E/S ULTRAVAC TELESCP NOSE HOLSTR 10FT

## (undated) DEVICE — GLV SURG SENSICARE PI LF PF 7.0

## (undated) DEVICE — GLV SURG TRIUMPH CLASSIC PF LTX 8 STRL

## (undated) DEVICE — MEDI-VAC YANK SUCT HNDL W/TPRD BULBOUS TIP: Brand: CARDINAL HEALTH

## (undated) DEVICE — MAT FLR ABSORBENT LG 4FT 10 2.5FT

## (undated) DEVICE — DISPOSABLE TOURNIQUET CUFF SINGLE BLADDER, SINGLE PORT AND QUICK CONNECT CONNECTOR: Brand: COLOR CUFF

## (undated) DEVICE — GLV SURG NEOLON 2G PF LF 7.5 STRL

## (undated) DEVICE — PK KN TOTL 90

## (undated) DEVICE — WRAP KNEE COLD THERAPY

## (undated) DEVICE — SYS CLS SKIN PREMIERPRO EXOFINFUSION 22CM

## (undated) DEVICE — NITINOL STONE RETRIEVAL BASKET: Brand: ZERO TIP

## (undated) DEVICE — SCRW HEX CORT HD 3.5X38MM
Type: IMPLANTABLE DEVICE | Site: KNEE | Status: NON-FUNCTIONAL
Removed: 2021-12-07

## (undated) DEVICE — SOL ISO/ALC RUB 70PCT 4OZ

## (undated) DEVICE — SUT VIC 0 CT1 36IN J946H

## (undated) DEVICE — DUAL CUT SAGITTAL BLADE

## (undated) DEVICE — INTENDED FOR TISSUE SEPARATION, AND OTHER PROCEDURES THAT REQUIRE A SHARP SURGICAL BLADE TO PUNCTURE OR CUT.: Brand: BARD-PARKER ® STAINLESS STEEL BLADES

## (undated) DEVICE — ADAPT Y ROT GATEWAY PLS

## (undated) DEVICE — CEMENT MIXING SYSTEM WITH FEMORAL BREAKWAY NOZZLE: Brand: REVOLUTION

## (undated) DEVICE — WEBRIL* CAST PADDING: Brand: DEROYAL

## (undated) DEVICE — Device

## (undated) DEVICE — HOOD, PEEL-AWAY: Brand: FLYTE

## (undated) DEVICE — PATIENT RETURN ELECTRODE, SINGLE-USE, CONTACT QUALITY MONITORING, ADULT, WITH 9FT CORD, FOR PATIENTS WEIGING OVER 33LBS. (15KG): Brand: MEGADYNE

## (undated) DEVICE — SUT VIC 2/0 CT1 CR8 18IN J839D

## (undated) DEVICE — DRP Z/FRICTION 10X16IN

## (undated) DEVICE — DRSNG TELFA PAD NONADH STR 1S 3X8IN

## (undated) DEVICE — PREP SOL POVIDONE/IODINE BT 4OZ

## (undated) DEVICE — 3M™ STERI-DRAPE™ U-DRAPE 1015: Brand: STERI-DRAPE™